# Patient Record
Sex: MALE | Race: WHITE | Employment: OTHER | URBAN - METROPOLITAN AREA
[De-identification: names, ages, dates, MRNs, and addresses within clinical notes are randomized per-mention and may not be internally consistent; named-entity substitution may affect disease eponyms.]

---

## 2021-01-01 ENCOUNTER — APPOINTMENT (OUTPATIENT)
Dept: PHYSICAL THERAPY | Facility: CLINIC | Age: 81
DRG: 470 | End: 2021-01-01
Attending: ORTHOPAEDIC SURGERY
Payer: MEDICARE

## 2021-01-01 ENCOUNTER — MYC MEDICAL ADVICE (OUTPATIENT)
Dept: FAMILY MEDICINE | Facility: CLINIC | Age: 81
End: 2021-01-01

## 2021-01-01 ENCOUNTER — APPOINTMENT (OUTPATIENT)
Dept: INTERPRETER SERVICES | Facility: CLINIC | Age: 81
DRG: 470 | End: 2021-01-01
Attending: ORTHOPAEDIC SURGERY
Payer: MEDICARE

## 2021-01-01 ENCOUNTER — TELEPHONE (OUTPATIENT)
Dept: FAMILY MEDICINE | Facility: CLINIC | Age: 81
End: 2021-01-01

## 2021-01-01 ENCOUNTER — APPOINTMENT (OUTPATIENT)
Dept: CT IMAGING | Facility: CLINIC | Age: 81
DRG: 177 | End: 2021-01-01
Attending: EMERGENCY MEDICINE
Payer: MEDICARE

## 2021-01-01 ENCOUNTER — LAB REQUISITION (OUTPATIENT)
Dept: LAB | Facility: CLINIC | Age: 81
End: 2021-01-01

## 2021-01-01 ENCOUNTER — OFFICE VISIT (OUTPATIENT)
Dept: ORTHOPEDICS | Facility: CLINIC | Age: 81
End: 2021-01-01
Attending: INTERNAL MEDICINE
Payer: MEDICARE

## 2021-01-01 ENCOUNTER — ANESTHESIA EVENT (OUTPATIENT)
Dept: SURGERY | Facility: CLINIC | Age: 81
DRG: 470 | End: 2021-01-01
Payer: MEDICARE

## 2021-01-01 ENCOUNTER — APPOINTMENT (OUTPATIENT)
Dept: PHYSICAL THERAPY | Facility: CLINIC | Age: 81
DRG: 871 | End: 2021-01-01
Attending: STUDENT IN AN ORGANIZED HEALTH CARE EDUCATION/TRAINING PROGRAM
Payer: MEDICARE

## 2021-01-01 ENCOUNTER — APPOINTMENT (OUTPATIENT)
Dept: ULTRASOUND IMAGING | Facility: CLINIC | Age: 81
DRG: 871 | End: 2021-01-01
Attending: STUDENT IN AN ORGANIZED HEALTH CARE EDUCATION/TRAINING PROGRAM
Payer: MEDICARE

## 2021-01-01 ENCOUNTER — OFFICE VISIT (OUTPATIENT)
Dept: RHEUMATOLOGY | Facility: CLINIC | Age: 81
End: 2021-01-01
Payer: MEDICARE

## 2021-01-01 ENCOUNTER — TELEPHONE (OUTPATIENT)
Dept: NEPHROLOGY | Facility: CLINIC | Age: 81
End: 2021-01-01

## 2021-01-01 ENCOUNTER — NURSE TRIAGE (OUTPATIENT)
Dept: FAMILY MEDICINE | Facility: CLINIC | Age: 81
End: 2021-01-01

## 2021-01-01 ENCOUNTER — APPOINTMENT (OUTPATIENT)
Dept: INTERPRETER SERVICES | Facility: CLINIC | Age: 81
End: 2021-01-01
Payer: MEDICARE

## 2021-01-01 ENCOUNTER — HOSPITAL ENCOUNTER (INPATIENT)
Facility: CLINIC | Age: 81
LOS: 9 days | DRG: 177 | End: 2021-12-17
Attending: EMERGENCY MEDICINE | Admitting: INTERNAL MEDICINE
Payer: MEDICARE

## 2021-01-01 ENCOUNTER — DOCUMENTATION ONLY (OUTPATIENT)
Dept: ORTHOPEDICS | Facility: CLINIC | Age: 81
End: 2021-01-01

## 2021-01-01 ENCOUNTER — TELEPHONE (OUTPATIENT)
Dept: ORTHOPEDICS | Facility: CLINIC | Age: 81
End: 2021-01-01

## 2021-01-01 ENCOUNTER — ANESTHESIA EVENT (OUTPATIENT)
Dept: MEDSURG UNIT | Facility: CLINIC | Age: 81
DRG: 177 | End: 2021-01-01
Payer: MEDICARE

## 2021-01-01 ENCOUNTER — OFFICE VISIT (OUTPATIENT)
Dept: FAMILY MEDICINE | Facility: CLINIC | Age: 81
End: 2021-01-01
Payer: MEDICARE

## 2021-01-01 ENCOUNTER — MEDICAL CORRESPONDENCE (OUTPATIENT)
Dept: HEALTH INFORMATION MANAGEMENT | Facility: CLINIC | Age: 81
End: 2021-01-01

## 2021-01-01 ENCOUNTER — ANESTHESIA (OUTPATIENT)
Dept: MEDSURG UNIT | Facility: CLINIC | Age: 81
DRG: 177 | End: 2021-01-01
Payer: MEDICARE

## 2021-01-01 ENCOUNTER — APPOINTMENT (OUTPATIENT)
Dept: OCCUPATIONAL THERAPY | Facility: CLINIC | Age: 81
DRG: 470 | End: 2021-01-01
Attending: PHYSICIAN ASSISTANT
Payer: MEDICARE

## 2021-01-01 ENCOUNTER — OFFICE VISIT (OUTPATIENT)
Dept: ORTHOPEDICS | Facility: CLINIC | Age: 81
End: 2021-01-01
Payer: MEDICARE

## 2021-01-01 ENCOUNTER — ANCILLARY PROCEDURE (OUTPATIENT)
Dept: GENERAL RADIOLOGY | Facility: CLINIC | Age: 81
End: 2021-01-01
Attending: FAMILY MEDICINE
Payer: MEDICARE

## 2021-01-01 ENCOUNTER — APPOINTMENT (OUTPATIENT)
Dept: CT IMAGING | Facility: CLINIC | Age: 81
DRG: 177 | End: 2021-01-01
Attending: INTERNAL MEDICINE
Payer: MEDICARE

## 2021-01-01 ENCOUNTER — APPOINTMENT (OUTPATIENT)
Dept: PHYSICAL THERAPY | Facility: CLINIC | Age: 81
DRG: 871 | End: 2021-01-01
Payer: MEDICARE

## 2021-01-01 ENCOUNTER — ANESTHESIA (OUTPATIENT)
Dept: SURGERY | Facility: CLINIC | Age: 81
DRG: 470 | End: 2021-01-01
Payer: MEDICARE

## 2021-01-01 ENCOUNTER — APPOINTMENT (OUTPATIENT)
Dept: GENERAL RADIOLOGY | Facility: CLINIC | Age: 81
DRG: 871 | End: 2021-01-01
Attending: EMERGENCY MEDICINE
Payer: MEDICARE

## 2021-01-01 ENCOUNTER — HOSPITAL ENCOUNTER (INPATIENT)
Facility: CLINIC | Age: 81
LOS: 3 days | Discharge: HOME | DRG: 470 | End: 2021-11-04
Attending: ORTHOPAEDIC SURGERY | Admitting: ORTHOPAEDIC SURGERY
Payer: MEDICARE

## 2021-01-01 ENCOUNTER — OFFICE VISIT (OUTPATIENT)
Dept: SURGERY | Facility: CLINIC | Age: 81
End: 2021-01-01
Payer: MEDICARE

## 2021-01-01 ENCOUNTER — LAB (OUTPATIENT)
Dept: LAB | Facility: CLINIC | Age: 81
End: 2021-01-01
Payer: MEDICARE

## 2021-01-01 ENCOUNTER — APPOINTMENT (OUTPATIENT)
Dept: GENERAL RADIOLOGY | Facility: CLINIC | Age: 81
DRG: 177 | End: 2021-01-01
Attending: EMERGENCY MEDICINE
Payer: MEDICARE

## 2021-01-01 ENCOUNTER — APPOINTMENT (OUTPATIENT)
Dept: GENERAL RADIOLOGY | Facility: CLINIC | Age: 81
DRG: 177 | End: 2021-01-01
Attending: INTERNAL MEDICINE
Payer: MEDICARE

## 2021-01-01 ENCOUNTER — OFFICE VISIT (OUTPATIENT)
Dept: ENDOCRINOLOGY | Facility: CLINIC | Age: 81
End: 2021-01-01
Attending: INTERNAL MEDICINE
Payer: MEDICARE

## 2021-01-01 ENCOUNTER — PATIENT OUTREACH (OUTPATIENT)
Dept: FAMILY MEDICINE | Facility: CLINIC | Age: 81
End: 2021-01-01
Payer: MEDICARE

## 2021-01-01 ENCOUNTER — TELEPHONE (OUTPATIENT)
Dept: FAMILY MEDICINE | Facility: CLINIC | Age: 81
End: 2021-01-01
Payer: MEDICARE

## 2021-01-01 ENCOUNTER — TELEPHONE (OUTPATIENT)
Dept: GERIATRICS | Facility: CLINIC | Age: 81
End: 2021-01-01
Payer: MEDICARE

## 2021-01-01 ENCOUNTER — ANCILLARY PROCEDURE (OUTPATIENT)
Dept: GENERAL RADIOLOGY | Facility: CLINIC | Age: 81
End: 2021-01-01
Attending: INTERNAL MEDICINE
Payer: MEDICARE

## 2021-01-01 ENCOUNTER — APPOINTMENT (OUTPATIENT)
Dept: GENERAL RADIOLOGY | Facility: CLINIC | Age: 81
DRG: 177 | End: 2021-01-01
Attending: NURSE PRACTITIONER
Payer: MEDICARE

## 2021-01-01 ENCOUNTER — PATIENT OUTREACH (OUTPATIENT)
Dept: CARE COORDINATION | Facility: CLINIC | Age: 81
End: 2021-01-01

## 2021-01-01 ENCOUNTER — APPOINTMENT (OUTPATIENT)
Dept: OCCUPATIONAL THERAPY | Facility: CLINIC | Age: 81
DRG: 871 | End: 2021-01-01
Attending: INTERNAL MEDICINE
Payer: MEDICARE

## 2021-01-01 ENCOUNTER — APPOINTMENT (OUTPATIENT)
Dept: OCCUPATIONAL THERAPY | Facility: CLINIC | Age: 81
DRG: 470 | End: 2021-01-01
Attending: ORTHOPAEDIC SURGERY
Payer: MEDICARE

## 2021-01-01 ENCOUNTER — HOSPITAL ENCOUNTER (INPATIENT)
Facility: CLINIC | Age: 81
LOS: 4 days | Discharge: HOME HEALTH | DRG: 871 | End: 2021-11-11
Attending: EMERGENCY MEDICINE | Admitting: STUDENT IN AN ORGANIZED HEALTH CARE EDUCATION/TRAINING PROGRAM
Payer: MEDICARE

## 2021-01-01 ENCOUNTER — OFFICE VISIT (OUTPATIENT)
Dept: ORTHOPEDICS | Facility: CLINIC | Age: 81
End: 2021-01-01
Attending: FAMILY MEDICINE
Payer: MEDICARE

## 2021-01-01 ENCOUNTER — PRE VISIT (OUTPATIENT)
Dept: SURGERY | Facility: CLINIC | Age: 81
End: 2021-01-01

## 2021-01-01 ENCOUNTER — TRANSITIONAL CARE UNIT VISIT (OUTPATIENT)
Dept: GERIATRICS | Facility: CLINIC | Age: 81
End: 2021-01-01
Payer: MEDICARE

## 2021-01-01 ENCOUNTER — LAB (OUTPATIENT)
Dept: URGENT CARE | Facility: URGENT CARE | Age: 81
End: 2021-01-01
Attending: ORTHOPAEDIC SURGERY
Payer: MEDICARE

## 2021-01-01 ENCOUNTER — OFFICE VISIT (OUTPATIENT)
Dept: RHEUMATOLOGY | Facility: CLINIC | Age: 81
End: 2021-01-01
Attending: INTERNAL MEDICINE
Payer: MEDICARE

## 2021-01-01 ENCOUNTER — TELEPHONE (OUTPATIENT)
Dept: NEPHROLOGY | Facility: CLINIC | Age: 81
End: 2021-01-01
Payer: MEDICARE

## 2021-01-01 ENCOUNTER — VIRTUAL VISIT (OUTPATIENT)
Dept: EDUCATION SERVICES | Facility: CLINIC | Age: 81
End: 2021-01-01
Attending: INTERNAL MEDICINE
Payer: MEDICARE

## 2021-01-01 ENCOUNTER — HOSPITAL ENCOUNTER (OUTPATIENT)
Dept: ULTRASOUND IMAGING | Facility: CLINIC | Age: 81
Discharge: HOME | End: 2021-08-17
Attending: INTERNAL MEDICINE | Admitting: INTERNAL MEDICINE
Payer: MEDICARE

## 2021-01-01 ENCOUNTER — APPOINTMENT (OUTPATIENT)
Dept: LAB | Facility: CLINIC | Age: 81
End: 2021-01-01
Payer: MEDICARE

## 2021-01-01 ENCOUNTER — APPOINTMENT (OUTPATIENT)
Dept: GENERAL RADIOLOGY | Facility: CLINIC | Age: 81
DRG: 470 | End: 2021-01-01
Attending: PHYSICIAN ASSISTANT
Payer: MEDICARE

## 2021-01-01 ENCOUNTER — HEALTH MAINTENANCE LETTER (OUTPATIENT)
Age: 81
End: 2021-01-01

## 2021-01-01 ENCOUNTER — PRE VISIT (OUTPATIENT)
Dept: NEPHROLOGY | Facility: CLINIC | Age: 81
End: 2021-01-01

## 2021-01-01 VITALS
SYSTOLIC BLOOD PRESSURE: 123 MMHG | WEIGHT: 170.5 LBS | BODY MASS INDEX: 25.84 KG/M2 | TEMPERATURE: 97 F | HEIGHT: 68 IN | HEART RATE: 92 BPM | OXYGEN SATURATION: 94 % | DIASTOLIC BLOOD PRESSURE: 59 MMHG

## 2021-01-01 VITALS — HEIGHT: 68 IN | WEIGHT: 160 LBS | BODY MASS INDEX: 24.25 KG/M2

## 2021-01-01 VITALS
HEART RATE: 96 BPM | BODY MASS INDEX: 24.74 KG/M2 | DIASTOLIC BLOOD PRESSURE: 67 MMHG | SYSTOLIC BLOOD PRESSURE: 136 MMHG | OXYGEN SATURATION: 95 % | WEIGHT: 163.2 LBS | HEIGHT: 68 IN

## 2021-01-01 VITALS
HEIGHT: 68 IN | TEMPERATURE: 97.2 F | HEART RATE: 98 BPM | DIASTOLIC BLOOD PRESSURE: 70 MMHG | RESPIRATION RATE: 17 BRPM | OXYGEN SATURATION: 90 % | WEIGHT: 163 LBS | SYSTOLIC BLOOD PRESSURE: 130 MMHG | BODY MASS INDEX: 24.71 KG/M2

## 2021-01-01 VITALS
SYSTOLIC BLOOD PRESSURE: 126 MMHG | HEART RATE: 79 BPM | OXYGEN SATURATION: 95 % | TEMPERATURE: 97.4 F | HEIGHT: 68 IN | RESPIRATION RATE: 16 BRPM | DIASTOLIC BLOOD PRESSURE: 72 MMHG | BODY MASS INDEX: 25.49 KG/M2 | WEIGHT: 168.2 LBS

## 2021-01-01 VITALS
SYSTOLIC BLOOD PRESSURE: 135 MMHG | OXYGEN SATURATION: 94 % | HEIGHT: 68 IN | HEART RATE: 84 BPM | BODY MASS INDEX: 25.46 KG/M2 | WEIGHT: 168 LBS | TEMPERATURE: 99 F | DIASTOLIC BLOOD PRESSURE: 71 MMHG

## 2021-01-01 VITALS
HEART RATE: 123 BPM | OXYGEN SATURATION: 91 % | DIASTOLIC BLOOD PRESSURE: 47 MMHG | RESPIRATION RATE: 28 BRPM | WEIGHT: 128.97 LBS | TEMPERATURE: 98.4 F | BODY MASS INDEX: 17.99 KG/M2 | SYSTOLIC BLOOD PRESSURE: 64 MMHG

## 2021-01-01 VITALS
TEMPERATURE: 97.6 F | DIASTOLIC BLOOD PRESSURE: 75 MMHG | SYSTOLIC BLOOD PRESSURE: 136 MMHG | BODY MASS INDEX: 24.63 KG/M2 | HEART RATE: 89 BPM | WEIGHT: 162 LBS | OXYGEN SATURATION: 93 %

## 2021-01-01 VITALS
BODY MASS INDEX: 24.02 KG/M2 | OXYGEN SATURATION: 94 % | WEIGHT: 175 LBS | DIASTOLIC BLOOD PRESSURE: 76 MMHG | HEART RATE: 80 BPM | TEMPERATURE: 96.6 F | WEIGHT: 158 LBS | SYSTOLIC BLOOD PRESSURE: 133 MMHG | RESPIRATION RATE: 20 BRPM | BODY MASS INDEX: 26.61 KG/M2 | HEART RATE: 86 BPM | TEMPERATURE: 97.6 F | SYSTOLIC BLOOD PRESSURE: 142 MMHG | RESPIRATION RATE: 20 BRPM | OXYGEN SATURATION: 95 % | DIASTOLIC BLOOD PRESSURE: 77 MMHG

## 2021-01-01 VITALS
SYSTOLIC BLOOD PRESSURE: 130 MMHG | TEMPERATURE: 97.9 F | HEART RATE: 89 BPM | RESPIRATION RATE: 20 BRPM | WEIGHT: 163 LBS | OXYGEN SATURATION: 94 % | HEIGHT: 68 IN | BODY MASS INDEX: 24.71 KG/M2 | DIASTOLIC BLOOD PRESSURE: 69 MMHG

## 2021-01-01 VITALS
HEART RATE: 65 BPM | TEMPERATURE: 98.4 F | OXYGEN SATURATION: 98 % | SYSTOLIC BLOOD PRESSURE: 130 MMHG | WEIGHT: 155.87 LBS | BODY MASS INDEX: 21.82 KG/M2 | RESPIRATION RATE: 15 BRPM | DIASTOLIC BLOOD PRESSURE: 59 MMHG | HEIGHT: 71 IN

## 2021-01-01 VITALS
TEMPERATURE: 99.2 F | SYSTOLIC BLOOD PRESSURE: 127 MMHG | HEART RATE: 100 BPM | HEIGHT: 68 IN | RESPIRATION RATE: 16 BRPM | OXYGEN SATURATION: 93 % | BODY MASS INDEX: 24.73 KG/M2 | DIASTOLIC BLOOD PRESSURE: 78 MMHG | WEIGHT: 163.14 LBS

## 2021-01-01 VITALS
HEART RATE: 80 BPM | TEMPERATURE: 97.1 F | OXYGEN SATURATION: 96 % | HEIGHT: 68 IN | BODY MASS INDEX: 25.91 KG/M2 | SYSTOLIC BLOOD PRESSURE: 133 MMHG | WEIGHT: 171 LBS | DIASTOLIC BLOOD PRESSURE: 70 MMHG

## 2021-01-01 VITALS
BODY MASS INDEX: 26.61 KG/M2 | DIASTOLIC BLOOD PRESSURE: 72 MMHG | HEIGHT: 68 IN | HEART RATE: 104 BPM | OXYGEN SATURATION: 95 % | SYSTOLIC BLOOD PRESSURE: 119 MMHG

## 2021-01-01 VITALS — BODY MASS INDEX: 26.55 KG/M2 | SYSTOLIC BLOOD PRESSURE: 100 MMHG | DIASTOLIC BLOOD PRESSURE: 56 MMHG | WEIGHT: 174.6 LBS

## 2021-01-01 DIAGNOSIS — M19.90 INFLAMMATORY ARTHRITIS: ICD-10-CM

## 2021-01-01 DIAGNOSIS — Z11.59 NEED FOR HEPATITIS C SCREENING TEST: ICD-10-CM

## 2021-01-01 DIAGNOSIS — R73.01 ELEVATED FASTING BLOOD SUGAR: ICD-10-CM

## 2021-01-01 DIAGNOSIS — E11.69 TYPE 2 DIABETES MELLITUS WITH OTHER SPECIFIED COMPLICATION, WITHOUT LONG-TERM CURRENT USE OF INSULIN (H): Primary | ICD-10-CM

## 2021-01-01 DIAGNOSIS — R79.89 ELEVATED SERUM CREATININE: ICD-10-CM

## 2021-01-01 DIAGNOSIS — R79.89 ELEVATED SERUM CREATININE: Primary | ICD-10-CM

## 2021-01-01 DIAGNOSIS — M10.9 GOUT, UNSPECIFIED CAUSE, UNSPECIFIED CHRONICITY, UNSPECIFIED SITE: ICD-10-CM

## 2021-01-01 DIAGNOSIS — M25.562 BILATERAL KNEE PAIN: ICD-10-CM

## 2021-01-01 DIAGNOSIS — M05.79 RHEUMATOID ARTHRITIS INVOLVING MULTIPLE SITES WITH POSITIVE RHEUMATOID FACTOR (H): ICD-10-CM

## 2021-01-01 DIAGNOSIS — R73.01 ELEVATED FASTING BLOOD SUGAR: Primary | ICD-10-CM

## 2021-01-01 DIAGNOSIS — Z51.81 ENCOUNTER FOR THERAPEUTIC DRUG MONITORING: ICD-10-CM

## 2021-01-01 DIAGNOSIS — M25.561 BILATERAL KNEE PAIN: ICD-10-CM

## 2021-01-01 DIAGNOSIS — K59.01 SLOW TRANSIT CONSTIPATION: ICD-10-CM

## 2021-01-01 DIAGNOSIS — D72.829 LEUKOCYTOSIS, UNSPECIFIED TYPE: ICD-10-CM

## 2021-01-01 DIAGNOSIS — J18.9 COMMUNITY ACQUIRED PNEUMONIA, UNSPECIFIED LATERALITY: ICD-10-CM

## 2021-01-01 DIAGNOSIS — Z71.89 OTHER SPECIFIED COUNSELING: ICD-10-CM

## 2021-01-01 DIAGNOSIS — R03.0 ELEVATED BP WITHOUT DIAGNOSIS OF HYPERTENSION: ICD-10-CM

## 2021-01-01 DIAGNOSIS — M05.742 RHEUMATOID ARTHRITIS INVOLVING BOTH HANDS WITH POSITIVE RHEUMATOID FACTOR (H): Primary | ICD-10-CM

## 2021-01-01 DIAGNOSIS — M17.12 LOCALIZED OSTEOARTHRITIS OF LEFT KNEE: Primary | ICD-10-CM

## 2021-01-01 DIAGNOSIS — M1A.0690 CHRONIC GOUT OF KNEE, UNSPECIFIED CAUSE, UNSPECIFIED LATERALITY: ICD-10-CM

## 2021-01-01 DIAGNOSIS — M1A.0690 CHRONIC GOUT OF KNEE, UNSPECIFIED CAUSE, UNSPECIFIED LATERALITY: Primary | ICD-10-CM

## 2021-01-01 DIAGNOSIS — G89.29 CHRONIC PAIN OF BOTH KNEES: ICD-10-CM

## 2021-01-01 DIAGNOSIS — M25.561 CHRONIC PAIN OF BOTH KNEES: ICD-10-CM

## 2021-01-01 DIAGNOSIS — M05.742 RHEUMATOID ARTHRITIS INVOLVING BOTH HANDS WITH POSITIVE RHEUMATOID FACTOR (H): ICD-10-CM

## 2021-01-01 DIAGNOSIS — M17.0 BILATERAL PRIMARY OSTEOARTHRITIS OF KNEE: ICD-10-CM

## 2021-01-01 DIAGNOSIS — R53.81 PHYSICAL DECONDITIONING: ICD-10-CM

## 2021-01-01 DIAGNOSIS — Z00.00 ANNUAL PHYSICAL EXAM: Primary | ICD-10-CM

## 2021-01-01 DIAGNOSIS — E55.9 VITAMIN D DEFICIENCY, UNSPECIFIED: ICD-10-CM

## 2021-01-01 DIAGNOSIS — Z11.59 ENCOUNTER FOR SCREENING FOR OTHER VIRAL DISEASES: ICD-10-CM

## 2021-01-01 DIAGNOSIS — M17.10 PRIMARY OSTEOARTHRITIS OF ONE KNEE: ICD-10-CM

## 2021-01-01 DIAGNOSIS — E11.65 UNCONTROLLED TYPE 2 DIABETES MELLITUS WITH HYPERGLYCEMIA (H): Primary | ICD-10-CM

## 2021-01-01 DIAGNOSIS — M05.741 RHEUMATOID ARTHRITIS INVOLVING BOTH HANDS WITH POSITIVE RHEUMATOID FACTOR (H): Primary | ICD-10-CM

## 2021-01-01 DIAGNOSIS — D62 ACUTE BLOOD LOSS ANEMIA: ICD-10-CM

## 2021-01-01 DIAGNOSIS — N18.2 TYPE 2 DIABETES MELLITUS WITH STAGE 2 CHRONIC KIDNEY DISEASE, WITHOUT LONG-TERM CURRENT USE OF INSULIN (H): ICD-10-CM

## 2021-01-01 DIAGNOSIS — Z79.631 LONG TERM METHOTREXATE USER: Primary | ICD-10-CM

## 2021-01-01 DIAGNOSIS — Z23 INFLUENZA VACCINE ADMINISTERED: ICD-10-CM

## 2021-01-01 DIAGNOSIS — M06.9 RHEUMATOID ARTHRITIS, INVOLVING UNSPECIFIED SITE, UNSPECIFIED WHETHER RHEUMATOID FACTOR PRESENT (H): ICD-10-CM

## 2021-01-01 DIAGNOSIS — K59.00 CONSTIPATION, UNSPECIFIED CONSTIPATION TYPE: Primary | ICD-10-CM

## 2021-01-01 DIAGNOSIS — Z01.818 PREOPERATIVE EXAMINATION: Primary | ICD-10-CM

## 2021-01-01 DIAGNOSIS — Z01.818 PREOP EXAMINATION: Primary | ICD-10-CM

## 2021-01-01 DIAGNOSIS — Z00.01 ENCOUNTER FOR GENERAL ADULT MEDICAL EXAMINATION WITH ABNORMAL FINDINGS: ICD-10-CM

## 2021-01-01 DIAGNOSIS — E78.5 HYPERLIPIDEMIA LDL GOAL <130: ICD-10-CM

## 2021-01-01 DIAGNOSIS — R09.02 HYPOXIA: ICD-10-CM

## 2021-01-01 DIAGNOSIS — M10.042 ACUTE IDIOPATHIC GOUT OF LEFT HAND: Primary | ICD-10-CM

## 2021-01-01 DIAGNOSIS — Z96.652 STATUS POST TOTAL LEFT KNEE REPLACEMENT: Primary | ICD-10-CM

## 2021-01-01 DIAGNOSIS — Z79.631 LONG TERM METHOTREXATE USER: ICD-10-CM

## 2021-01-01 DIAGNOSIS — U07.1 INFECTION DUE TO 2019 NOVEL CORONAVIRUS: ICD-10-CM

## 2021-01-01 DIAGNOSIS — Z96.652 HISTORY OF TOTAL KNEE ARTHROPLASTY, LEFT: Primary | ICD-10-CM

## 2021-01-01 DIAGNOSIS — J96.01 ACUTE RESPIRATORY FAILURE WITH HYPOXIA (H): ICD-10-CM

## 2021-01-01 DIAGNOSIS — E11.69 TYPE 2 DIABETES MELLITUS WITH OTHER SPECIFIED COMPLICATION, WITHOUT LONG-TERM CURRENT USE OF INSULIN (H): ICD-10-CM

## 2021-01-01 DIAGNOSIS — M17.12 OSTEOARTHRITIS OF LEFT KNEE, UNSPECIFIED OSTEOARTHRITIS TYPE: ICD-10-CM

## 2021-01-01 DIAGNOSIS — Z01.818 PREOP EXAMINATION: ICD-10-CM

## 2021-01-01 DIAGNOSIS — M19.90 INFLAMMATORY ARTHRITIS: Primary | ICD-10-CM

## 2021-01-01 DIAGNOSIS — E11.65 UNCONTROLLED TYPE 2 DIABETES MELLITUS WITH HYPERGLYCEMIA (H): ICD-10-CM

## 2021-01-01 DIAGNOSIS — M05.741 RHEUMATOID ARTHRITIS INVOLVING BOTH HANDS WITH POSITIVE RHEUMATOID FACTOR (H): ICD-10-CM

## 2021-01-01 DIAGNOSIS — M25.562 CHRONIC PAIN OF BOTH KNEES: ICD-10-CM

## 2021-01-01 DIAGNOSIS — M10.9 ACUTE GOUTY ARTHRITIS: ICD-10-CM

## 2021-01-01 DIAGNOSIS — M17.0 BILATERAL PRIMARY OSTEOARTHRITIS OF KNEE: Primary | ICD-10-CM

## 2021-01-01 DIAGNOSIS — I87.2 CHRONIC VENOUS INSUFFICIENCY: ICD-10-CM

## 2021-01-01 DIAGNOSIS — E78.5 DYSLIPIDEMIA: ICD-10-CM

## 2021-01-01 DIAGNOSIS — E11.22 TYPE 2 DIABETES MELLITUS WITH STAGE 2 CHRONIC KIDNEY DISEASE, WITHOUT LONG-TERM CURRENT USE OF INSULIN (H): ICD-10-CM

## 2021-01-01 LAB
ABO/RH(D): NORMAL
ABO/RH(D): NORMAL
ALBUMIN SERPL-MCNC: 2.3 G/DL (ref 3.4–5)
ALBUMIN SERPL-MCNC: 2.4 G/DL (ref 3.4–5)
ALBUMIN SERPL-MCNC: 2.4 G/DL (ref 3.4–5)
ALBUMIN SERPL-MCNC: 3 G/DL (ref 3.4–5)
ALBUMIN SERPL-MCNC: 3.1 G/DL (ref 3.4–5)
ALBUMIN SERPL-MCNC: 3.2 G/DL (ref 3.4–5)
ALBUMIN SERPL-MCNC: 3.5 G/DL (ref 3.4–5)
ALBUMIN SERPL-MCNC: 3.7 G/DL (ref 3.4–5)
ALBUMIN SERPL-MCNC: 3.8 G/DL (ref 3.4–5)
ALBUMIN SERPL-MCNC: 3.8 G/DL (ref 3.4–5)
ALBUMIN UR-MCNC: 20 MG/DL
ALBUMIN UR-MCNC: NEGATIVE MG/DL
ALBUMIN UR-MCNC: NEGATIVE MG/DL
ALP SERPL-CCNC: 102 U/L (ref 40–150)
ALP SERPL-CCNC: 137 U/L (ref 40–150)
ALP SERPL-CCNC: 51 U/L (ref 40–150)
ALP SERPL-CCNC: 54 U/L (ref 40–150)
ALT SERPL W P-5'-P-CCNC: 15 U/L (ref 0–70)
ALT SERPL W P-5'-P-CCNC: 18 U/L (ref 0–70)
ALT SERPL W P-5'-P-CCNC: 20 U/L (ref 0–70)
ALT SERPL W P-5'-P-CCNC: 22 U/L (ref 0–70)
ALT SERPL W P-5'-P-CCNC: 26 U/L (ref 0–70)
ALT SERPL W P-5'-P-CCNC: 33 U/L (ref 0–70)
ALT SERPL W P-5'-P-CCNC: 38 U/L (ref 0–70)
AMMONIA PLAS-SCNC: <10 UMOL/L (ref 10–50)
AMORPH CRY #/AREA URNS HPF: ABNORMAL /HPF
ANION GAP SERPL CALCULATED.3IONS-SCNC: 3 MMOL/L (ref 3–14)
ANION GAP SERPL CALCULATED.3IONS-SCNC: 3 MMOL/L (ref 3–14)
ANION GAP SERPL CALCULATED.3IONS-SCNC: 5 MMOL/L (ref 3–14)
ANION GAP SERPL CALCULATED.3IONS-SCNC: 6 MMOL/L (ref 3–14)
ANION GAP SERPL CALCULATED.3IONS-SCNC: 7 MMOL/L (ref 3–14)
ANION GAP SERPL CALCULATED.3IONS-SCNC: 8 MMOL/L (ref 3–14)
ANION GAP SERPL CALCULATED.3IONS-SCNC: 9 MMOL/L (ref 3–14)
ANTIBODY SCREEN: NEGATIVE
ANTIBODY SCREEN: NEGATIVE
APPEARANCE UR: CLEAR
AST SERPL W P-5'-P-CCNC: 13 U/L (ref 0–45)
AST SERPL W P-5'-P-CCNC: 15 U/L (ref 0–45)
AST SERPL W P-5'-P-CCNC: 21 U/L (ref 0–45)
AST SERPL W P-5'-P-CCNC: 30 U/L (ref 0–45)
AST SERPL W P-5'-P-CCNC: 36 U/L (ref 0–45)
AST SERPL W P-5'-P-CCNC: 9 U/L (ref 0–45)
AST SERPL W P-5'-P-CCNC: 9 U/L (ref 0–45)
ATRIAL RATE - MUSE: 121 BPM
ATRIAL RATE - MUSE: 90 BPM
BACTERIA BLD CULT: NO GROWTH
BACTERIA BLD CULT: NO GROWTH
BASE EXCESS BLDA CALC-SCNC: 0.6 MMOL/L (ref -9–1.8)
BASE EXCESS BLDA CALC-SCNC: 1.2 MMOL/L (ref -9–1.8)
BASE EXCESS BLDV CALC-SCNC: -0.9 MMOL/L (ref -7.7–1.9)
BASE EXCESS BLDV CALC-SCNC: -5.9 MMOL/L (ref -7.7–1.9)
BASE EXCESS BLDV CALC-SCNC: 4.3 MMOL/L (ref -7.7–1.9)
BASOPHILS # BLD AUTO: 0 10E3/UL (ref 0–0.2)
BASOPHILS # BLD MANUAL: 0 10E3/UL (ref 0–0.2)
BASOPHILS NFR BLD AUTO: 0 %
BASOPHILS NFR BLD MANUAL: 0 %
BILIRUB SERPL-MCNC: 0.3 MG/DL (ref 0.2–1.3)
BILIRUB SERPL-MCNC: 0.4 MG/DL (ref 0.2–1.3)
BILIRUB SERPL-MCNC: 0.5 MG/DL (ref 0.2–1.3)
BILIRUB SERPL-MCNC: 0.8 MG/DL (ref 0.2–1.3)
BILIRUB UR QL STRIP: NEGATIVE
BUN SERPL-MCNC: 14 MG/DL (ref 7–30)
BUN SERPL-MCNC: 17 MG/DL (ref 7–30)
BUN SERPL-MCNC: 18 MG/DL (ref 7–30)
BUN SERPL-MCNC: 18 MG/DL (ref 7–30)
BUN SERPL-MCNC: 19 MG/DL (ref 7–30)
BUN SERPL-MCNC: 19 MG/DL (ref 7–30)
BUN SERPL-MCNC: 24 MG/DL (ref 7–30)
BUN SERPL-MCNC: 31 MG/DL (ref 7–30)
BUN SERPL-MCNC: 32 MG/DL (ref 7–30)
BUN SERPL-MCNC: 33 MG/DL (ref 7–30)
BUN SERPL-MCNC: 35 MG/DL (ref 7–30)
BUN SERPL-MCNC: 36 MG/DL (ref 7–30)
BUN SERPL-MCNC: 37 MG/DL (ref 7–30)
BUN SERPL-MCNC: 38 MG/DL (ref 7–30)
BUN SERPL-MCNC: 40 MG/DL (ref 7–30)
BUN SERPL-MCNC: 40 MG/DL (ref 7–30)
BUN SERPL-MCNC: 41 MG/DL (ref 7–30)
BUN SERPL-MCNC: 42 MG/DL (ref 7–30)
BUN SERPL-MCNC: 42 MG/DL (ref 7–30)
BUN SERPL-MCNC: 51 MG/DL (ref 7–30)
CALCIUM SERPL-MCNC: 10 MG/DL (ref 8.5–10.1)
CALCIUM SERPL-MCNC: 10 MG/DL (ref 8.5–10.1)
CALCIUM SERPL-MCNC: 10.1 MG/DL (ref 8.5–10.1)
CALCIUM SERPL-MCNC: 10.2 MG/DL (ref 8.5–10.1)
CALCIUM SERPL-MCNC: 10.2 MG/DL (ref 8.5–10.1)
CALCIUM SERPL-MCNC: 10.3 MG/DL (ref 8.5–10.1)
CALCIUM SERPL-MCNC: 10.4 MG/DL (ref 8.5–10.1)
CALCIUM SERPL-MCNC: 10.6 MG/DL (ref 8.5–10.1)
CALCIUM SERPL-MCNC: 8.4 MG/DL (ref 8.5–10.1)
CALCIUM SERPL-MCNC: 8.6 MG/DL (ref 8.5–10.1)
CALCIUM SERPL-MCNC: 8.9 MG/DL (ref 8.5–10.1)
CALCIUM SERPL-MCNC: 8.9 MG/DL (ref 8.5–10.1)
CALCIUM SERPL-MCNC: 9.3 MG/DL (ref 8.5–10.1)
CALCIUM SERPL-MCNC: 9.4 MG/DL (ref 8.5–10.1)
CALCIUM SERPL-MCNC: 9.5 MG/DL (ref 8.5–10.1)
CALCIUM SERPL-MCNC: 9.6 MG/DL (ref 8.5–10.1)
CALCIUM SERPL-MCNC: 9.6 MG/DL (ref 8.5–10.1)
CALCIUM SERPL-MCNC: 9.7 MG/DL (ref 8.5–10.1)
CALCIUM SERPL-MCNC: 9.8 MG/DL (ref 8.5–10.1)
CALCIUM SERPL-MCNC: 9.8 MG/DL (ref 8.5–10.1)
CCP AB SER IA-ACNC: 1.3 U/ML
CHLORIDE BLD-SCNC: 100 MMOL/L (ref 94–109)
CHLORIDE BLD-SCNC: 101 MMOL/L (ref 94–109)
CHLORIDE BLD-SCNC: 102 MMOL/L (ref 94–109)
CHLORIDE BLD-SCNC: 102 MMOL/L (ref 94–109)
CHLORIDE BLD-SCNC: 103 MMOL/L (ref 94–109)
CHLORIDE BLD-SCNC: 104 MMOL/L (ref 94–109)
CHLORIDE BLD-SCNC: 105 MMOL/L (ref 94–109)
CHLORIDE BLD-SCNC: 105 MMOL/L (ref 94–109)
CHLORIDE BLD-SCNC: 106 MMOL/L (ref 94–109)
CHLORIDE BLD-SCNC: 107 MMOL/L (ref 94–109)
CHLORIDE BLD-SCNC: 110 MMOL/L (ref 94–109)
CHLORIDE BLD-SCNC: 111 MMOL/L (ref 94–109)
CHLORIDE BLD-SCNC: 112 MMOL/L (ref 94–109)
CHLORIDE BLD-SCNC: 112 MMOL/L (ref 94–109)
CHLORIDE BLD-SCNC: 117 MMOL/L (ref 94–109)
CHLORIDE BLD-SCNC: 119 MMOL/L (ref 94–109)
CHLORIDE BLD-SCNC: 95 MMOL/L (ref 94–109)
CHLORIDE BLD-SCNC: 99 MMOL/L (ref 94–109)
CHOLEST SERPL-MCNC: 172 MG/DL
CK SERPL-CCNC: 106 U/L (ref 30–300)
CK SERPL-CCNC: 17 U/L (ref 30–300)
CK SERPL-CCNC: 37 U/L (ref 30–300)
CO2 SERPL-SCNC: 17 MMOL/L (ref 20–32)
CO2 SERPL-SCNC: 22 MMOL/L (ref 20–32)
CO2 SERPL-SCNC: 23 MMOL/L (ref 20–32)
CO2 SERPL-SCNC: 24 MMOL/L (ref 20–32)
CO2 SERPL-SCNC: 25 MMOL/L (ref 20–32)
CO2 SERPL-SCNC: 26 MMOL/L (ref 20–32)
CO2 SERPL-SCNC: 27 MMOL/L (ref 20–32)
CO2 SERPL-SCNC: 28 MMOL/L (ref 20–32)
CO2 SERPL-SCNC: 30 MMOL/L (ref 20–32)
COLOR UR AUTO: ABNORMAL
COLOR UR AUTO: ABNORMAL
COLOR UR AUTO: YELLOW
CREAT SERPL-MCNC: 0.67 MG/DL (ref 0.66–1.25)
CREAT SERPL-MCNC: 0.72 MG/DL (ref 0.66–1.25)
CREAT SERPL-MCNC: 0.79 MG/DL (ref 0.66–1.25)
CREAT SERPL-MCNC: 0.79 MG/DL (ref 0.66–1.25)
CREAT SERPL-MCNC: 0.81 MG/DL (ref 0.66–1.25)
CREAT SERPL-MCNC: 0.82 MG/DL (ref 0.66–1.25)
CREAT SERPL-MCNC: 0.83 MG/DL (ref 0.66–1.25)
CREAT SERPL-MCNC: 0.84 MG/DL (ref 0.66–1.25)
CREAT SERPL-MCNC: 0.85 MG/DL (ref 0.66–1.25)
CREAT SERPL-MCNC: 0.85 MG/DL (ref 0.66–1.25)
CREAT SERPL-MCNC: 0.87 MG/DL (ref 0.66–1.25)
CREAT SERPL-MCNC: 0.89 MG/DL (ref 0.66–1.25)
CREAT SERPL-MCNC: 0.9 MG/DL (ref 0.66–1.25)
CREAT SERPL-MCNC: 0.9 MG/DL (ref 0.66–1.25)
CREAT SERPL-MCNC: 0.94 MG/DL (ref 0.66–1.25)
CREAT SERPL-MCNC: 0.99 MG/DL (ref 0.66–1.25)
CREAT SERPL-MCNC: 1.03 MG/DL (ref 0.66–1.25)
CREAT SERPL-MCNC: 1.08 MG/DL (ref 0.66–1.25)
CREAT SERPL-MCNC: 1.17 MG/DL (ref 0.66–1.25)
CREAT SERPL-MCNC: 1.17 MG/DL (ref 0.66–1.25)
CREAT SERPL-MCNC: 1.23 MG/DL (ref 0.66–1.25)
CREAT SERPL-MCNC: 1.55 MG/DL (ref 0.66–1.25)
CREAT SERPL-MCNC: 2.29 MG/DL (ref 0.66–1.25)
CREAT UR-MCNC: 127 MG/DL
CREAT UR-MCNC: 44 MG/DL
CRP SERPL-MCNC: 100 MG/L (ref 0–8)
CRP SERPL-MCNC: 101 MG/L (ref 0–8)
CRP SERPL-MCNC: 11 MG/L (ref 0–8)
CRP SERPL-MCNC: 115 MG/L (ref 0–8)
CRP SERPL-MCNC: 150 MG/L (ref 0–8)
CRP SERPL-MCNC: 156 MG/L (ref 0–8)
CRP SERPL-MCNC: 177 MG/L (ref 0–8)
CRP SERPL-MCNC: 218 MG/L (ref 0–8)
CRP SERPL-MCNC: 257 MG/L (ref 0–8)
CRP SERPL-MCNC: 277 MG/L (ref 0–8)
CRP SERPL-MCNC: 344 MG/L (ref 0–8)
CRP SERPL-MCNC: 372 MG/L (ref 0–8)
CRP SERPL-MCNC: 83.7 MG/L (ref 0–8)
D DIMER PPP FEU-MCNC: 0.72 UG/ML FEU (ref 0–0.5)
D DIMER PPP FEU-MCNC: 0.78 UG/ML FEU (ref 0–0.5)
D DIMER PPP FEU-MCNC: 0.91 UG/ML FEU (ref 0–0.5)
D DIMER PPP FEU-MCNC: 1.41 UG/ML FEU (ref 0–0.5)
D DIMER PPP FEU-MCNC: 1.45 UG/ML FEU (ref 0–0.5)
D DIMER PPP FEU-MCNC: 1.76 UG/ML FEU (ref 0–0.5)
DEPRECATED CALCIDIOL+CALCIFEROL SERPL-MC: 39 UG/L (ref 20–75)
DIASTOLIC BLOOD PRESSURE - MUSE: NORMAL MMHG
DIASTOLIC BLOOD PRESSURE - MUSE: NORMAL MMHG
EOSINOPHIL # BLD AUTO: 0 10E3/UL (ref 0–0.7)
EOSINOPHIL # BLD AUTO: 0.1 10E3/UL (ref 0–0.7)
EOSINOPHIL # BLD MANUAL: 0 10E3/UL (ref 0–0.7)
EOSINOPHIL NFR BLD AUTO: 0 %
EOSINOPHIL NFR BLD MANUAL: 0 %
EOSINOPHIL NFR BLD MANUAL: 1 %
ERYTHROCYTE [DISTWIDTH] IN BLOOD BY AUTOMATED COUNT: 13.1 % (ref 10–15)
ERYTHROCYTE [DISTWIDTH] IN BLOOD BY AUTOMATED COUNT: 13.4 % (ref 10–15)
ERYTHROCYTE [DISTWIDTH] IN BLOOD BY AUTOMATED COUNT: 13.5 % (ref 10–15)
ERYTHROCYTE [DISTWIDTH] IN BLOOD BY AUTOMATED COUNT: 13.6 % (ref 10–15)
ERYTHROCYTE [DISTWIDTH] IN BLOOD BY AUTOMATED COUNT: 14 % (ref 10–15)
ERYTHROCYTE [DISTWIDTH] IN BLOOD BY AUTOMATED COUNT: 14.3 % (ref 10–15)
ERYTHROCYTE [DISTWIDTH] IN BLOOD BY AUTOMATED COUNT: 14.3 % (ref 10–15)
ERYTHROCYTE [DISTWIDTH] IN BLOOD BY AUTOMATED COUNT: 14.5 % (ref 10–15)
ERYTHROCYTE [DISTWIDTH] IN BLOOD BY AUTOMATED COUNT: 14.6 % (ref 10–15)
ERYTHROCYTE [DISTWIDTH] IN BLOOD BY AUTOMATED COUNT: 14.8 % (ref 10–15)
ERYTHROCYTE [DISTWIDTH] IN BLOOD BY AUTOMATED COUNT: 14.8 % (ref 10–15)
ERYTHROCYTE [DISTWIDTH] IN BLOOD BY AUTOMATED COUNT: 14.9 % (ref 10–15)
ERYTHROCYTE [DISTWIDTH] IN BLOOD BY AUTOMATED COUNT: 15 % (ref 10–15)
ERYTHROCYTE [SEDIMENTATION RATE] IN BLOOD BY WESTERGREN METHOD: 112 MM/HR (ref 0–20)
ERYTHROCYTE [SEDIMENTATION RATE] IN BLOOD BY WESTERGREN METHOD: 42 MM/HR (ref 0–20)
ERYTHROCYTE [SEDIMENTATION RATE] IN BLOOD BY WESTERGREN METHOD: 73 MM/HR (ref 0–20)
ERYTHROCYTE [SEDIMENTATION RATE] IN BLOOD BY WESTERGREN METHOD: 82 MM/HR (ref 0–20)
ERYTHROCYTE [SEDIMENTATION RATE] IN BLOOD BY WESTERGREN METHOD: 87 MM/HR (ref 0–20)
FASTING STATUS PATIENT QL REPORTED: YES
FERRITIN SERPL-MCNC: 130 NG/ML (ref 26–388)
FERRITIN SERPL-MCNC: 1389 NG/ML (ref 26–388)
FERRITIN SERPL-MCNC: 709 NG/ML (ref 26–388)
FERRITIN SERPL-MCNC: 803 NG/ML (ref 26–388)
FIBRINOGEN PPP-MCNC: 658 MG/DL (ref 170–490)
FIBRINOGEN PPP-MCNC: 752 MG/DL (ref 170–490)
FIBRINOGEN PPP-MCNC: 816 MG/DL (ref 170–490)
FIBRINOGEN PPP-MCNC: 915 MG/DL (ref 170–490)
FLUAV RNA SPEC QL NAA+PROBE: NEGATIVE
FLUBV RNA RESP QL NAA+PROBE: NEGATIVE
GAMMA INTERFERON BACKGROUND BLD IA-ACNC: 0.06 IU/ML
GFR SERPL CREATININE-BSD FRML MDRD: 26 ML/MIN/1.73M2
GFR SERPL CREATININE-BSD FRML MDRD: 42 ML/MIN/1.73M2
GFR SERPL CREATININE-BSD FRML MDRD: 55 ML/MIN/1.73M2
GFR SERPL CREATININE-BSD FRML MDRD: 59 ML/MIN/1.73M2
GFR SERPL CREATININE-BSD FRML MDRD: 59 ML/MIN/1.73M2
GFR SERPL CREATININE-BSD FRML MDRD: 65 ML/MIN/1.73M2
GFR SERPL CREATININE-BSD FRML MDRD: 69 ML/MIN/1.73M2
GFR SERPL CREATININE-BSD FRML MDRD: 72 ML/MIN/1.73M2
GFR SERPL CREATININE-BSD FRML MDRD: 77 ML/MIN/1.73M2
GFR SERPL CREATININE-BSD FRML MDRD: 81 ML/MIN/1.73M2
GFR SERPL CREATININE-BSD FRML MDRD: 81 ML/MIN/1.73M2
GFR SERPL CREATININE-BSD FRML MDRD: 82 ML/MIN/1.73M2
GFR SERPL CREATININE-BSD FRML MDRD: 82 ML/MIN/1.73M2
GFR SERPL CREATININE-BSD FRML MDRD: 83 ML/MIN/1.73M2
GFR SERPL CREATININE-BSD FRML MDRD: 84 ML/MIN/1.73M2
GFR SERPL CREATININE-BSD FRML MDRD: 85 ML/MIN/1.73M2
GFR SERPL CREATININE-BSD FRML MDRD: 89 ML/MIN/1.73M2
GFR SERPL CREATININE-BSD FRML MDRD: >90 ML/MIN/1.73M2
GLUCOSE BLD-MCNC: 110 MG/DL (ref 70–99)
GLUCOSE BLD-MCNC: 120 MG/DL (ref 70–99)
GLUCOSE BLD-MCNC: 131 MG/DL (ref 70–99)
GLUCOSE BLD-MCNC: 137 MG/DL (ref 70–99)
GLUCOSE BLD-MCNC: 146 MG/DL (ref 70–99)
GLUCOSE BLD-MCNC: 152 MG/DL (ref 70–99)
GLUCOSE BLD-MCNC: 162 MG/DL (ref 70–99)
GLUCOSE BLD-MCNC: 164 MG/DL (ref 70–99)
GLUCOSE BLD-MCNC: 165 MG/DL (ref 70–99)
GLUCOSE BLD-MCNC: 167 MG/DL (ref 70–99)
GLUCOSE BLD-MCNC: 184 MG/DL (ref 70–99)
GLUCOSE BLD-MCNC: 190 MG/DL (ref 70–99)
GLUCOSE BLD-MCNC: 194 MG/DL (ref 70–99)
GLUCOSE BLD-MCNC: 215 MG/DL (ref 70–99)
GLUCOSE BLD-MCNC: 224 MG/DL (ref 70–99)
GLUCOSE BLD-MCNC: 224 MG/DL (ref 70–99)
GLUCOSE BLD-MCNC: 226 MG/DL (ref 70–99)
GLUCOSE BLD-MCNC: 281 MG/DL (ref 70–99)
GLUCOSE BLD-MCNC: 57 MG/DL (ref 70–99)
GLUCOSE BLD-MCNC: 86 MG/DL (ref 70–99)
GLUCOSE BLD-MCNC: 92 MG/DL (ref 70–99)
GLUCOSE BLDC GLUCOMTR-MCNC: 113 MG/DL (ref 70–99)
GLUCOSE BLDC GLUCOMTR-MCNC: 122 MG/DL (ref 70–99)
GLUCOSE BLDC GLUCOMTR-MCNC: 126 MG/DL (ref 70–99)
GLUCOSE BLDC GLUCOMTR-MCNC: 135 MG/DL (ref 70–99)
GLUCOSE BLDC GLUCOMTR-MCNC: 137 MG/DL (ref 70–99)
GLUCOSE BLDC GLUCOMTR-MCNC: 140 MG/DL (ref 70–99)
GLUCOSE BLDC GLUCOMTR-MCNC: 142 MG/DL (ref 70–99)
GLUCOSE BLDC GLUCOMTR-MCNC: 146 MG/DL (ref 70–99)
GLUCOSE BLDC GLUCOMTR-MCNC: 148 MG/DL (ref 70–99)
GLUCOSE BLDC GLUCOMTR-MCNC: 148 MG/DL (ref 70–99)
GLUCOSE BLDC GLUCOMTR-MCNC: 153 MG/DL (ref 70–99)
GLUCOSE BLDC GLUCOMTR-MCNC: 154 MG/DL (ref 70–99)
GLUCOSE BLDC GLUCOMTR-MCNC: 155 MG/DL (ref 70–99)
GLUCOSE BLDC GLUCOMTR-MCNC: 156 MG/DL (ref 70–99)
GLUCOSE BLDC GLUCOMTR-MCNC: 159 MG/DL (ref 70–99)
GLUCOSE BLDC GLUCOMTR-MCNC: 161 MG/DL (ref 70–99)
GLUCOSE BLDC GLUCOMTR-MCNC: 162 MG/DL (ref 70–99)
GLUCOSE BLDC GLUCOMTR-MCNC: 163 MG/DL (ref 70–99)
GLUCOSE BLDC GLUCOMTR-MCNC: 165 MG/DL (ref 70–99)
GLUCOSE BLDC GLUCOMTR-MCNC: 165 MG/DL (ref 70–99)
GLUCOSE BLDC GLUCOMTR-MCNC: 168 MG/DL (ref 70–99)
GLUCOSE BLDC GLUCOMTR-MCNC: 170 MG/DL (ref 70–99)
GLUCOSE BLDC GLUCOMTR-MCNC: 171 MG/DL (ref 70–99)
GLUCOSE BLDC GLUCOMTR-MCNC: 173 MG/DL (ref 70–99)
GLUCOSE BLDC GLUCOMTR-MCNC: 174 MG/DL (ref 70–99)
GLUCOSE BLDC GLUCOMTR-MCNC: 179 MG/DL (ref 70–99)
GLUCOSE BLDC GLUCOMTR-MCNC: 179 MG/DL (ref 70–99)
GLUCOSE BLDC GLUCOMTR-MCNC: 181 MG/DL (ref 70–99)
GLUCOSE BLDC GLUCOMTR-MCNC: 182 MG/DL (ref 70–99)
GLUCOSE BLDC GLUCOMTR-MCNC: 187 MG/DL (ref 70–99)
GLUCOSE BLDC GLUCOMTR-MCNC: 188 MG/DL (ref 70–99)
GLUCOSE BLDC GLUCOMTR-MCNC: 189 MG/DL (ref 70–99)
GLUCOSE BLDC GLUCOMTR-MCNC: 190 MG/DL (ref 70–99)
GLUCOSE BLDC GLUCOMTR-MCNC: 190 MG/DL (ref 70–99)
GLUCOSE BLDC GLUCOMTR-MCNC: 193 MG/DL (ref 70–99)
GLUCOSE BLDC GLUCOMTR-MCNC: 195 MG/DL (ref 70–99)
GLUCOSE BLDC GLUCOMTR-MCNC: 197 MG/DL (ref 70–99)
GLUCOSE BLDC GLUCOMTR-MCNC: 198 MG/DL (ref 70–99)
GLUCOSE BLDC GLUCOMTR-MCNC: 202 MG/DL (ref 70–99)
GLUCOSE BLDC GLUCOMTR-MCNC: 202 MG/DL (ref 70–99)
GLUCOSE BLDC GLUCOMTR-MCNC: 208 MG/DL (ref 70–99)
GLUCOSE BLDC GLUCOMTR-MCNC: 212 MG/DL (ref 70–99)
GLUCOSE BLDC GLUCOMTR-MCNC: 212 MG/DL (ref 70–99)
GLUCOSE BLDC GLUCOMTR-MCNC: 213 MG/DL (ref 70–99)
GLUCOSE BLDC GLUCOMTR-MCNC: 213 MG/DL (ref 70–99)
GLUCOSE BLDC GLUCOMTR-MCNC: 214 MG/DL (ref 70–99)
GLUCOSE BLDC GLUCOMTR-MCNC: 219 MG/DL (ref 70–99)
GLUCOSE BLDC GLUCOMTR-MCNC: 223 MG/DL (ref 70–99)
GLUCOSE BLDC GLUCOMTR-MCNC: 228 MG/DL (ref 70–99)
GLUCOSE BLDC GLUCOMTR-MCNC: 228 MG/DL (ref 70–99)
GLUCOSE BLDC GLUCOMTR-MCNC: 230 MG/DL (ref 70–99)
GLUCOSE BLDC GLUCOMTR-MCNC: 231 MG/DL (ref 70–99)
GLUCOSE BLDC GLUCOMTR-MCNC: 232 MG/DL (ref 70–99)
GLUCOSE BLDC GLUCOMTR-MCNC: 239 MG/DL (ref 70–99)
GLUCOSE BLDC GLUCOMTR-MCNC: 240 MG/DL (ref 70–99)
GLUCOSE BLDC GLUCOMTR-MCNC: 241 MG/DL (ref 70–99)
GLUCOSE BLDC GLUCOMTR-MCNC: 244 MG/DL (ref 70–99)
GLUCOSE BLDC GLUCOMTR-MCNC: 245 MG/DL (ref 70–99)
GLUCOSE BLDC GLUCOMTR-MCNC: 249 MG/DL (ref 70–99)
GLUCOSE BLDC GLUCOMTR-MCNC: 252 MG/DL (ref 70–99)
GLUCOSE BLDC GLUCOMTR-MCNC: 270 MG/DL (ref 70–99)
GLUCOSE BLDC GLUCOMTR-MCNC: 271 MG/DL (ref 70–99)
GLUCOSE BLDC GLUCOMTR-MCNC: 80 MG/DL (ref 70–99)
GLUCOSE BLDC GLUCOMTR-MCNC: 96 MG/DL (ref 70–99)
GLUCOSE UR STRIP-MCNC: >=1000 MG/DL
GLUCOSE UR STRIP-MCNC: >=1000 MG/DL
GLUCOSE UR STRIP-MCNC: NEGATIVE MG/DL
HBA1C MFR BLD: 5.5 % (ref 0–5.6)
HBA1C MFR BLD: 6.3 % (ref 0–5.6)
HBA1C MFR BLD: 9.6 % (ref 0–5.6)
HCO3 BLD-SCNC: 25 MMOL/L (ref 21–28)
HCO3 BLD-SCNC: 25 MMOL/L (ref 21–28)
HCO3 BLDV-SCNC: 19 MMOL/L (ref 21–28)
HCO3 BLDV-SCNC: 26 MMOL/L (ref 21–28)
HCO3 BLDV-SCNC: 30 MMOL/L (ref 21–28)
HCT VFR BLD AUTO: 25.1 % (ref 40–53)
HCT VFR BLD AUTO: 25.1 % (ref 40–53)
HCT VFR BLD AUTO: 26.4 % (ref 40–53)
HCT VFR BLD AUTO: 32.1 % (ref 40–53)
HCT VFR BLD AUTO: 32.9 % (ref 40–53)
HCT VFR BLD AUTO: 33.4 % (ref 40–53)
HCT VFR BLD AUTO: 33.6 % (ref 40–53)
HCT VFR BLD AUTO: 34.6 % (ref 40–53)
HCT VFR BLD AUTO: 35 % (ref 40–53)
HCT VFR BLD AUTO: 35.9 % (ref 40–53)
HCT VFR BLD AUTO: 36.7 % (ref 40–53)
HCT VFR BLD AUTO: 36.8 % (ref 40–53)
HCT VFR BLD AUTO: 38.2 % (ref 40–53)
HCT VFR BLD AUTO: 38.3 % (ref 40–53)
HCT VFR BLD AUTO: 39 % (ref 40–53)
HCT VFR BLD AUTO: 40.5 % (ref 40–53)
HCT VFR BLD AUTO: 41.8 % (ref 40–53)
HCT VFR BLD AUTO: 41.8 % (ref 40–53)
HCT VFR BLD AUTO: 42.3 % (ref 40–53)
HCT VFR BLD AUTO: 43.3 % (ref 40–53)
HCT VFR BLD AUTO: 45.4 % (ref 40–53)
HCV AB SERPL QL IA: NONREACTIVE
HDLC SERPL-MCNC: 44 MG/DL
HGB BLD-MCNC: 10 G/DL (ref 13.3–17.7)
HGB BLD-MCNC: 10.5 G/DL (ref 13.3–17.7)
HGB BLD-MCNC: 10.9 G/DL (ref 13.3–17.7)
HGB BLD-MCNC: 11 G/DL (ref 13.3–17.7)
HGB BLD-MCNC: 11 G/DL (ref 13.3–17.7)
HGB BLD-MCNC: 11.5 G/DL (ref 13.3–17.7)
HGB BLD-MCNC: 11.7 G/DL (ref 13.3–17.7)
HGB BLD-MCNC: 12.1 G/DL (ref 13.3–17.7)
HGB BLD-MCNC: 12.2 G/DL (ref 13.3–17.7)
HGB BLD-MCNC: 12.3 G/DL (ref 13.3–17.7)
HGB BLD-MCNC: 12.4 G/DL (ref 13.3–17.7)
HGB BLD-MCNC: 12.4 G/DL (ref 13.3–17.7)
HGB BLD-MCNC: 12.5 G/DL (ref 13.3–17.7)
HGB BLD-MCNC: 12.8 G/DL (ref 13.3–17.7)
HGB BLD-MCNC: 12.9 G/DL (ref 13.3–17.7)
HGB BLD-MCNC: 13.2 G/DL (ref 13.3–17.7)
HGB BLD-MCNC: 13.8 G/DL (ref 13.3–17.7)
HGB BLD-MCNC: 14.2 G/DL (ref 13.3–17.7)
HGB BLD-MCNC: 14.3 G/DL (ref 13.3–17.7)
HGB BLD-MCNC: 7.5 G/DL (ref 13.3–17.7)
HGB BLD-MCNC: 7.6 G/DL (ref 13.3–17.7)
HGB BLD-MCNC: 7.9 G/DL (ref 13.3–17.7)
HGB UR QL STRIP: NEGATIVE
HOLD SPECIMEN HIT: NORMAL
HOLD SPECIMEN: NORMAL
IMM GRANULOCYTES # BLD: 0.1 10E3/UL
IMM GRANULOCYTES # BLD: 0.2 10E3/UL
IMM GRANULOCYTES NFR BLD: 1 %
IMM GRANULOCYTES NFR BLD: 2 %
INTERPRETATION ECG - MUSE: NORMAL
INTERPRETATION ECG - MUSE: NORMAL
IRON SATN MFR SERPL: 30 % (ref 15–46)
IRON SERPL-MCNC: 78 UG/DL (ref 35–180)
KETONES UR STRIP-MCNC: 60 MG/DL
KETONES UR STRIP-MCNC: NEGATIVE MG/DL
KETONES UR STRIP-MCNC: NEGATIVE MG/DL
LACTATE SERPL-SCNC: 0.9 MMOL/L (ref 0.7–2)
LACTATE SERPL-SCNC: 1.3 MMOL/L (ref 0.7–2)
LACTATE SERPL-SCNC: 2.2 MMOL/L (ref 0.7–2)
LDH SERPL L TO P-CCNC: 249 U/L (ref 85–227)
LDH SERPL L TO P-CCNC: 272 U/L (ref 85–227)
LDH SERPL L TO P-CCNC: 279 U/L (ref 85–227)
LDLC SERPL CALC-MCNC: 94 MG/DL
LEUKOCYTE ESTERASE UR QL STRIP: NEGATIVE
LYMPHOCYTES # BLD AUTO: 0.4 10E3/UL (ref 0.8–5.3)
LYMPHOCYTES # BLD AUTO: 0.9 10E3/UL (ref 0.8–5.3)
LYMPHOCYTES # BLD AUTO: 0.9 10E3/UL (ref 0.8–5.3)
LYMPHOCYTES # BLD AUTO: 1.1 10E3/UL (ref 0.8–5.3)
LYMPHOCYTES # BLD MANUAL: 0.1 10E3/UL (ref 0.8–5.3)
LYMPHOCYTES # BLD MANUAL: 0.1 10E3/UL (ref 0.8–5.3)
LYMPHOCYTES # BLD MANUAL: 0.2 10E3/UL (ref 0.8–5.3)
LYMPHOCYTES # BLD MANUAL: 0.3 10E3/UL (ref 0.8–5.3)
LYMPHOCYTES # BLD MANUAL: 0.5 10E3/UL (ref 0.8–5.3)
LYMPHOCYTES NFR BLD AUTO: 5 %
LYMPHOCYTES NFR BLD AUTO: 7 %
LYMPHOCYTES NFR BLD AUTO: 8 %
LYMPHOCYTES NFR BLD AUTO: 9 %
LYMPHOCYTES NFR BLD MANUAL: 15 %
LYMPHOCYTES NFR BLD MANUAL: 19 %
LYMPHOCYTES NFR BLD MANUAL: 3 %
LYMPHOCYTES NFR BLD MANUAL: 6 %
LYMPHOCYTES NFR BLD MANUAL: 9 %
M TB IFN-G BLD-IMP: NEGATIVE
M TB IFN-G CD4+ BCKGRND COR BLD-ACNC: 2.5 IU/ML
MAGNESIUM SERPL-MCNC: 1.6 MG/DL (ref 1.6–2.3)
MAGNESIUM SERPL-MCNC: 1.9 MG/DL (ref 1.6–2.3)
MAGNESIUM SERPL-MCNC: 2.1 MG/DL (ref 1.6–2.3)
MAGNESIUM SERPL-MCNC: 2.2 MG/DL (ref 1.6–2.3)
MAGNESIUM SERPL-MCNC: 2.2 MG/DL (ref 1.6–2.3)
MAGNESIUM SERPL-MCNC: 2.3 MG/DL (ref 1.6–2.3)
MCH RBC QN AUTO: 29.7 PG (ref 26.5–33)
MCH RBC QN AUTO: 29.8 PG (ref 26.5–33)
MCH RBC QN AUTO: 30 PG (ref 26.5–33)
MCH RBC QN AUTO: 30.1 PG (ref 26.5–33)
MCH RBC QN AUTO: 30.3 PG (ref 26.5–33)
MCH RBC QN AUTO: 30.3 PG (ref 26.5–33)
MCH RBC QN AUTO: 30.9 PG (ref 26.5–33)
MCH RBC QN AUTO: 30.9 PG (ref 26.5–33)
MCH RBC QN AUTO: 31.1 PG (ref 26.5–33)
MCH RBC QN AUTO: 31.1 PG (ref 26.5–33)
MCH RBC QN AUTO: 31.2 PG (ref 26.5–33)
MCH RBC QN AUTO: 31.3 PG (ref 26.5–33)
MCH RBC QN AUTO: 31.4 PG (ref 26.5–33)
MCH RBC QN AUTO: 31.9 PG (ref 26.5–33)
MCH RBC QN AUTO: 32.7 PG (ref 26.5–33)
MCH RBC QN AUTO: 32.9 PG (ref 26.5–33)
MCH RBC QN AUTO: 33.5 PG (ref 26.5–33)
MCH RBC QN AUTO: 33.7 PG (ref 26.5–33)
MCH RBC QN AUTO: 33.9 PG (ref 26.5–33)
MCHC RBC AUTO-ENTMCNC: 29.1 G/DL (ref 31.5–36.5)
MCHC RBC AUTO-ENTMCNC: 29.8 G/DL (ref 31.5–36.5)
MCHC RBC AUTO-ENTMCNC: 29.9 G/DL (ref 31.5–36.5)
MCHC RBC AUTO-ENTMCNC: 30.3 G/DL (ref 31.5–36.5)
MCHC RBC AUTO-ENTMCNC: 30.4 G/DL (ref 31.5–36.5)
MCHC RBC AUTO-ENTMCNC: 30.6 G/DL (ref 31.5–36.5)
MCHC RBC AUTO-ENTMCNC: 31.2 G/DL (ref 31.5–36.5)
MCHC RBC AUTO-ENTMCNC: 31.3 G/DL (ref 31.5–36.5)
MCHC RBC AUTO-ENTMCNC: 31.4 G/DL (ref 31.5–36.5)
MCHC RBC AUTO-ENTMCNC: 31.5 G/DL (ref 31.5–36.5)
MCHC RBC AUTO-ENTMCNC: 31.9 G/DL (ref 31.5–36.5)
MCHC RBC AUTO-ENTMCNC: 31.9 G/DL (ref 31.5–36.5)
MCHC RBC AUTO-ENTMCNC: 32 G/DL (ref 31.5–36.5)
MCHC RBC AUTO-ENTMCNC: 32.7 G/DL (ref 31.5–36.5)
MCHC RBC AUTO-ENTMCNC: 33 G/DL (ref 31.5–36.5)
MCHC RBC AUTO-ENTMCNC: 33 G/DL (ref 31.5–36.5)
MCHC RBC AUTO-ENTMCNC: 33.6 G/DL (ref 31.5–36.5)
MCHC RBC AUTO-ENTMCNC: 33.7 G/DL (ref 31.5–36.5)
MCHC RBC AUTO-ENTMCNC: 33.7 G/DL (ref 31.5–36.5)
MCV RBC AUTO: 100 FL (ref 78–100)
MCV RBC AUTO: 100 FL (ref 78–100)
MCV RBC AUTO: 101 FL (ref 78–100)
MCV RBC AUTO: 102 FL (ref 78–100)
MCV RBC AUTO: 103 FL (ref 78–100)
MCV RBC AUTO: 105 FL (ref 78–100)
MCV RBC AUTO: 95 FL (ref 78–100)
MCV RBC AUTO: 97 FL (ref 78–100)
MCV RBC AUTO: 97 FL (ref 78–100)
MCV RBC AUTO: 98 FL (ref 78–100)
MCV RBC AUTO: 99 FL (ref 78–100)
MICROALBUMIN UR-MCNC: 6 MG/L
MICROALBUMIN/CREAT UR: 13.64 MG/G CR (ref 0–17)
MITOGEN IGNF BCKGRD COR BLD-ACNC: -0.03 IU/ML
MITOGEN IGNF BCKGRD COR BLD-ACNC: -0.03 IU/ML
MONOCYTES # BLD AUTO: 0 10E3/UL (ref 0–1.3)
MONOCYTES # BLD AUTO: 0.7 10E3/UL (ref 0–1.3)
MONOCYTES # BLD AUTO: 1.3 10E3/UL (ref 0–1.3)
MONOCYTES # BLD AUTO: 1.4 10E3/UL (ref 0–1.3)
MONOCYTES # BLD MANUAL: 0 10E3/UL (ref 0–1.3)
MONOCYTES NFR BLD AUTO: 0 %
MONOCYTES NFR BLD AUTO: 10 %
MONOCYTES NFR BLD AUTO: 7 %
MONOCYTES NFR BLD AUTO: 9 %
MONOCYTES NFR BLD MANUAL: 0 %
MONOCYTES NFR BLD MANUAL: 0 %
MONOCYTES NFR BLD MANUAL: 1 %
MONOCYTES NFR BLD MANUAL: 3 %
MONOCYTES NFR BLD MANUAL: 3 %
MRSA DNA SPEC QL NAA+PROBE: NEGATIVE
MUCOUS THREADS #/AREA URNS LPF: PRESENT /LPF
NEUTROPHILS # BLD AUTO: 10.9 10E3/UL (ref 1.6–8.3)
NEUTROPHILS # BLD AUTO: 11 10E3/UL (ref 1.6–8.3)
NEUTROPHILS # BLD AUTO: 8.4 10E3/UL (ref 1.6–8.3)
NEUTROPHILS # BLD AUTO: 8.5 10E3/UL (ref 1.6–8.3)
NEUTROPHILS # BLD MANUAL: 0.7 10E3/UL (ref 1.6–8.3)
NEUTROPHILS # BLD MANUAL: 1.4 10E3/UL (ref 1.6–8.3)
NEUTROPHILS # BLD MANUAL: 2.5 10E3/UL (ref 1.6–8.3)
NEUTROPHILS # BLD MANUAL: 2.6 10E3/UL (ref 1.6–8.3)
NEUTROPHILS # BLD MANUAL: 5.3 10E3/UL (ref 1.6–8.3)
NEUTROPHILS NFR BLD AUTO: 82 %
NEUTROPHILS NFR BLD AUTO: 94 %
NEUTROPHILS NFR BLD MANUAL: 77 %
NEUTROPHILS NFR BLD MANUAL: 85 %
NEUTROPHILS NFR BLD MANUAL: 88 %
NEUTROPHILS NFR BLD MANUAL: 94 %
NEUTROPHILS NFR BLD MANUAL: 96 %
NITRATE UR QL: NEGATIVE
NONHDLC SERPL-MCNC: 128 MG/DL
NRBC # BLD AUTO: 0 10E3/UL
NRBC BLD AUTO-RTO: 0 /100
O2/TOTAL GAS SETTING VFR VENT: 100 %
O2/TOTAL GAS SETTING VFR VENT: 100 %
O2/TOTAL GAS SETTING VFR VENT: 5 %
O2/TOTAL GAS SETTING VFR VENT: 75 %
O2/TOTAL GAS SETTING VFR VENT: 95 %
OXYHGB MFR BLDV: 17 % (ref 70–75)
P AXIS - MUSE: 25 DEGREES
P AXIS - MUSE: 48 DEGREES
PCO2 BLD: 35 MM HG (ref 35–45)
PCO2 BLD: 37 MM HG (ref 35–45)
PCO2 BLDV: 33 MM HG (ref 40–50)
PCO2 BLDV: 46 MM HG (ref 40–50)
PCO2 BLDV: 52 MM HG (ref 40–50)
PF4 HEPARIN CMPLX AB SER QL: NEGATIVE
PH BLD: 7.44 [PH] (ref 7.35–7.45)
PH BLD: 7.46 [PH] (ref 7.35–7.45)
PH BLDV: 7.3 [PH] (ref 7.32–7.43)
PH BLDV: 7.36 [PH] (ref 7.32–7.43)
PH BLDV: 7.42 [PH] (ref 7.32–7.43)
PH UR STRIP: 5 [PH] (ref 5–7)
PH UR STRIP: 5 [PH] (ref 5–7)
PH UR STRIP: 5.5 [PH] (ref 5–7)
PHOSPHATE SERPL-MCNC: 1.1 MG/DL (ref 2.5–4.5)
PHOSPHATE SERPL-MCNC: 1.4 MG/DL (ref 2.5–4.5)
PHOSPHATE SERPL-MCNC: 1.4 MG/DL (ref 2.5–4.5)
PHOSPHATE SERPL-MCNC: 2 MG/DL (ref 2.5–4.5)
PHOSPHATE SERPL-MCNC: 2.7 MG/DL (ref 2.5–4.5)
PHOSPHATE SERPL-MCNC: 2.9 MG/DL (ref 2.5–4.5)
PHOSPHATE SERPL-MCNC: 3 MG/DL (ref 2.5–4.5)
PHOSPHATE SERPL-MCNC: 3 MG/DL (ref 2.5–4.5)
PHOSPHATE SERPL-MCNC: 3.3 MG/DL (ref 2.5–4.5)
PHOSPHATE SERPL-MCNC: 3.4 MG/DL (ref 2.5–4.5)
PHOSPHATE SERPL-MCNC: 3.6 MG/DL (ref 2.5–4.5)
PHOSPHATE SERPL-MCNC: 3.7 MG/DL (ref 2.5–4.5)
PHOSPHATE SERPL-MCNC: 3.8 MG/DL (ref 2.5–4.5)
PLAT MORPH BLD: ABNORMAL
PLATELET # BLD AUTO: 107 10E3/UL (ref 150–450)
PLATELET # BLD AUTO: 135 10E3/UL (ref 150–450)
PLATELET # BLD AUTO: 139 10E3/UL (ref 150–450)
PLATELET # BLD AUTO: 201 10E3/UL (ref 150–450)
PLATELET # BLD AUTO: 206 10E3/UL (ref 150–450)
PLATELET # BLD AUTO: 234 10E3/UL (ref 150–450)
PLATELET # BLD AUTO: 282 10E3/UL (ref 150–450)
PLATELET # BLD AUTO: 316 10E3/UL (ref 150–450)
PLATELET # BLD AUTO: 322 10E3/UL (ref 150–450)
PLATELET # BLD AUTO: 332 10E3/UL (ref 150–450)
PLATELET # BLD AUTO: 336 10E3/UL (ref 150–450)
PLATELET # BLD AUTO: 339 10E3/UL (ref 150–450)
PLATELET # BLD AUTO: 349 10E3/UL (ref 150–450)
PLATELET # BLD AUTO: 386 10E3/UL (ref 150–450)
PLATELET # BLD AUTO: 422 10E3/UL (ref 150–450)
PLATELET # BLD AUTO: 437 10E3/UL (ref 150–450)
PLATELET # BLD AUTO: 440 10E3/UL (ref 150–450)
PLATELET # BLD AUTO: 440 10E3/UL (ref 150–450)
PLATELET # BLD AUTO: 491 10E3/UL (ref 150–450)
PLATELET # BLD AUTO: 554 10E3/UL (ref 150–450)
PLATELET # BLD AUTO: 57 10E3/UL (ref 150–450)
PLATELET # BLD AUTO: 79 10E3/UL (ref 150–450)
PO2 BLD: 52 MM HG (ref 80–105)
PO2 BLD: 88 MM HG (ref 80–105)
PO2 BLDV: 16 MM HG (ref 25–47)
PO2 BLDV: 21 MM HG (ref 25–47)
PO2 BLDV: 74 MM HG (ref 25–47)
POTASSIUM BLD-SCNC: 3.4 MMOL/L (ref 3.4–5.3)
POTASSIUM BLD-SCNC: 3.4 MMOL/L (ref 3.4–5.3)
POTASSIUM BLD-SCNC: 3.5 MMOL/L (ref 3.4–5.3)
POTASSIUM BLD-SCNC: 3.5 MMOL/L (ref 3.4–5.3)
POTASSIUM BLD-SCNC: 3.6 MMOL/L (ref 3.4–5.3)
POTASSIUM BLD-SCNC: 3.7 MMOL/L (ref 3.4–5.3)
POTASSIUM BLD-SCNC: 3.8 MMOL/L (ref 3.4–5.3)
POTASSIUM BLD-SCNC: 3.9 MMOL/L (ref 3.4–5.3)
POTASSIUM BLD-SCNC: 4.1 MMOL/L (ref 3.4–5.3)
POTASSIUM BLD-SCNC: 4.2 MMOL/L (ref 3.4–5.3)
POTASSIUM BLD-SCNC: 4.3 MMOL/L (ref 3.4–5.3)
POTASSIUM BLD-SCNC: 4.3 MMOL/L (ref 3.4–5.3)
POTASSIUM BLD-SCNC: 4.4 MMOL/L (ref 3.4–5.3)
POTASSIUM BLD-SCNC: 4.5 MMOL/L (ref 3.4–5.3)
POTASSIUM BLD-SCNC: 4.5 MMOL/L (ref 3.4–5.3)
POTASSIUM BLD-SCNC: 4.6 MMOL/L (ref 3.4–5.3)
POTASSIUM BLD-SCNC: 5.3 MMOL/L (ref 3.4–5.3)
PR INTERVAL - MUSE: 162 MS
PR INTERVAL - MUSE: 184 MS
PROCALCITONIN SERPL-MCNC: 0.11 NG/ML
PROCALCITONIN SERPL-MCNC: 1.6 NG/ML
PROT SERPL-MCNC: 6.4 G/DL (ref 6.8–8.8)
PROT SERPL-MCNC: 7.1 G/DL (ref 6.8–8.8)
PROT SERPL-MCNC: 7.3 G/DL (ref 6.8–8.8)
PROT SERPL-MCNC: 7.4 G/DL (ref 6.8–8.8)
PROT UR-MCNC: 0.26 G/L
PROT/CREAT 24H UR: 0.2 G/G CR (ref 0–0.2)
PTH-INTACT SERPL-MCNC: 21 PG/ML (ref 18–80)
QRS DURATION - MUSE: 70 MS
QRS DURATION - MUSE: 92 MS
QT - MUSE: 298 MS
QT - MUSE: 354 MS
QTC - MUSE: 423 MS
QTC - MUSE: 433 MS
QUANTIFERON MITOGEN: 2.56 IU/ML
QUANTIFERON NIL TUBE: 0.06 IU/ML
QUANTIFERON TB1 TUBE: 0.03 IU/ML
QUANTIFERON TB2 TUBE: 0.03
R AXIS - MUSE: 19 DEGREES
R AXIS - MUSE: 94 DEGREES
RBC # BLD AUTO: 2.4 10E6/UL (ref 4.4–5.9)
RBC # BLD AUTO: 2.44 10E6/UL (ref 4.4–5.9)
RBC # BLD AUTO: 2.56 10E6/UL (ref 4.4–5.9)
RBC # BLD AUTO: 3.18 10E6/UL (ref 4.4–5.9)
RBC # BLD AUTO: 3.33 10E6/UL (ref 4.4–5.9)
RBC # BLD AUTO: 3.36 10E6/UL (ref 4.4–5.9)
RBC # BLD AUTO: 3.38 10E6/UL (ref 4.4–5.9)
RBC # BLD AUTO: 3.52 10E6/UL (ref 4.4–5.9)
RBC # BLD AUTO: 3.53 10E6/UL (ref 4.4–5.9)
RBC # BLD AUTO: 3.53 10E6/UL (ref 4.4–5.9)
RBC # BLD AUTO: 3.56 10E6/UL (ref 4.4–5.9)
RBC # BLD AUTO: 3.68 10E6/UL (ref 4.4–5.9)
RBC # BLD AUTO: 3.8 10E6/UL (ref 4.4–5.9)
RBC # BLD AUTO: 4.03 10E6/UL (ref 4.4–5.9)
RBC # BLD AUTO: 4.04 10E6/UL (ref 4.4–5.9)
RBC # BLD AUTO: 4.09 10E6/UL (ref 4.4–5.9)
RBC # BLD AUTO: 4.12 10E6/UL (ref 4.4–5.9)
RBC # BLD AUTO: 4.19 10E6/UL (ref 4.4–5.9)
RBC # BLD AUTO: 4.23 10E6/UL (ref 4.4–5.9)
RBC # BLD AUTO: 4.35 10E6/UL (ref 4.4–5.9)
RBC # BLD AUTO: 4.45 10E6/UL (ref 4.4–5.9)
RBC MORPH BLD: ABNORMAL
RBC URINE: 0 /HPF
RBC URINE: 0 /HPF
RBC URINE: <1 /HPF
RHEUMATOID FACT SER NEPH-ACNC: 33 IU/ML
SA TARGET DNA: NEGATIVE
SARS-COV-2 RNA RESP QL NAA+PROBE: NEGATIVE
SARS-COV-2 RNA RESP QL NAA+PROBE: NOT DETECTED
SARS-COV-2 RNA RESP QL NAA+PROBE: POSITIVE
SODIUM SERPL-SCNC: 129 MMOL/L (ref 133–144)
SODIUM SERPL-SCNC: 129 MMOL/L (ref 133–144)
SODIUM SERPL-SCNC: 130 MMOL/L (ref 133–144)
SODIUM SERPL-SCNC: 135 MMOL/L (ref 133–144)
SODIUM SERPL-SCNC: 135 MMOL/L (ref 133–144)
SODIUM SERPL-SCNC: 136 MMOL/L (ref 133–144)
SODIUM SERPL-SCNC: 137 MMOL/L (ref 133–144)
SODIUM SERPL-SCNC: 137 MMOL/L (ref 133–144)
SODIUM SERPL-SCNC: 138 MMOL/L (ref 133–144)
SODIUM SERPL-SCNC: 139 MMOL/L (ref 133–144)
SODIUM SERPL-SCNC: 139 MMOL/L (ref 133–144)
SODIUM SERPL-SCNC: 141 MMOL/L (ref 133–144)
SODIUM SERPL-SCNC: 143 MMOL/L (ref 133–144)
SODIUM SERPL-SCNC: 146 MMOL/L (ref 133–144)
SODIUM SERPL-SCNC: 146 MMOL/L (ref 133–144)
SODIUM SERPL-SCNC: 147 MMOL/L (ref 133–144)
SODIUM SERPL-SCNC: 152 MMOL/L (ref 133–144)
SP GR UR STRIP: 1.01 (ref 1–1.03)
SP GR UR STRIP: 1.02 (ref 1–1.03)
SP GR UR STRIP: 1.02 (ref 1–1.03)
SPECIMEN EXPIRATION DATE: NORMAL
SPECIMEN EXPIRATION DATE: NORMAL
SQUAMOUS EPITHELIAL: <1 /HPF
SYSTOLIC BLOOD PRESSURE - MUSE: NORMAL MMHG
SYSTOLIC BLOOD PRESSURE - MUSE: NORMAL MMHG
T AXIS - MUSE: 35 DEGREES
T AXIS - MUSE: 38 DEGREES
T4 FREE SERPL-MCNC: 1.03 NG/DL (ref 0.76–1.46)
TIBC SERPL-MCNC: 258 UG/DL (ref 240–430)
TRIGL SERPL-MCNC: 168 MG/DL
TROPONIN I SERPL HS-MCNC: 6 NG/L
TSH SERPL DL<=0.005 MIU/L-ACNC: 9.42 MU/L (ref 0.4–4)
URATE SERPL-MCNC: 5.7 MG/DL (ref 3.5–7.2)
URATE SERPL-MCNC: 7.2 MG/DL (ref 3.5–7.2)
URATE SERPL-MCNC: 7.2 MG/DL (ref 3.5–7.2)
UROBILINOGEN UR STRIP-MCNC: NORMAL MG/DL
VENTRICULAR RATE- MUSE: 121 BPM
VENTRICULAR RATE- MUSE: 90 BPM
WBC # BLD AUTO: 0.9 10E3/UL (ref 4–11)
WBC # BLD AUTO: 1.6 10E3/UL (ref 4–11)
WBC # BLD AUTO: 10.3 10E3/UL (ref 4–11)
WBC # BLD AUTO: 11 10E3/UL (ref 4–11)
WBC # BLD AUTO: 11 10E3/UL (ref 4–11)
WBC # BLD AUTO: 11.1 10E3/UL (ref 4–11)
WBC # BLD AUTO: 11.2 10E3/UL (ref 4–11)
WBC # BLD AUTO: 12.7 10E3/UL (ref 4–11)
WBC # BLD AUTO: 13.4 10E3/UL (ref 4–11)
WBC # BLD AUTO: 13.5 10E3/UL (ref 4–11)
WBC # BLD AUTO: 15 10E3/UL (ref 4–11)
WBC # BLD AUTO: 15.9 10E3/UL (ref 4–11)
WBC # BLD AUTO: 2.2 10E3/UL (ref 4–11)
WBC # BLD AUTO: 2.6 10E3/UL (ref 4–11)
WBC # BLD AUTO: 3 10E3/UL (ref 4–11)
WBC # BLD AUTO: 5.6 10E3/UL (ref 4–11)
WBC # BLD AUTO: 6.7 10E3/UL (ref 4–11)
WBC # BLD AUTO: 6.9 10E3/UL (ref 4–11)
WBC # BLD AUTO: 8.8 10E3/UL (ref 4–11)
WBC # BLD AUTO: 9 10E3/UL (ref 4–11)
WBC # BLD AUTO: 9.2 10E3/UL (ref 4–11)
WBC URINE: 0 /HPF
WBC URINE: 1 /HPF
WBC URINE: 1 /HPF

## 2021-01-01 PROCEDURE — 99233 SBSQ HOSP IP/OBS HIGH 50: CPT | Performed by: INTERNAL MEDICINE

## 2021-01-01 PROCEDURE — 120N000002 HC R&B MED SURG/OB UMMC

## 2021-01-01 PROCEDURE — 250N000011 HC RX IP 250 OP 636: Performed by: NURSE ANESTHETIST, CERTIFIED REGISTERED

## 2021-01-01 PROCEDURE — 250N000011 HC RX IP 250 OP 636: Performed by: EMERGENCY MEDICINE

## 2021-01-01 PROCEDURE — 97161 PT EVAL LOW COMPLEX 20 MIN: CPT | Mod: GP

## 2021-01-01 PROCEDURE — 85379 FIBRIN DEGRADATION QUANT: CPT | Performed by: INTERNAL MEDICINE

## 2021-01-01 PROCEDURE — 120N000001 HC R&B MED SURG/OB

## 2021-01-01 PROCEDURE — 97110 THERAPEUTIC EXERCISES: CPT | Mod: GP

## 2021-01-01 PROCEDURE — 999N000157 HC STATISTIC RCP TIME EA 10 MIN

## 2021-01-01 PROCEDURE — 71045 X-RAY EXAM CHEST 1 VIEW: CPT

## 2021-01-01 PROCEDURE — 85027 COMPLETE CBC AUTOMATED: CPT | Performed by: PATHOLOGY

## 2021-01-01 PROCEDURE — 36415 COLL VENOUS BLD VENIPUNCTURE: CPT | Performed by: INTERNAL MEDICINE

## 2021-01-01 PROCEDURE — 80053 COMPREHEN METABOLIC PANEL: CPT | Performed by: EMERGENCY MEDICINE

## 2021-01-01 PROCEDURE — 85014 HEMATOCRIT: CPT | Performed by: INTERNAL MEDICINE

## 2021-01-01 PROCEDURE — 99232 SBSQ HOSP IP/OBS MODERATE 35: CPT | Performed by: NURSE PRACTITIONER

## 2021-01-01 PROCEDURE — 83735 ASSAY OF MAGNESIUM: CPT | Performed by: NURSE PRACTITIONER

## 2021-01-01 PROCEDURE — 250N000011 HC RX IP 250 OP 636: Performed by: STUDENT IN AN ORGANIZED HEALTH CARE EDUCATION/TRAINING PROGRAM

## 2021-01-01 PROCEDURE — 87040 BLOOD CULTURE FOR BACTERIA: CPT | Performed by: EMERGENCY MEDICINE

## 2021-01-01 PROCEDURE — 84450 TRANSFERASE (AST) (SGOT): CPT

## 2021-01-01 PROCEDURE — 258N000003 HC RX IP 258 OP 636: Performed by: INTERNAL MEDICINE

## 2021-01-01 PROCEDURE — 80048 BASIC METABOLIC PNL TOTAL CA: CPT | Performed by: INTERNAL MEDICINE

## 2021-01-01 PROCEDURE — 250N000009 HC RX 250: Performed by: INTERNAL MEDICINE

## 2021-01-01 PROCEDURE — 85027 COMPLETE CBC AUTOMATED: CPT | Performed by: INTERNAL MEDICINE

## 2021-01-01 PROCEDURE — 85384 FIBRINOGEN ACTIVITY: CPT | Performed by: INTERNAL MEDICINE

## 2021-01-01 PROCEDURE — 84460 ALANINE AMINO (ALT) (SGPT): CPT | Performed by: PATHOLOGY

## 2021-01-01 PROCEDURE — 96375 TX/PRO/DX INJ NEW DRUG ADDON: CPT

## 2021-01-01 PROCEDURE — 0SRD0J9 REPLACEMENT OF LEFT KNEE JOINT WITH SYNTHETIC SUBSTITUTE, CEMENTED, OPEN APPROACH: ICD-10-PCS | Performed by: ORTHOPAEDIC SURGERY

## 2021-01-01 PROCEDURE — 85027 COMPLETE CBC AUTOMATED: CPT

## 2021-01-01 PROCEDURE — 36415 COLL VENOUS BLD VENIPUNCTURE: CPT | Performed by: ORTHOPAEDIC SURGERY

## 2021-01-01 PROCEDURE — 250N000012 HC RX MED GY IP 250 OP 636 PS 637: Performed by: STUDENT IN AN ORGANIZED HEALTH CARE EDUCATION/TRAINING PROGRAM

## 2021-01-01 PROCEDURE — 258N000003 HC RX IP 258 OP 636

## 2021-01-01 PROCEDURE — 94660 CPAP INITIATION&MGMT: CPT

## 2021-01-01 PROCEDURE — 82728 ASSAY OF FERRITIN: CPT | Performed by: PATHOLOGY

## 2021-01-01 PROCEDURE — 99285 EMERGENCY DEPT VISIT HI MDM: CPT | Mod: 25

## 2021-01-01 PROCEDURE — 80048 BASIC METABOLIC PNL TOTAL CA: CPT | Performed by: STUDENT IN AN ORGANIZED HEALTH CARE EDUCATION/TRAINING PROGRAM

## 2021-01-01 PROCEDURE — 278N000051 HC OR IMPLANT GENERAL: Performed by: ORTHOPAEDIC SURGERY

## 2021-01-01 PROCEDURE — 96365 THER/PROPH/DIAG IV INF INIT: CPT

## 2021-01-01 PROCEDURE — 97530 THERAPEUTIC ACTIVITIES: CPT | Mod: GP | Performed by: PHYSICAL THERAPIST

## 2021-01-01 PROCEDURE — 93005 ELECTROCARDIOGRAM TRACING: CPT

## 2021-01-01 PROCEDURE — 99239 HOSP IP/OBS DSCHRG MGMT >30: CPT | Performed by: NURSE PRACTITIONER

## 2021-01-01 PROCEDURE — 83615 LACTATE (LD) (LDH) ENZYME: CPT | Performed by: INTERNAL MEDICINE

## 2021-01-01 PROCEDURE — 99215 OFFICE O/P EST HI 40 MIN: CPT | Performed by: INTERNAL MEDICINE

## 2021-01-01 PROCEDURE — C9113 INJ PANTOPRAZOLE SODIUM, VIA: HCPCS | Performed by: NURSE PRACTITIONER

## 2021-01-01 PROCEDURE — 84460 ALANINE AMINO (ALT) (SGPT): CPT

## 2021-01-01 PROCEDURE — 250N000012 HC RX MED GY IP 250 OP 636 PS 637: Performed by: NURSE PRACTITIONER

## 2021-01-01 PROCEDURE — 87635 SARS-COV-2 COVID-19 AMP PRB: CPT | Performed by: EMERGENCY MEDICINE

## 2021-01-01 PROCEDURE — 36415 COLL VENOUS BLD VENIPUNCTURE: CPT | Performed by: STUDENT IN AN ORGANIZED HEALTH CARE EDUCATION/TRAINING PROGRAM

## 2021-01-01 PROCEDURE — 84100 ASSAY OF PHOSPHORUS: CPT | Performed by: INTERNAL MEDICINE

## 2021-01-01 PROCEDURE — 93971 EXTREMITY STUDY: CPT | Mod: LT

## 2021-01-01 PROCEDURE — 85027 COMPLETE CBC AUTOMATED: CPT | Performed by: NURSE PRACTITIONER

## 2021-01-01 PROCEDURE — 83735 ASSAY OF MAGNESIUM: CPT | Performed by: INTERNAL MEDICINE

## 2021-01-01 PROCEDURE — 84439 ASSAY OF FREE THYROXINE: CPT | Performed by: INTERNAL MEDICINE

## 2021-01-01 PROCEDURE — 250N000011 HC RX IP 250 OP 636: Performed by: INTERNAL MEDICINE

## 2021-01-01 PROCEDURE — 82040 ASSAY OF SERUM ALBUMIN: CPT | Performed by: INTERNAL MEDICINE

## 2021-01-01 PROCEDURE — 99204 OFFICE O/P NEW MOD 45 MIN: CPT | Performed by: FAMILY MEDICINE

## 2021-01-01 PROCEDURE — 82550 ASSAY OF CK (CPK): CPT | Performed by: INTERNAL MEDICINE

## 2021-01-01 PROCEDURE — 97530 THERAPEUTIC ACTIVITIES: CPT | Mod: GP

## 2021-01-01 PROCEDURE — 250N000013 HC RX MED GY IP 250 OP 250 PS 637: Performed by: PHYSICIAN ASSISTANT

## 2021-01-01 PROCEDURE — 85018 HEMOGLOBIN: CPT | Performed by: PHYSICIAN ASSISTANT

## 2021-01-01 PROCEDURE — 84156 ASSAY OF PROTEIN URINE: CPT | Performed by: PATHOLOGY

## 2021-01-01 PROCEDURE — 96374 THER/PROPH/DIAG INJ IV PUSH: CPT

## 2021-01-01 PROCEDURE — 27447 TOTAL KNEE ARTHROPLASTY: CPT | Mod: LT | Performed by: ORTHOPAEDIC SURGERY

## 2021-01-01 PROCEDURE — 36415 COLL VENOUS BLD VENIPUNCTURE: CPT | Performed by: PATHOLOGY

## 2021-01-01 PROCEDURE — 250N000009 HC RX 250: Performed by: NURSE ANESTHETIST, CERTIFIED REGISTERED

## 2021-01-01 PROCEDURE — 99024 POSTOP FOLLOW-UP VISIT: CPT | Mod: GC | Performed by: ORTHOPAEDIC SURGERY

## 2021-01-01 PROCEDURE — 86850 RBC ANTIBODY SCREEN: CPT | Performed by: NURSE PRACTITIONER

## 2021-01-01 PROCEDURE — 84443 ASSAY THYROID STIM HORMONE: CPT | Performed by: INTERNAL MEDICINE

## 2021-01-01 PROCEDURE — 85018 HEMOGLOBIN: CPT | Performed by: INTERNAL MEDICINE

## 2021-01-01 PROCEDURE — 86140 C-REACTIVE PROTEIN: CPT | Performed by: INTERNAL MEDICINE

## 2021-01-01 PROCEDURE — 250N000013 HC RX MED GY IP 250 OP 250 PS 637: Performed by: INTERNAL MEDICINE

## 2021-01-01 PROCEDURE — 36415 COLL VENOUS BLD VENIPUNCTURE: CPT | Performed by: EMERGENCY MEDICINE

## 2021-01-01 PROCEDURE — 250N000013 HC RX MED GY IP 250 OP 250 PS 637: Performed by: STUDENT IN AN ORGANIZED HEALTH CARE EDUCATION/TRAINING PROGRAM

## 2021-01-01 PROCEDURE — 97110 THERAPEUTIC EXERCISES: CPT | Mod: GP | Performed by: PHYSICAL THERAPIST

## 2021-01-01 PROCEDURE — C9803 HOPD COVID-19 SPEC COLLECT: HCPCS

## 2021-01-01 PROCEDURE — 250N000011 HC RX IP 250 OP 636

## 2021-01-01 PROCEDURE — 82310 ASSAY OF CALCIUM: CPT | Performed by: INTERNAL MEDICINE

## 2021-01-01 PROCEDURE — G0008 ADMIN INFLUENZA VIRUS VAC: HCPCS | Performed by: INTERNAL MEDICINE

## 2021-01-01 PROCEDURE — 250N000011 HC RX IP 250 OP 636: Performed by: NURSE PRACTITIONER

## 2021-01-01 PROCEDURE — 250N000011 HC RX IP 250 OP 636: Performed by: PHYSICIAN ASSISTANT

## 2021-01-01 PROCEDURE — 85652 RBC SED RATE AUTOMATED: CPT | Performed by: INTERNAL MEDICINE

## 2021-01-01 PROCEDURE — 710N000011 HC RECOVERY PHASE 1, LEVEL 3, PER MIN: Performed by: ORTHOPAEDIC SURGERY

## 2021-01-01 PROCEDURE — 120N000013 HC R&B IMCU

## 2021-01-01 PROCEDURE — 83605 ASSAY OF LACTIC ACID: CPT | Performed by: INTERNAL MEDICINE

## 2021-01-01 PROCEDURE — 250N000009 HC RX 250

## 2021-01-01 PROCEDURE — 83605 ASSAY OF LACTIC ACID: CPT | Performed by: EMERGENCY MEDICINE

## 2021-01-01 PROCEDURE — 84100 ASSAY OF PHOSPHORUS: CPT | Performed by: NURSE PRACTITIONER

## 2021-01-01 PROCEDURE — 97535 SELF CARE MNGMENT TRAINING: CPT | Mod: GO | Performed by: OCCUPATIONAL THERAPIST

## 2021-01-01 PROCEDURE — 84145 PROCALCITONIN (PCT): CPT | Performed by: INTERNAL MEDICINE

## 2021-01-01 PROCEDURE — 73562 X-RAY EXAM OF KNEE 3: CPT | Mod: LT | Performed by: INTERNAL MEDICINE

## 2021-01-01 PROCEDURE — 258N000003 HC RX IP 258 OP 636: Performed by: EMERGENCY MEDICINE

## 2021-01-01 PROCEDURE — 36415 COLL VENOUS BLD VENIPUNCTURE: CPT | Performed by: PHYSICIAN ASSISTANT

## 2021-01-01 PROCEDURE — 250N000011 HC RX IP 250 OP 636: Performed by: ANESTHESIOLOGY

## 2021-01-01 PROCEDURE — 99214 OFFICE O/P EST MOD 30 MIN: CPT | Performed by: INTERNAL MEDICINE

## 2021-01-01 PROCEDURE — U0005 INFEC AGEN DETEC AMPLI PROBE: HCPCS | Performed by: CLINICAL NURSE SPECIALIST

## 2021-01-01 PROCEDURE — G0439 PPPS, SUBSEQ VISIT: HCPCS | Performed by: INTERNAL MEDICINE

## 2021-01-01 PROCEDURE — 86140 C-REACTIVE PROTEIN: CPT | Performed by: PHYSICIAN ASSISTANT

## 2021-01-01 PROCEDURE — 84450 TRANSFERASE (AST) (SGOT): CPT | Performed by: PATHOLOGY

## 2021-01-01 PROCEDURE — 85018 HEMOGLOBIN: CPT | Performed by: STUDENT IN AN ORGANIZED HEALTH CARE EDUCATION/TRAINING PROGRAM

## 2021-01-01 PROCEDURE — 71275 CT ANGIOGRAPHY CHEST: CPT | Mod: ME

## 2021-01-01 PROCEDURE — 97116 GAIT TRAINING THERAPY: CPT | Mod: GP

## 2021-01-01 PROCEDURE — 97530 THERAPEUTIC ACTIVITIES: CPT | Mod: GO

## 2021-01-01 PROCEDURE — 86431 RHEUMATOID FACTOR QUANT: CPT | Performed by: INTERNAL MEDICINE

## 2021-01-01 PROCEDURE — 85652 RBC SED RATE AUTOMATED: CPT | Performed by: ORTHOPAEDIC SURGERY

## 2021-01-01 PROCEDURE — 370N000017 HC ANESTHESIA TECHNICAL FEE, PER MIN: Performed by: ORTHOPAEDIC SURGERY

## 2021-01-01 PROCEDURE — 250N000009 HC RX 250: Performed by: STUDENT IN AN ORGANIZED HEALTH CARE EDUCATION/TRAINING PROGRAM

## 2021-01-01 PROCEDURE — 250N000012 HC RX MED GY IP 250 OP 636 PS 637: Performed by: INTERNAL MEDICINE

## 2021-01-01 PROCEDURE — 76770 US EXAM ABDO BACK WALL COMP: CPT

## 2021-01-01 PROCEDURE — 258N000003 HC RX IP 258 OP 636: Performed by: PHYSICIAN ASSISTANT

## 2021-01-01 PROCEDURE — 97166 OT EVAL MOD COMPLEX 45 MIN: CPT | Mod: GO | Performed by: OCCUPATIONAL THERAPIST

## 2021-01-01 PROCEDURE — 99207 PR NO CHARGE LOS: CPT | Performed by: INTERNAL MEDICINE

## 2021-01-01 PROCEDURE — 80069 RENAL FUNCTION PANEL: CPT | Performed by: PATHOLOGY

## 2021-01-01 PROCEDURE — 99233 SBSQ HOSP IP/OBS HIGH 50: CPT | Performed by: STUDENT IN AN ORGANIZED HEALTH CARE EDUCATION/TRAINING PROGRAM

## 2021-01-01 PROCEDURE — 258N000003 HC RX IP 258 OP 636: Performed by: STUDENT IN AN ORGANIZED HEALTH CARE EDUCATION/TRAINING PROGRAM

## 2021-01-01 PROCEDURE — 83036 HEMOGLOBIN GLYCOSYLATED A1C: CPT | Performed by: INTERNAL MEDICINE

## 2021-01-01 PROCEDURE — 82043 UR ALBUMIN QUANTITATIVE: CPT | Performed by: INTERNAL MEDICINE

## 2021-01-01 PROCEDURE — 86803 HEPATITIS C AB TEST: CPT | Performed by: INTERNAL MEDICINE

## 2021-01-01 PROCEDURE — 36415 COLL VENOUS BLD VENIPUNCTURE: CPT

## 2021-01-01 PROCEDURE — 370N000003 HC ANESTHESIA WARD SERVICE: Performed by: NURSE ANESTHETIST, CERTIFIED REGISTERED

## 2021-01-01 PROCEDURE — 999N000054 HC STATISTIC EKG NON-CHARGEABLE

## 2021-01-01 PROCEDURE — 81001 URINALYSIS AUTO W/SCOPE: CPT | Performed by: EMERGENCY MEDICINE

## 2021-01-01 PROCEDURE — 82565 ASSAY OF CREATININE: CPT

## 2021-01-01 PROCEDURE — T1013 SIGN LANG/ORAL INTERPRETER: HCPCS | Mod: GT | Performed by: INTERNAL MEDICINE

## 2021-01-01 PROCEDURE — 82040 ASSAY OF SERUM ALBUMIN: CPT

## 2021-01-01 PROCEDURE — 80053 COMPREHEN METABOLIC PANEL: CPT | Performed by: INTERNAL MEDICINE

## 2021-01-01 PROCEDURE — 85041 AUTOMATED RBC COUNT: CPT | Performed by: STUDENT IN AN ORGANIZED HEALTH CARE EDUCATION/TRAINING PROGRAM

## 2021-01-01 PROCEDURE — 82040 ASSAY OF SERUM ALBUMIN: CPT | Performed by: PATHOLOGY

## 2021-01-01 PROCEDURE — 99204 OFFICE O/P NEW MOD 45 MIN: CPT | Performed by: INTERNAL MEDICINE

## 2021-01-01 PROCEDURE — 999N000185 HC STATISTIC TRANSPORT TIME EA 15 MIN

## 2021-01-01 PROCEDURE — 999N000190 HC STATISTIC VAT ROUNDS

## 2021-01-01 PROCEDURE — 82947 ASSAY GLUCOSE BLOOD QUANT: CPT | Performed by: ORTHOPAEDIC SURGERY

## 2021-01-01 PROCEDURE — 84132 ASSAY OF SERUM POTASSIUM: CPT | Performed by: STUDENT IN AN ORGANIZED HEALTH CARE EDUCATION/TRAINING PROGRAM

## 2021-01-01 PROCEDURE — 84550 ASSAY OF BLOOD/URIC ACID: CPT

## 2021-01-01 PROCEDURE — 82374 ASSAY BLOOD CARBON DIOXIDE: CPT | Performed by: STUDENT IN AN ORGANIZED HEALTH CARE EDUCATION/TRAINING PROGRAM

## 2021-01-01 PROCEDURE — 99232 SBSQ HOSP IP/OBS MODERATE 35: CPT | Performed by: INTERNAL MEDICINE

## 2021-01-01 PROCEDURE — 99291 CRITICAL CARE FIRST HOUR: CPT | Performed by: INTERNAL MEDICINE

## 2021-01-01 PROCEDURE — 86900 BLOOD TYPING SEROLOGIC ABO: CPT | Performed by: PHYSICIAN ASSISTANT

## 2021-01-01 PROCEDURE — 82803 BLOOD GASES ANY COMBINATION: CPT | Performed by: INTERNAL MEDICINE

## 2021-01-01 PROCEDURE — 93010 ELECTROCARDIOGRAM REPORT: CPT | Mod: 59 | Performed by: INTERNAL MEDICINE

## 2021-01-01 PROCEDURE — 82728 ASSAY OF FERRITIN: CPT | Performed by: INTERNAL MEDICINE

## 2021-01-01 PROCEDURE — T1013 SIGN LANG/ORAL INTERPRETER: HCPCS | Mod: U3,TEL

## 2021-01-01 PROCEDURE — 99223 1ST HOSP IP/OBS HIGH 75: CPT | Mod: AI | Performed by: STUDENT IN AN ORGANIZED HEALTH CARE EDUCATION/TRAINING PROGRAM

## 2021-01-01 PROCEDURE — 84550 ASSAY OF BLOOD/URIC ACID: CPT | Performed by: PATHOLOGY

## 2021-01-01 PROCEDURE — 86200 CCP ANTIBODY: CPT | Performed by: INTERNAL MEDICINE

## 2021-01-01 PROCEDURE — 84460 ALANINE AMINO (ALT) (SGPT): CPT | Performed by: INTERNAL MEDICINE

## 2021-01-01 PROCEDURE — 96361 HYDRATE IV INFUSION ADD-ON: CPT

## 2021-01-01 PROCEDURE — 99204 OFFICE O/P NEW MOD 45 MIN: CPT | Performed by: ORTHOPAEDIC SURGERY

## 2021-01-01 PROCEDURE — U0003 INFECTIOUS AGENT DETECTION BY NUCLEIC ACID (DNA OR RNA); SEVERE ACUTE RESPIRATORY SYNDROME CORONAVIRUS 2 (SARS-COV-2) (CORONAVIRUS DISEASE [COVID-19]), AMPLIFIED PROBE TECHNIQUE, MAKING USE OF HIGH THROUGHPUT TECHNOLOGIES AS DESCRIBED BY CMS-2020-01-R: HCPCS

## 2021-01-01 PROCEDURE — 36415 COLL VENOUS BLD VENIPUNCTURE: CPT | Performed by: NURSE PRACTITIONER

## 2021-01-01 PROCEDURE — 250N000009 HC RX 250: Performed by: ORTHOPAEDIC SURGERY

## 2021-01-01 PROCEDURE — U0005 INFEC AGEN DETEC AMPLI PROBE: HCPCS

## 2021-01-01 PROCEDURE — 5A09557 ASSISTANCE WITH RESPIRATORY VENTILATION, GREATER THAN 96 CONSECUTIVE HOURS, CONTINUOUS POSITIVE AIRWAY PRESSURE: ICD-10-PCS | Performed by: INTERNAL MEDICINE

## 2021-01-01 PROCEDURE — 99232 SBSQ HOSP IP/OBS MODERATE 35: CPT | Performed by: STUDENT IN AN ORGANIZED HEALTH CARE EDUCATION/TRAINING PROGRAM

## 2021-01-01 PROCEDURE — 250N000013 HC RX MED GY IP 250 OP 250 PS 637: Performed by: EMERGENCY MEDICINE

## 2021-01-01 PROCEDURE — P9604 ONE-WAY ALLOW PRORATED TRIP: HCPCS | Performed by: NURSE PRACTITIONER

## 2021-01-01 PROCEDURE — 70450 CT HEAD/BRAIN W/O DYE: CPT | Mod: ME

## 2021-01-01 PROCEDURE — 80048 BASIC METABOLIC PNL TOTAL CA: CPT | Performed by: NURSE PRACTITIONER

## 2021-01-01 PROCEDURE — 84550 ASSAY OF BLOOD/URIC ACID: CPT | Performed by: INTERNAL MEDICINE

## 2021-01-01 PROCEDURE — 99207 PR APP CREDIT; MD BILLING SHARED VISIT: CPT | Performed by: NURSE PRACTITIONER

## 2021-01-01 PROCEDURE — 87641 MR-STAPH DNA AMP PROBE: CPT | Performed by: STUDENT IN AN ORGANIZED HEALTH CARE EDUCATION/TRAINING PROGRAM

## 2021-01-01 PROCEDURE — 85049 AUTOMATED PLATELET COUNT: CPT | Performed by: PHYSICIAN ASSISTANT

## 2021-01-01 PROCEDURE — 82565 ASSAY OF CREATININE: CPT | Performed by: INTERNAL MEDICINE

## 2021-01-01 PROCEDURE — 81001 URINALYSIS AUTO W/SCOPE: CPT | Performed by: PATHOLOGY

## 2021-01-01 PROCEDURE — 82805 BLOOD GASES W/O2 SATURATION: CPT | Performed by: INTERNAL MEDICINE

## 2021-01-01 PROCEDURE — 99239 HOSP IP/OBS DSCHRG MGMT >30: CPT | Performed by: INTERNAL MEDICINE

## 2021-01-01 PROCEDURE — 90662 IIV NO PRSV INCREASED AG IM: CPT | Performed by: INTERNAL MEDICINE

## 2021-01-01 PROCEDURE — 82565 ASSAY OF CREATININE: CPT | Performed by: PHYSICIAN ASSISTANT

## 2021-01-01 PROCEDURE — 82565 ASSAY OF CREATININE: CPT | Performed by: STUDENT IN AN ORGANIZED HEALTH CARE EDUCATION/TRAINING PROGRAM

## 2021-01-01 PROCEDURE — 73120 X-RAY EXAM OF HAND: CPT | Mod: LT | Performed by: INTERNAL MEDICINE

## 2021-01-01 PROCEDURE — 82803 BLOOD GASES ANY COMBINATION: CPT | Performed by: EMERGENCY MEDICINE

## 2021-01-01 PROCEDURE — 99207 PR CONSULT E&M CHANGED TO SUBSEQUENT LEVEL: CPT | Performed by: NURSE PRACTITIONER

## 2021-01-01 PROCEDURE — 258N000001 HC RX 258: Performed by: ORTHOPAEDIC SURGERY

## 2021-01-01 PROCEDURE — 85025 COMPLETE CBC W/AUTO DIFF WBC: CPT | Performed by: STUDENT IN AN ORGANIZED HEALTH CARE EDUCATION/TRAINING PROGRAM

## 2021-01-01 PROCEDURE — 86481 TB AG RESPONSE T-CELL SUSP: CPT | Performed by: NURSE PRACTITIONER

## 2021-01-01 PROCEDURE — 82565 ASSAY OF CREATININE: CPT | Performed by: PATHOLOGY

## 2021-01-01 PROCEDURE — 999N000141 HC STATISTIC PRE-PROCEDURE NURSING ASSESSMENT: Performed by: ORTHOPAEDIC SURGERY

## 2021-01-01 PROCEDURE — 97161 PT EVAL LOW COMPLEX 20 MIN: CPT | Mod: GP | Performed by: PHYSICAL THERAPIST

## 2021-01-01 PROCEDURE — 82140 ASSAY OF AMMONIA: CPT | Performed by: INTERNAL MEDICINE

## 2021-01-01 PROCEDURE — 99223 1ST HOSP IP/OBS HIGH 75: CPT | Mod: AI | Performed by: INTERNAL MEDICINE

## 2021-01-01 PROCEDURE — 84484 ASSAY OF TROPONIN QUANT: CPT | Performed by: EMERGENCY MEDICINE

## 2021-01-01 PROCEDURE — 370N000003 HC ANESTHESIA WARD SERVICE

## 2021-01-01 PROCEDURE — 97535 SELF CARE MNGMENT TRAINING: CPT | Mod: GO

## 2021-01-01 PROCEDURE — XW033E5 INTRODUCTION OF REMDESIVIR ANTI-INFECTIVE INTO PERIPHERAL VEIN, PERCUTANEOUS APPROACH, NEW TECHNOLOGY GROUP 5: ICD-10-PCS | Performed by: INTERNAL MEDICINE

## 2021-01-01 PROCEDURE — 99204 OFFICE O/P NEW MOD 45 MIN: CPT | Mod: 25 | Performed by: INTERNAL MEDICINE

## 2021-01-01 PROCEDURE — 97166 OT EVAL MOD COMPLEX 45 MIN: CPT | Mod: GO

## 2021-01-01 PROCEDURE — 360N000077 HC SURGERY LEVEL 4, PER MIN: Performed by: ORTHOPAEDIC SURGERY

## 2021-01-01 PROCEDURE — 97116 GAIT TRAINING THERAPY: CPT | Mod: GP | Performed by: PHYSICAL THERAPIST

## 2021-01-01 PROCEDURE — U0003 INFECTIOUS AGENT DETECTION BY NUCLEIC ACID (DNA OR RNA); SEVERE ACUTE RESPIRATORY SYNDROME CORONAVIRUS 2 (SARS-COV-2) (CORONAVIRUS DISEASE [COVID-19]), AMPLIFIED PROBE TECHNIQUE, MAKING USE OF HIGH THROUGHPUT TECHNOLOGIES AS DESCRIBED BY CMS-2020-01-R: HCPCS | Performed by: NURSE PRACTITIONER

## 2021-01-01 PROCEDURE — 99292 CRITICAL CARE ADDL 30 MIN: CPT | Mod: 24 | Performed by: INTERNAL MEDICINE

## 2021-01-01 PROCEDURE — 84450 TRANSFERASE (AST) (SGOT): CPT | Performed by: INTERNAL MEDICINE

## 2021-01-01 PROCEDURE — 36600 WITHDRAWAL OF ARTERIAL BLOOD: CPT

## 2021-01-01 PROCEDURE — G0108 DIAB MANAGE TRN  PER INDIV: HCPCS | Mod: 95 | Performed by: DIETITIAN, REGISTERED

## 2021-01-01 PROCEDURE — 83550 IRON BINDING TEST: CPT | Performed by: PATHOLOGY

## 2021-01-01 PROCEDURE — 999N000065 XR KNEE PORT LEFT 1/2 VIEWS: Mod: LT

## 2021-01-01 PROCEDURE — 76882 US LMTD JT/FCL EVL NVASC XTR: CPT | Mod: RT

## 2021-01-01 PROCEDURE — 272N000001 HC OR GENERAL SUPPLY STERILE: Performed by: ORTHOPAEDIC SURGERY

## 2021-01-01 PROCEDURE — 87636 SARSCOV2 & INF A&B AMP PRB: CPT | Performed by: EMERGENCY MEDICINE

## 2021-01-01 PROCEDURE — 99204 OFFICE O/P NEW MOD 45 MIN: CPT | Performed by: PHYSICIAN ASSISTANT

## 2021-01-01 PROCEDURE — 99305 1ST NF CARE MODERATE MDM 35: CPT | Performed by: INTERNAL MEDICINE

## 2021-01-01 PROCEDURE — 83036 HEMOGLOBIN GLYCOSYLATED A1C: CPT | Performed by: PATHOLOGY

## 2021-01-01 PROCEDURE — 80061 LIPID PANEL: CPT | Performed by: INTERNAL MEDICINE

## 2021-01-01 PROCEDURE — 80069 RENAL FUNCTION PANEL: CPT | Performed by: INTERNAL MEDICINE

## 2021-01-01 PROCEDURE — C1776 JOINT DEVICE (IMPLANTABLE): HCPCS | Performed by: ORTHOPAEDIC SURGERY

## 2021-01-01 PROCEDURE — 86140 C-REACTIVE PROTEIN: CPT | Performed by: ORTHOPAEDIC SURGERY

## 2021-01-01 PROCEDURE — 99205 OFFICE O/P NEW HI 60 MIN: CPT | Performed by: INTERNAL MEDICINE

## 2021-01-01 PROCEDURE — 83970 ASSAY OF PARATHORMONE: CPT | Performed by: INTERNAL MEDICINE

## 2021-01-01 PROCEDURE — 86022 PLATELET ANTIBODIES: CPT | Performed by: INTERNAL MEDICINE

## 2021-01-01 PROCEDURE — 85025 COMPLETE CBC W/AUTO DIFF WBC: CPT | Performed by: EMERGENCY MEDICINE

## 2021-01-01 PROCEDURE — 99214 OFFICE O/P EST MOD 30 MIN: CPT | Mod: 25 | Performed by: INTERNAL MEDICINE

## 2021-01-01 PROCEDURE — 82306 VITAMIN D 25 HYDROXY: CPT | Performed by: INTERNAL MEDICINE

## 2021-01-01 PROCEDURE — 82803 BLOOD GASES ANY COMBINATION: CPT | Performed by: NURSE PRACTITIONER

## 2021-01-01 DEVICE — PATELLA
Type: IMPLANTABLE DEVICE | Site: KNEE | Status: FUNCTIONAL
Brand: TRIATHLON

## 2021-01-01 DEVICE — POSTERIOR STABILIZED FEMORAL
Type: IMPLANTABLE DEVICE | Site: KNEE | Status: FUNCTIONAL
Brand: TRIATHLON

## 2021-01-01 DEVICE — TIBIAL BEARING INSERT - PS
Type: IMPLANTABLE DEVICE | Site: KNEE | Status: FUNCTIONAL
Brand: TRIATHLON

## 2021-01-01 DEVICE — SIMPLEX® HV IS A FAST-SETTING ACRYLIC RESIN FOR USE IN BONE SURGERY. MIXING THE TWO SEPARATE STERILE COMPONENTS PRODUCES A DUCTILE BONE CEMENT WHICH, AFTER HARDENING, FIXES THE IMPLANT AND TRANSFERS STRESSES PRODUCED DURING MOVEMENT EVENLY TO THE BONE. SIMPLEX® HV CEMENT POWDER ALSO CONTAINS INSOLUBLE ZIRCONIUM DIOXIDE AS AN X-RAY CONTRAST MEDIUM. SIMPLEX® HV DOES NOT EMIT A SIGNAL AND DOES NOT POSE A SAFETY RISK IN A MAGNETIC RESONANCE ENVIRONMENT.
Type: IMPLANTABLE DEVICE | Site: KNEE | Status: FUNCTIONAL
Brand: SIMPLEX HV

## 2021-01-01 DEVICE — PRIMARY TIBIAL BASEPLATE
Type: IMPLANTABLE DEVICE | Site: KNEE | Status: FUNCTIONAL
Brand: TRIATHLON

## 2021-01-01 RX ORDER — NALOXONE HYDROCHLORIDE 0.4 MG/ML
0.2 INJECTION, SOLUTION INTRAMUSCULAR; INTRAVENOUS; SUBCUTANEOUS
Status: DISCONTINUED | OUTPATIENT
Start: 2021-01-01 | End: 2021-01-01 | Stop reason: HOSPADM

## 2021-01-01 RX ORDER — LABETALOL HYDROCHLORIDE 5 MG/ML
10 INJECTION, SOLUTION INTRAVENOUS
Status: DISCONTINUED | OUTPATIENT
Start: 2021-01-01 | End: 2021-01-01 | Stop reason: HOSPADM

## 2021-01-01 RX ORDER — ACETAMINOPHEN 325 MG/1
650 TABLET ORAL EVERY 6 HOURS PRN
Status: DISCONTINUED | OUTPATIENT
Start: 2021-01-01 | End: 2021-01-01 | Stop reason: HOSPADM

## 2021-01-01 RX ORDER — CARBOXYMETHYLCELLULOSE SODIUM 5 MG/ML
1 SOLUTION/ DROPS OPHTHALMIC EVERY 8 HOURS PRN
Status: DISCONTINUED | OUTPATIENT
Start: 2021-01-01 | End: 2021-01-01 | Stop reason: DRUGHIGH

## 2021-01-01 RX ORDER — LORAZEPAM 2 MG/ML
1 INJECTION INTRAMUSCULAR ONCE
Status: COMPLETED | OUTPATIENT
Start: 2021-01-01 | End: 2021-01-01

## 2021-01-01 RX ORDER — PANTOPRAZOLE SODIUM 40 MG/1
40 TABLET, DELAYED RELEASE ORAL
Status: DISCONTINUED | OUTPATIENT
Start: 2021-01-01 | End: 2021-01-01

## 2021-01-01 RX ORDER — AMOXICILLIN 250 MG
2 CAPSULE ORAL 2 TIMES DAILY PRN
Status: DISCONTINUED | OUTPATIENT
Start: 2021-01-01 | End: 2021-01-01 | Stop reason: HOSPADM

## 2021-01-01 RX ORDER — HALOPERIDOL 5 MG/ML
2.5 INJECTION INTRAMUSCULAR EVERY 6 HOURS PRN
Status: COMPLETED | OUTPATIENT
Start: 2021-01-01 | End: 2021-01-01

## 2021-01-01 RX ORDER — OXYCODONE HYDROCHLORIDE 5 MG/1
5 TABLET ORAL EVERY 4 HOURS PRN
Status: ON HOLD | COMMUNITY
End: 2021-01-01

## 2021-01-01 RX ORDER — NICOTINE POLACRILEX 4 MG
15-30 LOZENGE BUCCAL
Status: DISCONTINUED | OUTPATIENT
Start: 2021-01-01 | End: 2021-01-01

## 2021-01-01 RX ORDER — SODIUM CHLORIDE, SODIUM LACTATE, POTASSIUM CHLORIDE, CALCIUM CHLORIDE 600; 310; 30; 20 MG/100ML; MG/100ML; MG/100ML; MG/100ML
INJECTION, SOLUTION INTRAVENOUS CONTINUOUS
Status: DISCONTINUED | OUTPATIENT
Start: 2021-01-01 | End: 2021-01-01 | Stop reason: HOSPADM

## 2021-01-01 RX ORDER — ONDANSETRON 2 MG/ML
INJECTION INTRAMUSCULAR; INTRAVENOUS PRN
Status: DISCONTINUED | OUTPATIENT
Start: 2021-01-01 | End: 2021-01-01

## 2021-01-01 RX ORDER — GLUCOSAMINE HCL/CHONDROITIN SU 500-400 MG
CAPSULE ORAL
Qty: 100 EACH | Refills: 6 | Status: SHIPPED | OUTPATIENT
Start: 2021-01-01

## 2021-01-01 RX ORDER — LIDOCAINE HYDROCHLORIDE 20 MG/ML
INJECTION, SOLUTION INFILTRATION; PERINEURAL PRN
Status: DISCONTINUED | OUTPATIENT
Start: 2021-01-01 | End: 2021-01-01

## 2021-01-01 RX ORDER — IOPAMIDOL 755 MG/ML
64 INJECTION, SOLUTION INTRAVASCULAR ONCE
Status: COMPLETED | OUTPATIENT
Start: 2021-01-01 | End: 2021-01-01

## 2021-01-01 RX ORDER — MINERAL OIL/HYDROPHIL PETROLAT
OINTMENT (GRAM) TOPICAL
Status: DISCONTINUED | OUTPATIENT
Start: 2021-01-01 | End: 2021-01-01 | Stop reason: HOSPADM

## 2021-01-01 RX ORDER — LATANOPROST 50 UG/ML
1 SOLUTION/ DROPS OPHTHALMIC AT BEDTIME
Status: DISCONTINUED | OUTPATIENT
Start: 2021-01-01 | End: 2021-01-01

## 2021-01-01 RX ORDER — CEFUROXIME AXETIL 500 MG/1
500 TABLET ORAL EVERY 12 HOURS
Qty: 10 TABLET | Refills: 0 | Status: SHIPPED | OUTPATIENT
Start: 2021-01-01 | End: 2021-01-01

## 2021-01-01 RX ORDER — DEXAMETHASONE SODIUM PHOSPHATE 10 MG/ML
6 INJECTION, SOLUTION INTRAMUSCULAR; INTRAVENOUS ONCE
Status: COMPLETED | OUTPATIENT
Start: 2021-01-01 | End: 2021-01-01

## 2021-01-01 RX ORDER — ACETAMINOPHEN 325 MG/1
650 TABLET ORAL EVERY 4 HOURS PRN
COMMUNITY
End: 2021-01-01

## 2021-01-01 RX ORDER — NITROGLYCERIN 0.4 MG/1
0.4 TABLET SUBLINGUAL EVERY 5 MIN PRN
Status: DISCONTINUED | OUTPATIENT
Start: 2021-01-01 | End: 2021-01-01

## 2021-01-01 RX ORDER — ACETAMINOPHEN 325 MG/1
650 TABLET ORAL EVERY 4 HOURS PRN
Qty: 60 TABLET | Refills: 0 | Status: SHIPPED | OUTPATIENT
Start: 2021-01-01 | End: 2021-01-01

## 2021-01-01 RX ORDER — MORPHINE SULFATE 2 MG/ML
2 INJECTION, SOLUTION INTRAMUSCULAR; INTRAVENOUS
Status: COMPLETED | OUTPATIENT
Start: 2021-01-01 | End: 2021-01-01

## 2021-01-01 RX ORDER — AMOXICILLIN 250 MG
1 CAPSULE ORAL 2 TIMES DAILY
Status: DISCONTINUED | OUTPATIENT
Start: 2021-01-01 | End: 2021-01-01 | Stop reason: HOSPADM

## 2021-01-01 RX ORDER — FONDAPARINUX SODIUM 7.5 MG/.6ML
7.5 INJECTION SUBCUTANEOUS DAILY
Status: DISCONTINUED | OUTPATIENT
Start: 2021-01-01 | End: 2021-01-01

## 2021-01-01 RX ORDER — FENTANYL CITRATE 50 UG/ML
50 INJECTION, SOLUTION INTRAMUSCULAR; INTRAVENOUS EVERY 10 MIN PRN
Status: DISCONTINUED | OUTPATIENT
Start: 2021-01-01 | End: 2021-01-01

## 2021-01-01 RX ORDER — HALOPERIDOL 5 MG/ML
INJECTION INTRAMUSCULAR
Status: DISCONTINUED
Start: 2021-01-01 | End: 2021-01-01 | Stop reason: HOSPADM

## 2021-01-01 RX ORDER — FENTANYL CITRATE 50 UG/ML
50 INJECTION, SOLUTION INTRAMUSCULAR; INTRAVENOUS EVERY 30 MIN PRN
Status: DISCONTINUED | OUTPATIENT
Start: 2021-01-01 | End: 2021-01-01

## 2021-01-01 RX ORDER — NICOTINE POLACRILEX 4 MG
15-30 LOZENGE BUCCAL
Status: DISCONTINUED | OUTPATIENT
Start: 2021-01-01 | End: 2021-01-01 | Stop reason: HOSPADM

## 2021-01-01 RX ORDER — NALOXONE HYDROCHLORIDE 0.4 MG/ML
0.4 INJECTION, SOLUTION INTRAMUSCULAR; INTRAVENOUS; SUBCUTANEOUS
Status: DISCONTINUED | OUTPATIENT
Start: 2021-01-01 | End: 2021-01-01 | Stop reason: HOSPADM

## 2021-01-01 RX ORDER — GLYCOPYRROLATE 1 MG/1
2 TABLET ORAL EVERY 4 HOURS PRN
Status: DISCONTINUED | OUTPATIENT
Start: 2021-01-01 | End: 2021-01-01 | Stop reason: HOSPADM

## 2021-01-01 RX ORDER — LIDOCAINE HYDROCHLORIDE 20 MG/ML
5 JELLY TOPICAL
Status: DISCONTINUED | OUTPATIENT
Start: 2021-01-01 | End: 2021-01-01 | Stop reason: HOSPADM

## 2021-01-01 RX ORDER — LIDOCAINE 40 MG/G
CREAM TOPICAL
Status: DISCONTINUED | OUTPATIENT
Start: 2021-01-01 | End: 2021-01-01 | Stop reason: HOSPADM

## 2021-01-01 RX ORDER — PREDNISOLONE 5 MG/1
5 TABLET ORAL DAILY
Status: DISCONTINUED | OUTPATIENT
Start: 2021-01-01 | End: 2021-01-01

## 2021-01-01 RX ORDER — CEFTRIAXONE 2 G/1
2 INJECTION, POWDER, FOR SOLUTION INTRAMUSCULAR; INTRAVENOUS EVERY 24 HOURS
Status: DISCONTINUED | OUTPATIENT
Start: 2021-01-01 | End: 2021-01-01

## 2021-01-01 RX ORDER — ONDANSETRON 4 MG/1
4 TABLET, ORALLY DISINTEGRATING ORAL EVERY 6 HOURS PRN
Status: DISCONTINUED | OUTPATIENT
Start: 2021-01-01 | End: 2021-01-01 | Stop reason: HOSPADM

## 2021-01-01 RX ORDER — OXYCODONE HYDROCHLORIDE 5 MG/1
5 TABLET ORAL EVERY 4 HOURS PRN
Status: DISCONTINUED | OUTPATIENT
Start: 2021-01-01 | End: 2021-01-01 | Stop reason: ALTCHOICE

## 2021-01-01 RX ORDER — LANCING DEVICE/LANCETS
KIT MISCELLANEOUS
Qty: 1 EACH | Refills: 0 | Status: SHIPPED | OUTPATIENT
Start: 2021-01-01

## 2021-01-01 RX ORDER — POLYETHYLENE GLYCOL 3350 17 G/17G
17 POWDER, FOR SOLUTION ORAL DAILY
Status: DISCONTINUED | OUTPATIENT
Start: 2021-01-01 | End: 2021-01-01 | Stop reason: HOSPADM

## 2021-01-01 RX ORDER — AMOXICILLIN 250 MG
1 CAPSULE ORAL 2 TIMES DAILY
Qty: 30 TABLET | Refills: 0 | Status: SHIPPED | OUTPATIENT
Start: 2021-01-01 | End: 2021-01-01

## 2021-01-01 RX ORDER — CEFAZOLIN SODIUM 2 G/100ML
2 INJECTION, SOLUTION INTRAVENOUS SEE ADMIN INSTRUCTIONS
Status: DISCONTINUED | OUTPATIENT
Start: 2021-01-01 | End: 2021-01-01 | Stop reason: HOSPADM

## 2021-01-01 RX ORDER — CARBOXYMETHYLCELLULOSE SODIUM 5 MG/ML
1-2 SOLUTION/ DROPS OPHTHALMIC
Status: DISCONTINUED | OUTPATIENT
Start: 2021-01-01 | End: 2021-01-01 | Stop reason: HOSPADM

## 2021-01-01 RX ORDER — PROPOFOL 10 MG/ML
INJECTION, EMULSION INTRAVENOUS CONTINUOUS PRN
Status: DISCONTINUED | OUTPATIENT
Start: 2021-01-01 | End: 2021-01-01

## 2021-01-01 RX ORDER — PIPERACILLIN SODIUM, TAZOBACTAM SODIUM 3; .375 G/15ML; G/15ML
3.38 INJECTION, POWDER, LYOPHILIZED, FOR SOLUTION INTRAVENOUS EVERY 6 HOURS
Status: COMPLETED | OUTPATIENT
Start: 2021-01-01 | End: 2021-01-01

## 2021-01-01 RX ORDER — MORPHINE SULFATE 4 MG/ML
4 INJECTION, SOLUTION INTRAMUSCULAR; INTRAVENOUS ONCE
Status: COMPLETED | OUTPATIENT
Start: 2021-01-01 | End: 2021-01-01

## 2021-01-01 RX ORDER — PROCHLORPERAZINE MALEATE 5 MG
5 TABLET ORAL EVERY 6 HOURS PRN
Status: DISCONTINUED | OUTPATIENT
Start: 2021-01-01 | End: 2021-01-01 | Stop reason: HOSPADM

## 2021-01-01 RX ORDER — DEXAMETHASONE SODIUM PHOSPHATE 4 MG/ML
6 INJECTION, SOLUTION INTRA-ARTICULAR; INTRALESIONAL; INTRAMUSCULAR; INTRAVENOUS; SOFT TISSUE DAILY
Status: DISCONTINUED | OUTPATIENT
Start: 2021-01-01 | End: 2021-01-01

## 2021-01-01 RX ORDER — DEXAMETHASONE SODIUM PHOSPHATE 10 MG/ML
10 INJECTION, SOLUTION INTRAMUSCULAR; INTRAVENOUS EVERY 24 HOURS
Status: DISCONTINUED | OUTPATIENT
Start: 2021-01-01 | End: 2021-01-01

## 2021-01-01 RX ORDER — ACETAMINOPHEN 325 MG/1
975 TABLET ORAL EVERY 8 HOURS
Status: DISCONTINUED | OUTPATIENT
Start: 2021-01-01 | End: 2021-01-01 | Stop reason: HOSPADM

## 2021-01-01 RX ORDER — POLYETHYLENE GLYCOL 3350 17 G/17G
17 POWDER, FOR SOLUTION ORAL DAILY PRN
Status: DISCONTINUED | OUTPATIENT
Start: 2021-01-01 | End: 2021-01-01

## 2021-01-01 RX ORDER — BUPIVACAINE HYDROCHLORIDE 7.5 MG/ML
INJECTION, SOLUTION INTRASPINAL
Status: DISCONTINUED | OUTPATIENT
Start: 2021-01-01 | End: 2021-01-01

## 2021-01-01 RX ORDER — ONDANSETRON 2 MG/ML
4 INJECTION INTRAMUSCULAR; INTRAVENOUS EVERY 6 HOURS PRN
Status: DISCONTINUED | OUTPATIENT
Start: 2021-01-01 | End: 2021-01-01 | Stop reason: HOSPADM

## 2021-01-01 RX ORDER — PREDNISONE 5 MG/1
5 TABLET ORAL 2 TIMES DAILY
COMMUNITY
End: 2021-01-01

## 2021-01-01 RX ORDER — WATER 10 ML/10ML
INJECTION INTRAMUSCULAR; INTRAVENOUS; SUBCUTANEOUS
Status: COMPLETED
Start: 2021-01-01 | End: 2021-01-01

## 2021-01-01 RX ORDER — HALOPERIDOL 5 MG/ML
2.5 INJECTION INTRAMUSCULAR ONCE
Status: DISCONTINUED | OUTPATIENT
Start: 2021-01-01 | End: 2021-01-01 | Stop reason: CLARIF

## 2021-01-01 RX ORDER — FENTANYL CITRATE 50 UG/ML
25-50 INJECTION, SOLUTION INTRAMUSCULAR; INTRAVENOUS
Status: DISCONTINUED | OUTPATIENT
Start: 2021-01-01 | End: 2021-01-01 | Stop reason: HOSPADM

## 2021-01-01 RX ORDER — FLUMAZENIL 0.1 MG/ML
0.2 INJECTION, SOLUTION INTRAVENOUS
Status: DISCONTINUED | OUTPATIENT
Start: 2021-01-01 | End: 2021-01-01 | Stop reason: HOSPADM

## 2021-01-01 RX ORDER — PROCHLORPERAZINE 25 MG
12.5 SUPPOSITORY, RECTAL RECTAL EVERY 12 HOURS PRN
Status: DISCONTINUED | OUTPATIENT
Start: 2021-01-01 | End: 2021-01-01 | Stop reason: HOSPADM

## 2021-01-01 RX ORDER — DEXMEDETOMIDINE HYDROCHLORIDE 4 UG/ML
.1-.7 INJECTION, SOLUTION INTRAVENOUS CONTINUOUS
Status: DISCONTINUED | OUTPATIENT
Start: 2021-01-01 | End: 2021-01-01

## 2021-01-01 RX ORDER — HYDROMORPHONE HCL IN WATER/PF 6 MG/30 ML
0.4 PATIENT CONTROLLED ANALGESIA SYRINGE INTRAVENOUS
Status: DISCONTINUED | OUTPATIENT
Start: 2021-01-01 | End: 2021-01-01 | Stop reason: HOSPADM

## 2021-01-01 RX ORDER — LORAZEPAM 2 MG/ML
INJECTION INTRAMUSCULAR
Status: DISCONTINUED
Start: 2021-01-01 | End: 2021-01-01 | Stop reason: HOSPADM

## 2021-01-01 RX ORDER — MORPHINE SULFATE 2 MG/ML
1-2 INJECTION, SOLUTION INTRAMUSCULAR; INTRAVENOUS
Status: DISCONTINUED | OUTPATIENT
Start: 2021-01-01 | End: 2021-01-01 | Stop reason: HOSPADM

## 2021-01-01 RX ORDER — AMOXICILLIN 250 MG
1 CAPSULE ORAL 2 TIMES DAILY
Qty: 30 TABLET | Refills: 0 | DISCHARGE
Start: 2021-01-01 | End: 2021-01-01

## 2021-01-01 RX ORDER — POLYETHYLENE GLYCOL 3350 17 G/17G
17 POWDER, FOR SOLUTION ORAL DAILY PRN
Status: DISCONTINUED | OUTPATIENT
Start: 2021-01-01 | End: 2021-01-01 | Stop reason: HOSPADM

## 2021-01-01 RX ORDER — DEXAMETHASONE SODIUM PHOSPHATE 4 MG/ML
20 INJECTION, SOLUTION INTRA-ARTICULAR; INTRALESIONAL; INTRAMUSCULAR; INTRAVENOUS; SOFT TISSUE DAILY
Status: DISCONTINUED | OUTPATIENT
Start: 2021-01-01 | End: 2021-01-01

## 2021-01-01 RX ORDER — ONDANSETRON 2 MG/ML
4 INJECTION INTRAMUSCULAR; INTRAVENOUS EVERY 6 HOURS PRN
Status: DISCONTINUED | OUTPATIENT
Start: 2021-01-01 | End: 2021-01-01

## 2021-01-01 RX ORDER — BISACODYL 10 MG
10 SUPPOSITORY, RECTAL RECTAL
Status: DISCONTINUED | OUTPATIENT
Start: 2021-12-20 | End: 2021-01-01 | Stop reason: HOSPADM

## 2021-01-01 RX ORDER — HYDROMORPHONE HCL IN WATER/PF 6 MG/30 ML
0.2 PATIENT CONTROLLED ANALGESIA SYRINGE INTRAVENOUS EVERY 5 MIN PRN
Status: DISCONTINUED | OUTPATIENT
Start: 2021-01-01 | End: 2021-01-01 | Stop reason: HOSPADM

## 2021-01-01 RX ORDER — ONDANSETRON 4 MG/1
4 TABLET, ORALLY DISINTEGRATING ORAL EVERY 6 HOURS PRN
Status: DISCONTINUED | OUTPATIENT
Start: 2021-01-01 | End: 2021-01-01

## 2021-01-01 RX ORDER — NOREPINEPHRINE BITARTRATE 0.02 MG/ML
INJECTION, SOLUTION INTRAVENOUS
Status: COMPLETED
Start: 2021-01-01 | End: 2021-01-01

## 2021-01-01 RX ORDER — HALOPERIDOL 5 MG/ML
2 INJECTION INTRAMUSCULAR EVERY 6 HOURS PRN
Status: DISCONTINUED | OUTPATIENT
Start: 2021-01-01 | End: 2021-01-01 | Stop reason: HOSPADM

## 2021-01-01 RX ORDER — ONDANSETRON 4 MG/1
4 TABLET, ORALLY DISINTEGRATING ORAL EVERY 30 MIN PRN
Status: DISCONTINUED | OUTPATIENT
Start: 2021-01-01 | End: 2021-01-01 | Stop reason: HOSPADM

## 2021-01-01 RX ORDER — AMOXICILLIN 250 MG
1 CAPSULE ORAL 2 TIMES DAILY PRN
Status: DISCONTINUED | OUTPATIENT
Start: 2021-01-01 | End: 2021-01-01 | Stop reason: HOSPADM

## 2021-01-01 RX ORDER — NALOXONE HYDROCHLORIDE 0.4 MG/ML
0.2 INJECTION, SOLUTION INTRAMUSCULAR; INTRAVENOUS; SUBCUTANEOUS
Status: DISCONTINUED | OUTPATIENT
Start: 2021-01-01 | End: 2021-01-01

## 2021-01-01 RX ORDER — DEXTROSE MONOHYDRATE 25 G/50ML
25-50 INJECTION, SOLUTION INTRAVENOUS
Status: DISCONTINUED | OUTPATIENT
Start: 2021-01-01 | End: 2021-01-01 | Stop reason: HOSPADM

## 2021-01-01 RX ORDER — DEXTROSE MONOHYDRATE 100 MG/ML
INJECTION, SOLUTION INTRAVENOUS CONTINUOUS PRN
Status: DISCONTINUED | OUTPATIENT
Start: 2021-01-01 | End: 2021-01-01 | Stop reason: HOSPADM

## 2021-01-01 RX ORDER — LORAZEPAM 1 MG/1
1 TABLET ORAL
Status: DISCONTINUED | OUTPATIENT
Start: 2021-01-01 | End: 2021-01-01 | Stop reason: HOSPADM

## 2021-01-01 RX ORDER — ALLOPURINOL 300 MG/1
300 TABLET ORAL DAILY
Status: DISCONTINUED | OUTPATIENT
Start: 2021-01-01 | End: 2021-01-01 | Stop reason: HOSPADM

## 2021-01-01 RX ORDER — OXYCODONE HYDROCHLORIDE 5 MG/1
5 TABLET ORAL EVERY 4 HOURS PRN
Status: DISCONTINUED | OUTPATIENT
Start: 2021-01-01 | End: 2021-01-01 | Stop reason: HOSPADM

## 2021-01-01 RX ORDER — METHYLPREDNISOLONE 4 MG
TABLET, DOSE PACK ORAL
Qty: 21 TABLET | Refills: 0 | Status: SHIPPED | OUTPATIENT
Start: 2021-01-01 | End: 2021-01-01

## 2021-01-01 RX ORDER — SODIUM CHLORIDE 9 MG/ML
INJECTION, SOLUTION INTRAVENOUS CONTINUOUS
Status: DISCONTINUED | OUTPATIENT
Start: 2021-01-01 | End: 2021-01-01 | Stop reason: HOSPADM

## 2021-01-01 RX ORDER — PIPERACILLIN SODIUM, TAZOBACTAM SODIUM 4; .5 G/20ML; G/20ML
4.5 INJECTION, POWDER, LYOPHILIZED, FOR SOLUTION INTRAVENOUS ONCE
Status: COMPLETED | OUTPATIENT
Start: 2021-01-01 | End: 2021-01-01

## 2021-01-01 RX ORDER — DEXAMETHASONE SODIUM PHOSPHATE 10 MG/ML
INJECTION, SOLUTION INTRAMUSCULAR; INTRAVENOUS PRN
Status: DISCONTINUED | OUTPATIENT
Start: 2021-01-01 | End: 2021-01-01

## 2021-01-01 RX ORDER — OXYCODONE HYDROCHLORIDE 5 MG/1
5 TABLET ORAL EVERY 4 HOURS PRN
Qty: 40 TABLET | Refills: 0 | Status: SHIPPED | OUTPATIENT
Start: 2021-01-01 | End: 2021-01-01

## 2021-01-01 RX ORDER — LATANOPROST 50 UG/ML
1 SOLUTION/ DROPS OPHTHALMIC DAILY
Status: DISCONTINUED | OUTPATIENT
Start: 2021-01-01 | End: 2021-01-01 | Stop reason: HOSPADM

## 2021-01-01 RX ORDER — POLYETHYLENE GLYCOL 3350 17 G/17G
17 POWDER, FOR SOLUTION ORAL DAILY PRN
Qty: 510 G | Refills: 0
Start: 2021-01-01 | End: 2021-01-01

## 2021-01-01 RX ORDER — DEXTROSE MONOHYDRATE 50 MG/ML
INJECTION, SOLUTION INTRAVENOUS CONTINUOUS
Status: ACTIVE | OUTPATIENT
Start: 2021-01-01 | End: 2021-01-01

## 2021-01-01 RX ORDER — PREDNISOLONE 5 MG/1
5 TABLET ORAL 2 TIMES DAILY
Status: ON HOLD | COMMUNITY
End: 2021-01-01

## 2021-01-01 RX ORDER — CEFUROXIME AXETIL 500 MG/1
500 TABLET ORAL EVERY 12 HOURS SCHEDULED
Status: DISCONTINUED | OUTPATIENT
Start: 2021-01-01 | End: 2021-01-01 | Stop reason: HOSPADM

## 2021-01-01 RX ORDER — OXYCODONE HYDROCHLORIDE 10 MG/1
10 TABLET ORAL EVERY 4 HOURS PRN
Status: DISCONTINUED | OUTPATIENT
Start: 2021-01-01 | End: 2021-01-01 | Stop reason: HOSPADM

## 2021-01-01 RX ORDER — ATROPINE SULFATE 10 MG/ML
2 SOLUTION/ DROPS OPHTHALMIC EVERY 4 HOURS PRN
Status: DISCONTINUED | OUTPATIENT
Start: 2021-01-01 | End: 2021-01-01 | Stop reason: HOSPADM

## 2021-01-01 RX ORDER — BISACODYL 10 MG
10 SUPPOSITORY, RECTAL RECTAL DAILY PRN
Status: DISCONTINUED | OUTPATIENT
Start: 2021-01-01 | End: 2021-01-01 | Stop reason: HOSPADM

## 2021-01-01 RX ORDER — FUROSEMIDE 40 MG
40 TABLET ORAL DAILY
COMMUNITY
End: 2021-01-01

## 2021-01-01 RX ORDER — MULTIPLE VITAMINS W/ MINERALS TAB 9MG-400MCG
1 TAB ORAL DAILY
Status: DISCONTINUED | OUTPATIENT
Start: 2021-01-01 | End: 2021-01-01

## 2021-01-01 RX ORDER — FENTANYL CITRATE 50 UG/ML
25 INJECTION, SOLUTION INTRAMUSCULAR; INTRAVENOUS EVERY 5 MIN PRN
Status: DISCONTINUED | OUTPATIENT
Start: 2021-01-01 | End: 2021-01-01 | Stop reason: HOSPADM

## 2021-01-01 RX ORDER — SODIUM CHLORIDE 9 MG/ML
INJECTION, SOLUTION INTRAVENOUS CONTINUOUS
Status: DISCONTINUED | OUTPATIENT
Start: 2021-01-01 | End: 2021-01-01

## 2021-01-01 RX ORDER — PREDNISONE 5 MG/1
5 TABLET ORAL 2 TIMES DAILY
Status: DISCONTINUED | OUTPATIENT
Start: 2021-01-01 | End: 2021-01-01 | Stop reason: HOSPADM

## 2021-01-01 RX ORDER — LATANOPROST 50 UG/ML
1 SOLUTION/ DROPS OPHTHALMIC DAILY
COMMUNITY
End: 2021-01-01

## 2021-01-01 RX ORDER — ACETAMINOPHEN 325 MG/1
650 TABLET ORAL EVERY 4 HOURS PRN
Qty: 60 TABLET | Refills: 0 | DISCHARGE
Start: 2021-01-01

## 2021-01-01 RX ORDER — GLIPIZIDE AND METFORMIN HCL 2.5; 5 MG/1; MG/1
1 TABLET, FILM COATED ORAL
COMMUNITY
End: 2021-01-01 | Stop reason: SINTOL

## 2021-01-01 RX ORDER — CEFAZOLIN SODIUM 2 G/100ML
2 INJECTION, SOLUTION INTRAVENOUS
Status: COMPLETED | OUTPATIENT
Start: 2021-01-01 | End: 2021-01-01

## 2021-01-01 RX ORDER — SODIUM CHLORIDE, SODIUM LACTATE, POTASSIUM CHLORIDE, CALCIUM CHLORIDE 600; 310; 30; 20 MG/100ML; MG/100ML; MG/100ML; MG/100ML
INJECTION, SOLUTION INTRAVENOUS
Status: COMPLETED
Start: 2021-01-01 | End: 2021-01-01

## 2021-01-01 RX ORDER — SALIVA STIMULANT COMB. NO.3
1 SPRAY, NON-AEROSOL (ML) MUCOUS MEMBRANE
Status: DISCONTINUED | OUTPATIENT
Start: 2021-01-01 | End: 2021-01-01 | Stop reason: HOSPADM

## 2021-01-01 RX ORDER — PIPERACILLIN SODIUM, TAZOBACTAM SODIUM 3; .375 G/15ML; G/15ML
3.38 INJECTION, POWDER, LYOPHILIZED, FOR SOLUTION INTRAVENOUS EVERY 6 HOURS
Status: DISCONTINUED | OUTPATIENT
Start: 2021-01-01 | End: 2021-01-01

## 2021-01-01 RX ORDER — ONDANSETRON 2 MG/ML
4 INJECTION INTRAMUSCULAR; INTRAVENOUS EVERY 30 MIN PRN
Status: DISCONTINUED | OUTPATIENT
Start: 2021-01-01 | End: 2021-01-01

## 2021-01-01 RX ORDER — QUETIAPINE FUMARATE 25 MG/1
50 TABLET, FILM COATED ORAL 2 TIMES DAILY
Status: COMPLETED | OUTPATIENT
Start: 2021-01-01 | End: 2021-01-01

## 2021-01-01 RX ORDER — PREDNISONE 5 MG/1
5 TABLET ORAL DAILY
Qty: 50 TABLET | Refills: 1 | Status: SHIPPED | OUTPATIENT
Start: 2021-01-01 | End: 2021-01-01

## 2021-01-01 RX ORDER — PREDNISONE 5 MG/1
5 TABLET ORAL DAILY
Qty: 42 TABLET | Refills: 1 | Status: ON HOLD | OUTPATIENT
Start: 2021-01-01 | End: 2021-01-01

## 2021-01-01 RX ORDER — ACETAMINOPHEN 650 MG/1
650 SUPPOSITORY RECTAL EVERY 6 HOURS PRN
Status: DISCONTINUED | OUTPATIENT
Start: 2021-01-01 | End: 2021-01-01 | Stop reason: HOSPADM

## 2021-01-01 RX ORDER — CARBOXYMETHYLCELLULOSE SODIUM 5 MG/ML
1 SOLUTION/ DROPS OPHTHALMIC
Status: DISCONTINUED | OUTPATIENT
Start: 2021-01-01 | End: 2021-01-01

## 2021-01-01 RX ORDER — GLYCOPYRROLATE 0.2 MG/ML
0.2 INJECTION, SOLUTION INTRAMUSCULAR; INTRAVENOUS ONCE
Status: DISCONTINUED | OUTPATIENT
Start: 2021-01-01 | End: 2021-01-01 | Stop reason: HOSPADM

## 2021-01-01 RX ORDER — ALLOPURINOL 300 MG/1
300 TABLET ORAL EVERY MORNING
Status: DISCONTINUED | OUTPATIENT
Start: 2021-01-01 | End: 2021-01-01

## 2021-01-01 RX ORDER — TRANEXAMIC ACID 650 MG/1
1950 TABLET ORAL ONCE
Status: COMPLETED | OUTPATIENT
Start: 2021-01-01 | End: 2021-01-01

## 2021-01-01 RX ORDER — ALLOPURINOL 100 MG/1
100 TABLET ORAL DAILY
COMMUNITY
End: 2021-01-01 | Stop reason: DRUGHIGH

## 2021-01-01 RX ORDER — POLYETHYLENE GLYCOL 3350 17 G/17G
17 POWDER, FOR SOLUTION ORAL DAILY
Status: DISCONTINUED | OUTPATIENT
Start: 2021-01-01 | End: 2021-01-01

## 2021-01-01 RX ORDER — MORPHINE SULFATE 20 MG/ML
5-10 SOLUTION ORAL
Status: DISCONTINUED | OUTPATIENT
Start: 2021-01-01 | End: 2021-01-01 | Stop reason: HOSPADM

## 2021-01-01 RX ORDER — BUPIVACAINE HYDROCHLORIDE AND EPINEPHRINE 2.5; 5 MG/ML; UG/ML
INJECTION, SOLUTION INFILTRATION; PERINEURAL PRN
Status: DISCONTINUED | OUTPATIENT
Start: 2021-01-01 | End: 2021-01-01

## 2021-01-01 RX ORDER — HALOPERIDOL 5 MG/ML
2 INJECTION INTRAMUSCULAR ONCE
Status: COMPLETED | OUTPATIENT
Start: 2021-01-01 | End: 2021-01-01

## 2021-01-01 RX ORDER — PREDNISONE 20 MG/1
40 TABLET ORAL DAILY
Qty: 10 TABLET | Refills: 0 | Status: SHIPPED | OUTPATIENT
Start: 2021-01-01 | End: 2021-01-01

## 2021-01-01 RX ORDER — COLCHICINE 0.6 MG/1
0.6 TABLET ORAL 2 TIMES DAILY
Qty: 60 TABLET | Refills: 3 | Status: SHIPPED | OUTPATIENT
Start: 2021-01-01 | End: 2021-01-01

## 2021-01-01 RX ORDER — POLYETHYLENE GLYCOL 3350 17 G/17G
1 POWDER, FOR SOLUTION ORAL DAILY
Status: ON HOLD | COMMUNITY
End: 2021-01-01

## 2021-01-01 RX ORDER — ONDANSETRON 2 MG/ML
4 INJECTION INTRAMUSCULAR; INTRAVENOUS EVERY 30 MIN PRN
Status: DISCONTINUED | OUTPATIENT
Start: 2021-01-01 | End: 2021-01-01 | Stop reason: HOSPADM

## 2021-01-01 RX ORDER — NALOXONE HYDROCHLORIDE 0.4 MG/ML
0.4 INJECTION, SOLUTION INTRAMUSCULAR; INTRAVENOUS; SUBCUTANEOUS
Status: DISCONTINUED | OUTPATIENT
Start: 2021-01-01 | End: 2021-01-01

## 2021-01-01 RX ORDER — FAMOTIDINE 10 MG
10 TABLET ORAL DAILY PRN
Status: DISCONTINUED | OUTPATIENT
Start: 2021-01-01 | End: 2021-01-01 | Stop reason: HOSPADM

## 2021-01-01 RX ORDER — ALLOPURINOL 100 MG/1
100 TABLET ORAL DAILY
Qty: 30 TABLET | Refills: 3 | Status: SHIPPED | OUTPATIENT
Start: 2021-01-01 | End: 2021-01-01

## 2021-01-01 RX ORDER — NALOXONE HYDROCHLORIDE 0.4 MG/ML
0.1 INJECTION, SOLUTION INTRAMUSCULAR; INTRAVENOUS; SUBCUTANEOUS
Status: DISCONTINUED | OUTPATIENT
Start: 2021-01-01 | End: 2021-01-01 | Stop reason: HOSPADM

## 2021-01-01 RX ORDER — FENOFIBRATE 160 MG/1
50 TABLET ORAL DAILY
COMMUNITY
End: 2021-01-01

## 2021-01-01 RX ORDER — MORPHINE SULFATE 10 MG/5ML
5-10 SOLUTION ORAL
Status: DISCONTINUED | OUTPATIENT
Start: 2021-01-01 | End: 2021-01-01 | Stop reason: HOSPADM

## 2021-01-01 RX ORDER — ALLOPURINOL 100 MG/1
100 TABLET ORAL DAILY
Qty: 30 TABLET | Refills: 3 | Status: SHIPPED | OUTPATIENT
Start: 2021-01-01 | End: 2021-01-01 | Stop reason: DRUGHIGH

## 2021-01-01 RX ORDER — HYDROMORPHONE HCL IN WATER/PF 6 MG/30 ML
0.2 PATIENT CONTROLLED ANALGESIA SYRINGE INTRAVENOUS
Status: DISCONTINUED | OUTPATIENT
Start: 2021-01-01 | End: 2021-01-01 | Stop reason: HOSPADM

## 2021-01-01 RX ORDER — MORPHINE SULFATE 2 MG/ML
2 INJECTION, SOLUTION INTRAMUSCULAR; INTRAVENOUS ONCE
Status: COMPLETED | OUTPATIENT
Start: 2021-01-01 | End: 2021-01-01

## 2021-01-01 RX ORDER — GLYCOPYRROLATE 0.2 MG/ML
0.2 INJECTION, SOLUTION INTRAMUSCULAR; INTRAVENOUS EVERY 4 HOURS PRN
Status: DISCONTINUED | OUTPATIENT
Start: 2021-01-01 | End: 2021-01-01 | Stop reason: HOSPADM

## 2021-01-01 RX ORDER — CEFAZOLIN SODIUM 1 G/3ML
1 INJECTION, POWDER, FOR SOLUTION INTRAMUSCULAR; INTRAVENOUS EVERY 8 HOURS
Status: COMPLETED | OUTPATIENT
Start: 2021-01-01 | End: 2021-01-01

## 2021-01-01 RX ORDER — POLYETHYLENE GLYCOL 3350 17 G/17G
17 POWDER, FOR SOLUTION ORAL DAILY
Qty: 7 PACKET | Refills: 0 | Status: SHIPPED | OUTPATIENT
Start: 2021-01-01 | End: 2021-01-01

## 2021-01-01 RX ORDER — ACETAMINOPHEN 325 MG/1
650 TABLET ORAL EVERY 4 HOURS PRN
Status: DISCONTINUED | OUTPATIENT
Start: 2021-01-01 | End: 2021-01-01 | Stop reason: HOSPADM

## 2021-01-01 RX ORDER — ALLOPURINOL 100 MG/1
50 TABLET ORAL DAILY
Qty: 30 TABLET | Refills: 3 | Status: SHIPPED | OUTPATIENT
Start: 2021-01-01 | End: 2021-01-01

## 2021-01-01 RX ORDER — ALLOPURINOL 300 MG/1
300 TABLET ORAL DAILY
Qty: 30 TABLET | Refills: 2 | Status: SHIPPED | OUTPATIENT
Start: 2021-01-01

## 2021-01-01 RX ORDER — PREDNISONE 5 MG/1
5 TABLET ORAL 2 TIMES DAILY
COMMUNITY
Start: 2021-01-01

## 2021-01-01 RX ORDER — HALOPERIDOL 5 MG/ML
2 INJECTION INTRAMUSCULAR EVERY 6 HOURS PRN
Status: DISCONTINUED | OUTPATIENT
Start: 2021-01-01 | End: 2021-01-01

## 2021-01-01 RX ORDER — AMOXICILLIN 250 MG
1 CAPSULE ORAL 2 TIMES DAILY
Status: DISCONTINUED | OUTPATIENT
Start: 2021-01-01 | End: 2021-01-01

## 2021-01-01 RX ORDER — ALLOPURINOL 300 MG/1
300 TABLET ORAL DAILY
COMMUNITY
End: 2021-01-01

## 2021-01-01 RX ORDER — ROPIVACAINE IN 0.9% SOD CHL/PF 0.1 %
.03-.125 PLASTIC BAG, INJECTION (ML) EPIDURAL CONTINUOUS
Status: DISCONTINUED | OUTPATIENT
Start: 2021-01-01 | End: 2021-01-01

## 2021-01-01 RX ORDER — LORAZEPAM 2 MG/ML
1 INJECTION INTRAMUSCULAR
Status: DISCONTINUED | OUTPATIENT
Start: 2021-01-01 | End: 2021-01-01 | Stop reason: HOSPADM

## 2021-01-01 RX ORDER — QUETIAPINE FUMARATE 50 MG/1
50 TABLET, FILM COATED ORAL AT BEDTIME
Status: DISCONTINUED | OUTPATIENT
Start: 2021-01-01 | End: 2021-01-01

## 2021-01-01 RX ORDER — POLYETHYLENE GLYCOL 3350 17 G/17G
17 POWDER, FOR SOLUTION ORAL DAILY
Qty: 7 PACKET | Refills: 0 | DISCHARGE
Start: 2021-01-01 | End: 2021-01-01

## 2021-01-01 RX ORDER — ACETAMINOPHEN 650 MG/1
650 SUPPOSITORY RECTAL ONCE
Status: COMPLETED | OUTPATIENT
Start: 2021-01-01 | End: 2021-01-01

## 2021-01-01 RX ORDER — MAGNESIUM HYDROXIDE 1200 MG/15ML
LIQUID ORAL PRN
Status: DISCONTINUED | OUTPATIENT
Start: 2021-01-01 | End: 2021-01-01 | Stop reason: HOSPADM

## 2021-01-01 RX ORDER — AMOXICILLIN 250 MG
1 CAPSULE ORAL 2 TIMES DAILY PRN
COMMUNITY
End: 2021-01-01

## 2021-01-01 RX ORDER — ACETAMINOPHEN 325 MG/1
325-650 TABLET ORAL EVERY 6 HOURS PRN
Status: ON HOLD | COMMUNITY
End: 2021-01-01

## 2021-01-01 RX ORDER — ACETAMINOPHEN 325 MG/1
650 TABLET ORAL EVERY 4 HOURS PRN
Status: DISCONTINUED | OUTPATIENT
Start: 2021-01-01 | End: 2021-01-01

## 2021-01-01 RX ORDER — OLANZAPINE 10 MG/2ML
5 INJECTION, POWDER, FOR SOLUTION INTRAMUSCULAR
Status: COMPLETED | OUTPATIENT
Start: 2021-01-01 | End: 2021-01-01

## 2021-01-01 RX ORDER — DEXTROSE MONOHYDRATE 25 G/50ML
25-50 INJECTION, SOLUTION INTRAVENOUS
Status: DISCONTINUED | OUTPATIENT
Start: 2021-01-01 | End: 2021-01-01

## 2021-01-01 RX ORDER — LIDOCAINE 40 MG/G
CREAM TOPICAL
Status: DISCONTINUED | OUTPATIENT
Start: 2021-01-01 | End: 2021-01-01

## 2021-01-01 RX ADMIN — POTASSIUM PHOSPHATE, MONOBASIC AND POTASSIUM PHOSPHATE, DIBASIC 9 MMOL: 224; 236 INJECTION, SOLUTION, CONCENTRATE INTRAVENOUS at 10:31

## 2021-01-01 RX ADMIN — SODIUM CHLORIDE 1000 ML: 9 INJECTION, SOLUTION INTRAVENOUS at 16:17

## 2021-01-01 RX ADMIN — PREDNISONE 5 MG: 5 TABLET ORAL at 20:55

## 2021-01-01 RX ADMIN — IPRATROPIUM BROMIDE AND ALBUTEROL 2 PUFF: 20; 100 SPRAY, METERED RESPIRATORY (INHALATION) at 21:21

## 2021-01-01 RX ADMIN — ENOXAPARIN SODIUM 40 MG: 40 INJECTION SUBCUTANEOUS at 21:05

## 2021-01-01 RX ADMIN — DEXTROSE AND SODIUM CHLORIDE 1000 ML: 5; 450 INJECTION, SOLUTION INTRAVENOUS at 03:40

## 2021-01-01 RX ADMIN — FAMOTIDINE 20 MG: 10 INJECTION, SOLUTION INTRAVENOUS at 09:13

## 2021-01-01 RX ADMIN — SODIUM CHLORIDE 50 ML: 9 INJECTION, SOLUTION INTRAVENOUS at 22:20

## 2021-01-01 RX ADMIN — ACETAMINOPHEN 975 MG: 325 TABLET, FILM COATED ORAL at 11:38

## 2021-01-01 RX ADMIN — PROPOFOL 50 MCG/KG/MIN: 10 INJECTION, EMULSION INTRAVENOUS at 16:11

## 2021-01-01 RX ADMIN — ALLOPURINOL 300 MG: 300 TABLET ORAL at 09:47

## 2021-01-01 RX ADMIN — PIPERACILLIN AND TAZOBACTAM 3.38 G: 3; .375 INJECTION, POWDER, FOR SOLUTION INTRAVENOUS at 21:22

## 2021-01-01 RX ADMIN — ENOXAPARIN SODIUM 40 MG: 40 INJECTION SUBCUTANEOUS at 21:21

## 2021-01-01 RX ADMIN — LIDOCAINE HYDROCHLORIDE 40 MG: 20 INJECTION, SOLUTION INFILTRATION; PERINEURAL at 16:14

## 2021-01-01 RX ADMIN — FAMOTIDINE 20 MG: 10 INJECTION, SOLUTION INTRAVENOUS at 21:22

## 2021-01-01 RX ADMIN — SODIUM CHLORIDE, POTASSIUM CHLORIDE, SODIUM LACTATE AND CALCIUM CHLORIDE 1000 ML: 600; 310; 30; 20 INJECTION, SOLUTION INTRAVENOUS at 10:13

## 2021-01-01 RX ADMIN — LATANOPROST 1 DROP: 50 SOLUTION/ DROPS OPHTHALMIC at 21:24

## 2021-01-01 RX ADMIN — SENNOSIDES AND DOCUSATE SODIUM 1 TABLET: 50; 8.6 TABLET ORAL at 09:47

## 2021-01-01 RX ADMIN — DEXAMETHASONE SODIUM PHOSPHATE 20 MG: 4 INJECTION, SOLUTION INTRAMUSCULAR; INTRAVENOUS at 11:03

## 2021-01-01 RX ADMIN — IPRATROPIUM BROMIDE AND ALBUTEROL 2 PUFF: 20; 100 SPRAY, METERED RESPIRATORY (INHALATION) at 21:26

## 2021-01-01 RX ADMIN — ACETAMINOPHEN 650 MG: 325 TABLET, FILM COATED ORAL at 21:02

## 2021-01-01 RX ADMIN — LORAZEPAM 1 MG: 2 INJECTION INTRAMUSCULAR; INTRAVENOUS at 22:49

## 2021-01-01 RX ADMIN — PIPERACILLIN AND TAZOBACTAM 3.38 G: 3; .375 INJECTION, POWDER, FOR SOLUTION INTRAVENOUS at 16:41

## 2021-01-01 RX ADMIN — LATANOPROST 1 DROP: 50 SOLUTION/ DROPS OPHTHALMIC at 21:15

## 2021-01-01 RX ADMIN — OXYCODONE HYDROCHLORIDE 5 MG: 5 TABLET ORAL at 11:06

## 2021-01-01 RX ADMIN — Medication 2 G: at 16:09

## 2021-01-01 RX ADMIN — CEFAZOLIN 1 G: 1 INJECTION, POWDER, FOR SOLUTION INTRAMUSCULAR; INTRAVENOUS at 11:08

## 2021-01-01 RX ADMIN — SENNOSIDES AND DOCUSATE SODIUM 1 TABLET: 50; 8.6 TABLET ORAL at 22:28

## 2021-01-01 RX ADMIN — PIPERACILLIN AND TAZOBACTAM 3.38 G: 3; .375 INJECTION, POWDER, FOR SOLUTION INTRAVENOUS at 22:40

## 2021-01-01 RX ADMIN — SENNOSIDES AND DOCUSATE SODIUM 1 TABLET: 50; 8.6 TABLET ORAL at 20:34

## 2021-01-01 RX ADMIN — BUPIVACAINE HYDROCHLORIDE AND EPINEPHRINE BITARTRATE 20 ML: 2.5; .005 INJECTION, SOLUTION INFILTRATION; PERINEURAL at 12:28

## 2021-01-01 RX ADMIN — PREDNISONE 5 MG: 5 TABLET ORAL at 21:18

## 2021-01-01 RX ADMIN — REMDESIVIR 100 MG: 100 INJECTION, POWDER, LYOPHILIZED, FOR SOLUTION INTRAVENOUS at 18:24

## 2021-01-01 RX ADMIN — PIPERACILLIN SODIUM AND TAZOBACTAM SODIUM 3.38 G: 3; .375 INJECTION, POWDER, LYOPHILIZED, FOR SOLUTION INTRAVENOUS at 18:13

## 2021-01-01 RX ADMIN — INSULIN ASPART 1 UNITS: 100 INJECTION, SOLUTION INTRAVENOUS; SUBCUTANEOUS at 17:54

## 2021-01-01 RX ADMIN — REMDESIVIR 100 MG: 100 INJECTION, POWDER, LYOPHILIZED, FOR SOLUTION INTRAVENOUS at 16:30

## 2021-01-01 RX ADMIN — DEXAMETHASONE SODIUM PHOSPHATE 20 MG: 4 INJECTION, SOLUTION INTRAMUSCULAR; INTRAVENOUS at 08:07

## 2021-01-01 RX ADMIN — INSULIN ASPART 2 UNITS: 100 INJECTION, SOLUTION INTRAVENOUS; SUBCUTANEOUS at 16:47

## 2021-01-01 RX ADMIN — INSULIN ASPART 1 UNITS: 100 INJECTION, SOLUTION INTRAVENOUS; SUBCUTANEOUS at 18:13

## 2021-01-01 RX ADMIN — DOCUSATE SODIUM AND SENNOSIDES 1 TABLET: 8.6; 5 TABLET ORAL at 20:55

## 2021-01-01 RX ADMIN — QUETIAPINE FUMARATE 50 MG: 25 TABLET ORAL at 22:50

## 2021-01-01 RX ADMIN — IPRATROPIUM BROMIDE AND ALBUTEROL 2 PUFF: 20; 100 SPRAY, METERED RESPIRATORY (INHALATION) at 17:55

## 2021-01-01 RX ADMIN — ACETAMINOPHEN 975 MG: 325 TABLET, FILM COATED ORAL at 11:06

## 2021-01-01 RX ADMIN — ONDANSETRON 4 MG: 2 INJECTION INTRAMUSCULAR; INTRAVENOUS at 12:25

## 2021-01-01 RX ADMIN — LATANOPROST 1 DROP: 50 SOLUTION/ DROPS OPHTHALMIC at 21:18

## 2021-01-01 RX ADMIN — OXYCODONE HYDROCHLORIDE 5 MG: 5 TABLET ORAL at 04:44

## 2021-01-01 RX ADMIN — INSULIN ASPART 1 UNITS: 100 INJECTION, SOLUTION INTRAVENOUS; SUBCUTANEOUS at 14:24

## 2021-01-01 RX ADMIN — IPRATROPIUM BROMIDE AND ALBUTEROL 2 PUFF: 20; 100 SPRAY, METERED RESPIRATORY (INHALATION) at 13:50

## 2021-01-01 RX ADMIN — PIPERACILLIN AND TAZOBACTAM 3.38 G: 3; .375 INJECTION, POWDER, FOR SOLUTION INTRAVENOUS at 03:35

## 2021-01-01 RX ADMIN — PIPERACILLIN AND TAZOBACTAM 3.38 G: 3; .375 INJECTION, POWDER, FOR SOLUTION INTRAVENOUS at 19:45

## 2021-01-01 RX ADMIN — SODIUM CHLORIDE, POTASSIUM CHLORIDE, SODIUM LACTATE AND CALCIUM CHLORIDE: 600; 310; 30; 20 INJECTION, SOLUTION INTRAVENOUS at 18:03

## 2021-01-01 RX ADMIN — ALLOPURINOL 300 MG: 300 TABLET ORAL at 09:04

## 2021-01-01 RX ADMIN — ENOXAPARIN SODIUM 40 MG: 40 INJECTION SUBCUTANEOUS at 22:30

## 2021-01-01 RX ADMIN — DEXAMETHASONE SODIUM PHOSPHATE 20 MG: 4 INJECTION, SOLUTION INTRAMUSCULAR; INTRAVENOUS at 08:19

## 2021-01-01 RX ADMIN — ACETAMINOPHEN 650 MG: 325 TABLET, FILM COATED ORAL at 21:29

## 2021-01-01 RX ADMIN — PIPERACILLIN AND TAZOBACTAM 3.38 G: 3; .375 INJECTION, POWDER, FOR SOLUTION INTRAVENOUS at 17:00

## 2021-01-01 RX ADMIN — PREDNISONE 5 MG: 5 TABLET ORAL at 08:48

## 2021-01-01 RX ADMIN — DOCUSATE SODIUM AND SENNOSIDES 1 TABLET: 8.6; 5 TABLET ORAL at 08:48

## 2021-01-01 RX ADMIN — FAMOTIDINE 20 MG: 10 INJECTION, SOLUTION INTRAVENOUS at 19:45

## 2021-01-01 RX ADMIN — INSULIN ASPART 1 UNITS: 100 INJECTION, SOLUTION INTRAVENOUS; SUBCUTANEOUS at 11:49

## 2021-01-01 RX ADMIN — ENOXAPARIN SODIUM 40 MG: 40 INJECTION SUBCUTANEOUS at 11:46

## 2021-01-01 RX ADMIN — OXYCODONE HYDROCHLORIDE 5 MG: 5 TABLET ORAL at 06:02

## 2021-01-01 RX ADMIN — INSULIN ASPART 1 UNITS: 100 INJECTION, SOLUTION INTRAVENOUS; SUBCUTANEOUS at 09:23

## 2021-01-01 RX ADMIN — EMPAGLIFLOZIN 10 MG: 10 TABLET, FILM COATED ORAL at 11:51

## 2021-01-01 RX ADMIN — ACETAMINOPHEN 650 MG: 325 TABLET, FILM COATED ORAL at 15:40

## 2021-01-01 RX ADMIN — PIPERACILLIN AND TAZOBACTAM 3.38 G: 3; .375 INJECTION, POWDER, FOR SOLUTION INTRAVENOUS at 03:46

## 2021-01-01 RX ADMIN — PIPERACILLIN AND TAZOBACTAM 3.38 G: 3; .375 INJECTION, POWDER, FOR SOLUTION INTRAVENOUS at 10:22

## 2021-01-01 RX ADMIN — DEXAMETHASONE SODIUM PHOSPHATE 6 MG: 10 INJECTION INTRAMUSCULAR; INTRAVENOUS at 14:50

## 2021-01-01 RX ADMIN — INSULIN ASPART 1 UNITS: 100 INJECTION, SOLUTION INTRAVENOUS; SUBCUTANEOUS at 09:04

## 2021-01-01 RX ADMIN — PIPERACILLIN AND TAZOBACTAM 3.38 G: 3; .375 INJECTION, POWDER, FOR SOLUTION INTRAVENOUS at 22:51

## 2021-01-01 RX ADMIN — SENNOSIDES AND DOCUSATE SODIUM 1 TABLET: 50; 8.6 TABLET ORAL at 09:13

## 2021-01-01 RX ADMIN — ACETAMINOPHEN 650 MG: 325 TABLET, FILM COATED ORAL at 11:01

## 2021-01-01 RX ADMIN — SENNOSIDES AND DOCUSATE SODIUM 1 TABLET: 50; 8.6 TABLET ORAL at 22:51

## 2021-01-01 RX ADMIN — PREDNISONE 5 MG: 5 TABLET ORAL at 08:30

## 2021-01-01 RX ADMIN — VANCOMYCIN HYDROCHLORIDE 1500 MG: 1 INJECTION, POWDER, LYOPHILIZED, FOR SOLUTION INTRAVENOUS at 21:30

## 2021-01-01 RX ADMIN — ONDANSETRON 4 MG: 2 INJECTION INTRAMUSCULAR; INTRAVENOUS at 18:09

## 2021-01-01 RX ADMIN — FAMOTIDINE 20 MG: 10 INJECTION, SOLUTION INTRAVENOUS at 20:34

## 2021-01-01 RX ADMIN — FENTANYL CITRATE 50 MCG: 50 INJECTION, SOLUTION INTRAMUSCULAR; INTRAVENOUS at 11:30

## 2021-01-01 RX ADMIN — IPRATROPIUM BROMIDE AND ALBUTEROL 2 PUFF: 20; 100 SPRAY, METERED RESPIRATORY (INHALATION) at 17:27

## 2021-01-01 RX ADMIN — OXYCODONE HYDROCHLORIDE 5 MG: 5 TABLET ORAL at 13:33

## 2021-01-01 RX ADMIN — ALLOPURINOL 300 MG: 300 TABLET ORAL at 18:11

## 2021-01-01 RX ADMIN — INSULIN ASPART 2 UNITS: 100 INJECTION, SOLUTION INTRAVENOUS; SUBCUTANEOUS at 18:38

## 2021-01-01 RX ADMIN — MORPHINE SULFATE 2 MG: 2 INJECTION, SOLUTION INTRAMUSCULAR; INTRAVENOUS at 01:34

## 2021-01-01 RX ADMIN — INSULIN ASPART 3 UNITS: 100 INJECTION, SOLUTION INTRAVENOUS; SUBCUTANEOUS at 17:51

## 2021-01-01 RX ADMIN — LATANOPROST 1 DROP: 50 SOLUTION/ DROPS OPHTHALMIC at 22:35

## 2021-01-01 RX ADMIN — REMDESIVIR 100 MG: 100 INJECTION, POWDER, LYOPHILIZED, FOR SOLUTION INTRAVENOUS at 17:14

## 2021-01-01 RX ADMIN — PIPERACILLIN SODIUM AND TAZOBACTAM SODIUM 3.38 G: 3; .375 INJECTION, POWDER, LYOPHILIZED, FOR SOLUTION INTRAVENOUS at 23:52

## 2021-01-01 RX ADMIN — OXYCODONE HYDROCHLORIDE 5 MG: 5 TABLET ORAL at 22:32

## 2021-01-01 RX ADMIN — ENOXAPARIN SODIUM 40 MG: 40 INJECTION SUBCUTANEOUS at 19:45

## 2021-01-01 RX ADMIN — IPRATROPIUM BROMIDE AND ALBUTEROL 2 PUFF: 20; 100 SPRAY, METERED RESPIRATORY (INHALATION) at 12:43

## 2021-01-01 RX ADMIN — PIPERACILLIN AND TAZOBACTAM 3.38 G: 3; .375 INJECTION, POWDER, FOR SOLUTION INTRAVENOUS at 21:05

## 2021-01-01 RX ADMIN — PREDNISONE 5 MG: 5 TABLET ORAL at 08:45

## 2021-01-01 RX ADMIN — LATANOPROST 1 DROP: 50 SOLUTION/ DROPS OPHTHALMIC at 22:28

## 2021-01-01 RX ADMIN — DEXAMETHASONE SODIUM PHOSPHATE 20 MG: 4 INJECTION, SOLUTION INTRAMUSCULAR; INTRAVENOUS at 13:48

## 2021-01-01 RX ADMIN — HYDROMORPHONE HYDROCHLORIDE 0.2 MG: 0.2 INJECTION, SOLUTION INTRAMUSCULAR; INTRAVENOUS; SUBCUTANEOUS at 17:03

## 2021-01-01 RX ADMIN — POLYETHYLENE GLYCOL 3350 17 G: 17 POWDER, FOR SOLUTION ORAL at 08:48

## 2021-01-01 RX ADMIN — INSULIN ASPART 1 UNITS: 100 INJECTION, SOLUTION INTRAVENOUS; SUBCUTANEOUS at 08:40

## 2021-01-01 RX ADMIN — PIPERACILLIN SODIUM AND TAZOBACTAM SODIUM 3.38 G: 3; .375 INJECTION, POWDER, LYOPHILIZED, FOR SOLUTION INTRAVENOUS at 05:13

## 2021-01-01 RX ADMIN — DEXAMETHASONE SODIUM PHOSPHATE 2 MG: 10 INJECTION, SOLUTION INTRAMUSCULAR; INTRAVENOUS at 12:28

## 2021-01-01 RX ADMIN — INSULIN ASPART 2 UNITS: 100 INJECTION, SOLUTION INTRAVENOUS; SUBCUTANEOUS at 11:17

## 2021-01-01 RX ADMIN — ALLOPURINOL 300 MG: 300 TABLET ORAL at 08:38

## 2021-01-01 RX ADMIN — PIPERACILLIN AND TAZOBACTAM 3.38 G: 3; .375 INJECTION, POWDER, FOR SOLUTION INTRAVENOUS at 21:49

## 2021-01-01 RX ADMIN — ALLOPURINOL 300 MG: 300 TABLET ORAL at 09:13

## 2021-01-01 RX ADMIN — PIPERACILLIN AND TAZOBACTAM 3.38 G: 3; .375 INJECTION, POWDER, FOR SOLUTION INTRAVENOUS at 03:19

## 2021-01-01 RX ADMIN — SODIUM CHLORIDE, POTASSIUM CHLORIDE, SODIUM LACTATE AND CALCIUM CHLORIDE: 600; 310; 30; 20 INJECTION, SOLUTION INTRAVENOUS at 21:04

## 2021-01-01 RX ADMIN — PREDNISONE 5 MG: 5 TABLET ORAL at 21:00

## 2021-01-01 RX ADMIN — IPRATROPIUM BROMIDE AND ALBUTEROL 2 PUFF: 20; 100 SPRAY, METERED RESPIRATORY (INHALATION) at 14:22

## 2021-01-01 RX ADMIN — Medication 0.03 MCG/KG/MIN: at 07:50

## 2021-01-01 RX ADMIN — INSULIN ASPART 2 UNITS: 100 INJECTION, SOLUTION INTRAVENOUS; SUBCUTANEOUS at 08:29

## 2021-01-01 RX ADMIN — DOCUSATE SODIUM AND SENNOSIDES 1 TABLET: 8.6; 5 TABLET ORAL at 08:31

## 2021-01-01 RX ADMIN — SODIUM CHLORIDE, POTASSIUM CHLORIDE, SODIUM LACTATE AND CALCIUM CHLORIDE: 600; 310; 30; 20 INJECTION, SOLUTION INTRAVENOUS at 17:05

## 2021-01-01 RX ADMIN — PIPERACILLIN AND TAZOBACTAM 3.38 G: 3; .375 INJECTION, POWDER, FOR SOLUTION INTRAVENOUS at 09:30

## 2021-01-01 RX ADMIN — FAMOTIDINE 20 MG: 10 INJECTION, SOLUTION INTRAVENOUS at 08:38

## 2021-01-01 RX ADMIN — DEXAMETHASONE SODIUM PHOSPHATE 20 MG: 4 INJECTION, SOLUTION INTRAMUSCULAR; INTRAVENOUS at 08:31

## 2021-01-01 RX ADMIN — IPRATROPIUM BROMIDE AND ALBUTEROL 2 PUFF: 20; 100 SPRAY, METERED RESPIRATORY (INHALATION) at 21:14

## 2021-01-01 RX ADMIN — PIPERACILLIN SODIUM AND TAZOBACTAM SODIUM 3.38 G: 3; .375 INJECTION, POWDER, LYOPHILIZED, FOR SOLUTION INTRAVENOUS at 05:26

## 2021-01-01 RX ADMIN — CEFUROXIME AXETIL 500 MG: 500 TABLET ORAL at 09:05

## 2021-01-01 RX ADMIN — POLYETHYLENE GLYCOL 3350 17 G: 17 POWDER, FOR SOLUTION ORAL at 09:47

## 2021-01-01 RX ADMIN — ACETAMINOPHEN 650 MG: 325 TABLET, FILM COATED ORAL at 15:12

## 2021-01-01 RX ADMIN — MORPHINE SULFATE 4 MG: 4 INJECTION INTRAVENOUS at 12:47

## 2021-01-01 RX ADMIN — DEXTROSE MONOHYDRATE: 50 INJECTION, SOLUTION INTRAVENOUS at 17:01

## 2021-01-01 RX ADMIN — WATER 10 ML: 1 INJECTION INTRAMUSCULAR; INTRAVENOUS; SUBCUTANEOUS at 19:08

## 2021-01-01 RX ADMIN — FENTANYL CITRATE 50 MCG: 50 INJECTION, SOLUTION INTRAMUSCULAR; INTRAVENOUS at 16:41

## 2021-01-01 RX ADMIN — INSULIN ASPART 1 UNITS: 100 INJECTION, SOLUTION INTRAVENOUS; SUBCUTANEOUS at 08:26

## 2021-01-01 RX ADMIN — PREDNISONE 5 MG: 5 TABLET ORAL at 21:14

## 2021-01-01 RX ADMIN — SODIUM CHLORIDE 1000 ML: 9 INJECTION, SOLUTION INTRAVENOUS at 15:03

## 2021-01-01 RX ADMIN — PIPERACILLIN AND TAZOBACTAM 3.38 G: 3; .375 INJECTION, POWDER, FOR SOLUTION INTRAVENOUS at 20:35

## 2021-01-01 RX ADMIN — PIPERACILLIN AND TAZOBACTAM 3.38 G: 3; .375 INJECTION, POWDER, FOR SOLUTION INTRAVENOUS at 15:23

## 2021-01-01 RX ADMIN — ENOXAPARIN SODIUM 40 MG: 40 INJECTION SUBCUTANEOUS at 21:17

## 2021-01-01 RX ADMIN — SENNOSIDES AND DOCUSATE SODIUM 1 TABLET: 50; 8.6 TABLET ORAL at 08:39

## 2021-01-01 RX ADMIN — IPRATROPIUM BROMIDE AND ALBUTEROL 2 PUFF: 20; 100 SPRAY, METERED RESPIRATORY (INHALATION) at 18:26

## 2021-01-01 RX ADMIN — PIPERACILLIN AND TAZOBACTAM 3.38 G: 3; .375 INJECTION, POWDER, FOR SOLUTION INTRAVENOUS at 14:25

## 2021-01-01 RX ADMIN — ALLOPURINOL 300 MG: 300 TABLET ORAL at 08:27

## 2021-01-01 RX ADMIN — ACETAMINOPHEN 975 MG: 325 TABLET, FILM COATED ORAL at 08:30

## 2021-01-01 RX ADMIN — ACETAMINOPHEN 650 MG: 650 SUPPOSITORY RECTAL at 16:49

## 2021-01-01 RX ADMIN — PIPERACILLIN AND TAZOBACTAM 3.38 G: 3; .375 INJECTION, POWDER, FOR SOLUTION INTRAVENOUS at 09:27

## 2021-01-01 RX ADMIN — PIPERACILLIN AND TAZOBACTAM 3.38 G: 3; .375 INJECTION, POWDER, FOR SOLUTION INTRAVENOUS at 15:07

## 2021-01-01 RX ADMIN — IOPAMIDOL 64 ML: 755 INJECTION, SOLUTION INTRAVENOUS at 17:09

## 2021-01-01 RX ADMIN — INSULIN ASPART 1 UNITS: 100 INJECTION, SOLUTION INTRAVENOUS; SUBCUTANEOUS at 17:27

## 2021-01-01 RX ADMIN — OXYCODONE HYDROCHLORIDE 5 MG: 5 TABLET ORAL at 05:53

## 2021-01-01 RX ADMIN — DEXMEDETOMIDINE HYDROCHLORIDE 0.2 MCG/KG/HR: 400 INJECTION INTRAVENOUS at 05:19

## 2021-01-01 RX ADMIN — PREDNISONE 5 MG: 5 TABLET ORAL at 22:29

## 2021-01-01 RX ADMIN — DOCUSATE SODIUM AND SENNOSIDES 1 TABLET: 8.6; 5 TABLET ORAL at 08:30

## 2021-01-01 RX ADMIN — HALOPERIDOL LACTATE 2 MG: 5 INJECTION, SOLUTION INTRAMUSCULAR at 01:10

## 2021-01-01 RX ADMIN — CEFTRIAXONE SODIUM 2 G: 2 INJECTION, POWDER, FOR SOLUTION INTRAMUSCULAR; INTRAVENOUS at 12:16

## 2021-01-01 RX ADMIN — FONDAPARINUX SODIUM 7.5 MG: 7.5 INJECTION, SOLUTION SUBCUTANEOUS at 21:16

## 2021-01-01 RX ADMIN — ENOXAPARIN SODIUM 40 MG: 40 INJECTION SUBCUTANEOUS at 21:22

## 2021-01-01 RX ADMIN — PIPERACILLIN AND TAZOBACTAM 3.38 G: 3; .375 INJECTION, POWDER, FOR SOLUTION INTRAVENOUS at 09:25

## 2021-01-01 RX ADMIN — INSULIN ASPART 1 UNITS: 100 INJECTION, SOLUTION INTRAVENOUS; SUBCUTANEOUS at 11:06

## 2021-01-01 RX ADMIN — DEXAMETHASONE SODIUM PHOSPHATE 10 MG: 10 INJECTION INTRAMUSCULAR; INTRAVENOUS at 09:24

## 2021-01-01 RX ADMIN — FAMOTIDINE 20 MG: 10 INJECTION, SOLUTION INTRAVENOUS at 08:31

## 2021-01-01 RX ADMIN — PIPERACILLIN SODIUM AND TAZOBACTAM SODIUM 3.38 G: 3; .375 INJECTION, POWDER, LYOPHILIZED, FOR SOLUTION INTRAVENOUS at 23:42

## 2021-01-01 RX ADMIN — PIPERACILLIN SODIUM AND TAZOBACTAM SODIUM 3.38 G: 3; .375 INJECTION, POWDER, LYOPHILIZED, FOR SOLUTION INTRAVENOUS at 12:45

## 2021-01-01 RX ADMIN — TRANEXAMIC ACID 1950 MG: 650 TABLET ORAL at 10:58

## 2021-01-01 RX ADMIN — INSULIN GLARGINE 8 UNITS: 100 INJECTION, SOLUTION SUBCUTANEOUS at 21:37

## 2021-01-01 RX ADMIN — HALOPERIDOL LACTATE 2 MG: 5 INJECTION, SOLUTION INTRAMUSCULAR at 13:40

## 2021-01-01 RX ADMIN — MORPHINE SULFATE 2 MG: 2 INJECTION, SOLUTION INTRAMUSCULAR; INTRAVENOUS at 04:41

## 2021-01-01 RX ADMIN — INSULIN ASPART 1 UNITS: 100 INJECTION, SOLUTION INTRAVENOUS; SUBCUTANEOUS at 20:14

## 2021-01-01 RX ADMIN — MORPHINE SULFATE 2 MG: 2 INJECTION, SOLUTION INTRAMUSCULAR; INTRAVENOUS at 18:30

## 2021-01-01 RX ADMIN — QUETIAPINE FUMARATE 50 MG: 25 TABLET ORAL at 21:49

## 2021-01-01 RX ADMIN — SENNOSIDES AND DOCUSATE SODIUM 1 TABLET: 50; 8.6 TABLET ORAL at 08:38

## 2021-01-01 RX ADMIN — DOCUSATE SODIUM AND SENNOSIDES 1 TABLET: 8.6; 5 TABLET ORAL at 19:57

## 2021-01-01 RX ADMIN — PANTOPRAZOLE SODIUM 40 MG: 40 TABLET, DELAYED RELEASE ORAL at 16:41

## 2021-01-01 RX ADMIN — ACETAMINOPHEN 650 MG: 325 TABLET, FILM COATED ORAL at 08:48

## 2021-01-01 RX ADMIN — ACETAMINOPHEN 975 MG: 325 TABLET, FILM COATED ORAL at 20:55

## 2021-01-01 RX ADMIN — SODIUM CHLORIDE, POTASSIUM CHLORIDE, SODIUM LACTATE AND CALCIUM CHLORIDE: 600; 310; 30; 20 INJECTION, SOLUTION INTRAVENOUS at 05:52

## 2021-01-01 RX ADMIN — LATANOPROST 1 DROP: 50 SOLUTION/ DROPS OPHTHALMIC at 22:54

## 2021-01-01 RX ADMIN — PIPERACILLIN AND TAZOBACTAM 4.5 G: 4; .5 INJECTION, POWDER, FOR SOLUTION INTRAVENOUS at 16:49

## 2021-01-01 RX ADMIN — IPRATROPIUM BROMIDE AND ALBUTEROL 2 PUFF: 20; 100 SPRAY, METERED RESPIRATORY (INHALATION) at 09:14

## 2021-01-01 RX ADMIN — SODIUM CHLORIDE, PRESERVATIVE FREE 50 ML: 5 INJECTION INTRAVENOUS at 16:33

## 2021-01-01 RX ADMIN — ENOXAPARIN SODIUM 40 MG: 40 INJECTION SUBCUTANEOUS at 21:49

## 2021-01-01 RX ADMIN — INSULIN ASPART 1 UNITS: 100 INJECTION, SOLUTION INTRAVENOUS; SUBCUTANEOUS at 22:01

## 2021-01-01 RX ADMIN — INSULIN ASPART 2 UNITS: 100 INJECTION, SOLUTION INTRAVENOUS; SUBCUTANEOUS at 13:29

## 2021-01-01 RX ADMIN — SODIUM CHLORIDE, POTASSIUM CHLORIDE, SODIUM LACTATE AND CALCIUM CHLORIDE: 600; 310; 30; 20 INJECTION, SOLUTION INTRAVENOUS at 15:57

## 2021-01-01 RX ADMIN — PANTOPRAZOLE SODIUM 40 MG: 40 TABLET, DELAYED RELEASE ORAL at 18:18

## 2021-01-01 RX ADMIN — PIPERACILLIN AND TAZOBACTAM 3.38 G: 3; .375 INJECTION, POWDER, FOR SOLUTION INTRAVENOUS at 16:36

## 2021-01-01 RX ADMIN — DOCUSATE SODIUM AND SENNOSIDES 1 TABLET: 8.6; 5 TABLET ORAL at 21:18

## 2021-01-01 RX ADMIN — PIPERACILLIN SODIUM AND TAZOBACTAM SODIUM 3.38 G: 3; .375 INJECTION, POWDER, LYOPHILIZED, FOR SOLUTION INTRAVENOUS at 00:16

## 2021-01-01 RX ADMIN — ACETAMINOPHEN 975 MG: 325 TABLET, FILM COATED ORAL at 19:56

## 2021-01-01 RX ADMIN — PREDNISONE 5 MG: 5 TABLET ORAL at 09:04

## 2021-01-01 RX ADMIN — ENOXAPARIN SODIUM 40 MG: 40 INJECTION SUBCUTANEOUS at 20:34

## 2021-01-01 RX ADMIN — Medication 20 MCG: at 12:28

## 2021-01-01 RX ADMIN — PIPERACILLIN AND TAZOBACTAM 3.38 G: 3; .375 INJECTION, POWDER, FOR SOLUTION INTRAVENOUS at 01:55

## 2021-01-01 RX ADMIN — INSULIN ASPART 2 UNITS: 100 INJECTION, SOLUTION INTRAVENOUS; SUBCUTANEOUS at 12:21

## 2021-01-01 RX ADMIN — INSULIN GLARGINE 8 UNITS: 100 INJECTION, SOLUTION SUBCUTANEOUS at 22:26

## 2021-01-01 RX ADMIN — PIPERACILLIN AND TAZOBACTAM 3.38 G: 3; .375 INJECTION, POWDER, FOR SOLUTION INTRAVENOUS at 10:31

## 2021-01-01 RX ADMIN — ALLOPURINOL 300 MG: 300 TABLET ORAL at 11:52

## 2021-01-01 RX ADMIN — DEXAMETHASONE SODIUM PHOSPHATE 6 MG: 4 INJECTION, SOLUTION INTRAMUSCULAR; INTRAVENOUS at 14:24

## 2021-01-01 RX ADMIN — EMPAGLIFLOZIN 10 MG: 10 TABLET, FILM COATED ORAL at 09:18

## 2021-01-01 RX ADMIN — HALOPERIDOL LACTATE 2 MG: 5 INJECTION, SOLUTION INTRAMUSCULAR at 03:19

## 2021-01-01 RX ADMIN — FENTANYL CITRATE 50 MCG: 50 INJECTION, SOLUTION INTRAMUSCULAR; INTRAVENOUS at 09:58

## 2021-01-01 RX ADMIN — FENTANYL CITRATE 50 MCG: 50 INJECTION INTRAMUSCULAR; INTRAVENOUS at 12:26

## 2021-01-01 RX ADMIN — PIPERACILLIN SODIUM AND TAZOBACTAM SODIUM 3.38 G: 3; .375 INJECTION, POWDER, LYOPHILIZED, FOR SOLUTION INTRAVENOUS at 06:11

## 2021-01-01 RX ADMIN — ACETAMINOPHEN 975 MG: 325 TABLET, FILM COATED ORAL at 02:56

## 2021-01-01 RX ADMIN — HALOPERIDOL LACTATE 2 MG: 5 INJECTION, SOLUTION INTRAMUSCULAR at 01:25

## 2021-01-01 RX ADMIN — SODIUM CHLORIDE, PRESERVATIVE FREE 50 ML: 5 INJECTION INTRAVENOUS at 17:54

## 2021-01-01 RX ADMIN — BUPIVACAINE HYDROCHLORIDE IN DEXTROSE 1.6 ML: 7.5 INJECTION, SOLUTION SUBARACHNOID at 21:05

## 2021-01-01 RX ADMIN — IPRATROPIUM BROMIDE AND ALBUTEROL 2 PUFF: 20; 100 SPRAY, METERED RESPIRATORY (INHALATION) at 08:20

## 2021-01-01 RX ADMIN — IPRATROPIUM BROMIDE AND ALBUTEROL 2 PUFF: 20; 100 SPRAY, METERED RESPIRATORY (INHALATION) at 15:08

## 2021-01-01 RX ADMIN — SODIUM PHOSPHATE, MONOBASIC, MONOHYDRATE 15 MMOL: 276; 142 INJECTION, SOLUTION INTRAVENOUS at 08:54

## 2021-01-01 RX ADMIN — INSULIN ASPART 2 UNITS: 100 INJECTION, SOLUTION INTRAVENOUS; SUBCUTANEOUS at 00:49

## 2021-01-01 RX ADMIN — SODIUM PHOSPHATE, MONOBASIC, MONOHYDRATE 15 MMOL: 276; 142 INJECTION, SOLUTION INTRAVENOUS at 13:57

## 2021-01-01 RX ADMIN — INSULIN GLARGINE 8 UNITS: 100 INJECTION, SOLUTION SUBCUTANEOUS at 22:37

## 2021-01-01 RX ADMIN — LATANOPROST 1 DROP: 50 SOLUTION/ DROPS OPHTHALMIC at 21:53

## 2021-01-01 RX ADMIN — CEFAZOLIN 1 G: 1 INJECTION, POWDER, FOR SOLUTION INTRAMUSCULAR; INTRAVENOUS at 00:44

## 2021-01-01 RX ADMIN — LATANOPROST 1 DROP: 50 SOLUTION/ DROPS OPHTHALMIC at 21:21

## 2021-01-01 RX ADMIN — IPRATROPIUM BROMIDE AND ALBUTEROL 2 PUFF: 20; 100 SPRAY, METERED RESPIRATORY (INHALATION) at 22:31

## 2021-01-01 RX ADMIN — OXYCODONE HYDROCHLORIDE 5 MG: 5 TABLET ORAL at 20:55

## 2021-01-01 RX ADMIN — PREDNISONE 5 MG: 5 TABLET ORAL at 19:57

## 2021-01-01 RX ADMIN — ACETAMINOPHEN 650 MG: 325 TABLET, FILM COATED ORAL at 15:24

## 2021-01-01 RX ADMIN — Medication 1 MG: at 02:18

## 2021-01-01 RX ADMIN — INSULIN ASPART 2 UNITS: 100 INJECTION, SOLUTION INTRAVENOUS; SUBCUTANEOUS at 08:09

## 2021-01-01 RX ADMIN — IPRATROPIUM BROMIDE AND ALBUTEROL 2 PUFF: 20; 100 SPRAY, METERED RESPIRATORY (INHALATION) at 18:16

## 2021-01-01 RX ADMIN — PREDNISONE 5 MG: 5 TABLET ORAL at 08:31

## 2021-01-01 RX ADMIN — POLYETHYLENE GLYCOL 3350 17 G: 17 POWDER, FOR SOLUTION ORAL at 08:30

## 2021-01-01 RX ADMIN — OXYCODONE HYDROCHLORIDE 5 MG: 5 TABLET ORAL at 15:51

## 2021-01-01 RX ADMIN — ENOXAPARIN SODIUM 40 MG: 40 INJECTION SUBCUTANEOUS at 20:12

## 2021-01-01 RX ADMIN — SENNOSIDES AND DOCUSATE SODIUM 1 TABLET: 50; 8.6 TABLET ORAL at 21:49

## 2021-01-01 RX ADMIN — INSULIN ASPART 2 UNITS: 100 INJECTION, SOLUTION INTRAVENOUS; SUBCUTANEOUS at 12:20

## 2021-01-01 RX ADMIN — ACETAMINOPHEN 975 MG: 325 TABLET, FILM COATED ORAL at 21:18

## 2021-01-01 RX ADMIN — SODIUM CHLORIDE 89 ML: 9 INJECTION, SOLUTION INTRAVENOUS at 17:09

## 2021-01-01 RX ADMIN — ENOXAPARIN SODIUM 40 MG: 40 INJECTION SUBCUTANEOUS at 12:20

## 2021-01-01 RX ADMIN — ACETAMINOPHEN 650 MG: 325 TABLET, FILM COATED ORAL at 05:13

## 2021-01-01 RX ADMIN — ENOXAPARIN SODIUM 40 MG: 40 INJECTION SUBCUTANEOUS at 21:29

## 2021-01-01 RX ADMIN — LORAZEPAM 1 MG: 2 INJECTION INTRAMUSCULAR; INTRAVENOUS at 01:20

## 2021-01-01 RX ADMIN — PANTOPRAZOLE SODIUM 80 MG: 40 INJECTION, POWDER, FOR SOLUTION INTRAVENOUS at 04:57

## 2021-01-01 RX ADMIN — FAMOTIDINE 20 MG: 10 INJECTION, SOLUTION INTRAVENOUS at 21:14

## 2021-01-01 RX ADMIN — INSULIN ASPART 1 UNITS: 100 INJECTION, SOLUTION INTRAVENOUS; SUBCUTANEOUS at 11:43

## 2021-01-01 RX ADMIN — ACETAMINOPHEN 975 MG: 325 TABLET, FILM COATED ORAL at 02:53

## 2021-01-01 RX ADMIN — POLYETHYLENE GLYCOL 3350 17 G: 17 POWDER, FOR SOLUTION ORAL at 08:38

## 2021-01-01 RX ADMIN — PIPERACILLIN AND TAZOBACTAM 3.38 G: 3; .375 INJECTION, POWDER, FOR SOLUTION INTRAVENOUS at 15:55

## 2021-01-01 RX ADMIN — OXYCODONE HYDROCHLORIDE 5 MG: 5 TABLET ORAL at 01:42

## 2021-01-01 RX ADMIN — INSULIN ASPART 3 UNITS: 100 INJECTION, SOLUTION INTRAVENOUS; SUBCUTANEOUS at 18:11

## 2021-01-01 RX ADMIN — IPRATROPIUM BROMIDE AND ALBUTEROL 2 PUFF: 20; 100 SPRAY, METERED RESPIRATORY (INHALATION) at 21:54

## 2021-01-01 RX ADMIN — REMDESIVIR 200 MG: 100 INJECTION, POWDER, LYOPHILIZED, FOR SOLUTION INTRAVENOUS at 21:15

## 2021-01-01 RX ADMIN — ACETAMINOPHEN 650 MG: 325 TABLET, FILM COATED ORAL at 16:30

## 2021-01-01 RX ADMIN — INSULIN ASPART 2 UNITS: 100 INJECTION, SOLUTION INTRAVENOUS; SUBCUTANEOUS at 09:07

## 2021-01-01 RX ADMIN — PREDNISONE 5 MG: 5 TABLET ORAL at 12:30

## 2021-01-01 RX ADMIN — HALOPERIDOL LACTATE 2.5 MG: 5 INJECTION, SOLUTION INTRAMUSCULAR at 22:50

## 2021-01-01 RX ADMIN — PIPERACILLIN AND TAZOBACTAM 3.38 G: 3; .375 INJECTION, POWDER, FOR SOLUTION INTRAVENOUS at 09:41

## 2021-01-01 RX ADMIN — LATANOPROST 1 DROP: 50 SOLUTION/ DROPS OPHTHALMIC at 21:07

## 2021-01-01 RX ADMIN — HALOPERIDOL LACTATE 2 MG: 5 INJECTION, SOLUTION INTRAMUSCULAR at 03:40

## 2021-01-01 RX ADMIN — EMPAGLIFLOZIN 10 MG: 10 TABLET, FILM COATED ORAL at 11:07

## 2021-01-01 RX ADMIN — OLANZAPINE 5 MG: 10 INJECTION, POWDER, FOR SOLUTION INTRAMUSCULAR at 19:07

## 2021-01-01 RX ADMIN — PIPERACILLIN AND TAZOBACTAM 3.38 G: 3; .375 INJECTION, POWDER, FOR SOLUTION INTRAVENOUS at 05:19

## 2021-01-01 RX ADMIN — PREDNISONE 5 MG: 5 TABLET ORAL at 08:27

## 2021-01-01 RX ADMIN — INSULIN ASPART 2 UNITS: 100 INJECTION, SOLUTION INTRAVENOUS; SUBCUTANEOUS at 18:14

## 2021-01-01 RX ADMIN — ENOXAPARIN SODIUM 40 MG: 40 INJECTION SUBCUTANEOUS at 21:00

## 2021-01-01 RX ADMIN — HALOPERIDOL LACTATE 2 MG: 5 INJECTION, SOLUTION INTRAMUSCULAR at 16:28

## 2021-01-01 RX ADMIN — PANTOPRAZOLE SODIUM 40 MG: 40 INJECTION, POWDER, FOR SOLUTION INTRAVENOUS at 08:18

## 2021-01-01 RX ADMIN — SODIUM CHLORIDE, PRESERVATIVE FREE 50 ML: 5 INJECTION INTRAVENOUS at 21:25

## 2021-01-01 RX ADMIN — IPRATROPIUM BROMIDE AND ALBUTEROL 2 PUFF: 20; 100 SPRAY, METERED RESPIRATORY (INHALATION) at 09:45

## 2021-01-01 RX ADMIN — PIPERACILLIN AND TAZOBACTAM 3.38 G: 3; .375 INJECTION, POWDER, FOR SOLUTION INTRAVENOUS at 04:16

## 2021-01-01 RX ADMIN — OXYCODONE HYDROCHLORIDE 5 MG: 5 TABLET ORAL at 16:33

## 2021-01-01 RX ADMIN — LATANOPROST 1 DROP: 50 SOLUTION/ DROPS OPHTHALMIC at 22:30

## 2021-01-01 RX ADMIN — SENNOSIDES AND DOCUSATE SODIUM 1 TABLET: 50; 8.6 TABLET ORAL at 21:20

## 2021-01-01 RX ADMIN — PIPERACILLIN SODIUM AND TAZOBACTAM SODIUM 3.38 G: 3; .375 INJECTION, POWDER, LYOPHILIZED, FOR SOLUTION INTRAVENOUS at 10:26

## 2021-01-01 RX ADMIN — INSULIN ASPART 3 UNITS: 100 INJECTION, SOLUTION INTRAVENOUS; SUBCUTANEOUS at 16:49

## 2021-01-01 RX ADMIN — DEXAMETHASONE SODIUM PHOSPHATE 10 MG: 10 INJECTION INTRAMUSCULAR; INTRAVENOUS at 08:55

## 2021-01-01 RX ADMIN — Medication 1 MG: at 23:53

## 2021-01-01 RX ADMIN — INSULIN ASPART 1 UNITS: 100 INJECTION, SOLUTION INTRAVENOUS; SUBCUTANEOUS at 16:38

## 2021-01-01 RX ADMIN — SODIUM CHLORIDE 1000 ML: 9 INJECTION, SOLUTION INTRAVENOUS at 12:24

## 2021-01-01 RX ADMIN — ALLOPURINOL 300 MG: 300 TABLET ORAL at 08:45

## 2021-01-01 RX ADMIN — INSULIN ASPART 1 UNITS: 100 INJECTION, SOLUTION INTRAVENOUS; SUBCUTANEOUS at 08:37

## 2021-01-01 RX ADMIN — PIPERACILLIN SODIUM AND TAZOBACTAM SODIUM 3.38 G: 3; .375 INJECTION, POWDER, LYOPHILIZED, FOR SOLUTION INTRAVENOUS at 18:38

## 2021-01-01 RX ADMIN — INSULIN ASPART 1 UNITS: 100 INJECTION, SOLUTION INTRAVENOUS; SUBCUTANEOUS at 08:48

## 2021-01-01 ASSESSMENT — ACTIVITIES OF DAILY LIVING (ADL)
ADLS_ACUITY_SCORE: 29
ADLS_ACUITY_SCORE: 23
ADLS_ACUITY_SCORE: 29
ADLS_ACUITY_SCORE: 23
ADLS_ACUITY_SCORE: 22
ADLS_ACUITY_SCORE: 23
ADLS_ACUITY_SCORE: 23
ADLS_ACUITY_SCORE: 20
ADLS_ACUITY_SCORE: 23
ADLS_ACUITY_SCORE: 23
ADLS_ACUITY_SCORE: 27
ADLS_ACUITY_SCORE: 18
ADLS_ACUITY_SCORE: 23
ADLS_ACUITY_SCORE: 27
ADLS_ACUITY_SCORE: 16
ADLS_ACUITY_SCORE: 23
ADLS_ACUITY_SCORE: 29
ADLS_ACUITY_SCORE: 16
ADLS_ACUITY_SCORE: 27
ADLS_ACUITY_SCORE: 10
ADLS_ACUITY_SCORE: 11
ADLS_ACUITY_SCORE: 29
ADLS_ACUITY_SCORE: 27
ADLS_ACUITY_SCORE: 23
ADLS_ACUITY_SCORE: 18
ADLS_ACUITY_SCORE: 25
ADLS_ACUITY_SCORE: 11
ADLS_ACUITY_SCORE: 25
ADLS_ACUITY_SCORE: 27
ADLS_ACUITY_SCORE: 16
ADLS_ACUITY_SCORE: 29
ADLS_ACUITY_SCORE: 23
ADLS_ACUITY_SCORE: 14
ADLS_ACUITY_SCORE: 20
ADLS_ACUITY_SCORE: 23
ADLS_ACUITY_SCORE: 14
ADLS_ACUITY_SCORE: 18
ADLS_ACUITY_SCORE: 27
CURRENT_FUNCTION: TRANSPORTATION REQUIRES ASSISTANCE
ADLS_ACUITY_SCORE: 10
ADLS_ACUITY_SCORE: 19
ADLS_ACUITY_SCORE: 27
ADLS_ACUITY_SCORE: 22
ADLS_ACUITY_SCORE: 23
ADLS_ACUITY_SCORE: 14
ADLS_ACUITY_SCORE: 29
ADLS_ACUITY_SCORE: 27
ADLS_ACUITY_SCORE: 19
ADLS_ACUITY_SCORE: 23
ADLS_ACUITY_SCORE: 16
ADLS_ACUITY_SCORE: 23
ADLS_ACUITY_SCORE: 12
CURRENT_FUNCTION: PREPARING MEALS REQUIRES ASSISTANCE
ADLS_ACUITY_SCORE: 29
ADLS_ACUITY_SCORE: 29
ADLS_ACUITY_SCORE: 12
ADLS_ACUITY_SCORE: 29
ADLS_ACUITY_SCORE: 25
ADLS_ACUITY_SCORE: 23
ADLS_ACUITY_SCORE: 24
ADLS_ACUITY_SCORE: 29
ADLS_ACUITY_SCORE: 23
ADLS_ACUITY_SCORE: 29
ADLS_ACUITY_SCORE: 20
ADLS_ACUITY_SCORE: 29
ADLS_ACUITY_SCORE: 25
ADLS_ACUITY_SCORE: 19
ADLS_ACUITY_SCORE: 23
ADLS_ACUITY_SCORE: 29
ADLS_ACUITY_SCORE: 12
ADLS_ACUITY_SCORE: 20
ADLS_ACUITY_SCORE: 23
ADLS_ACUITY_SCORE: 27
ADLS_ACUITY_SCORE: 29
ADLS_ACUITY_SCORE: 23
ADLS_ACUITY_SCORE: 14
ADLS_ACUITY_SCORE: 20
ADLS_ACUITY_SCORE: 29
ADLS_ACUITY_SCORE: 29
ADLS_ACUITY_SCORE: 14
ADLS_ACUITY_SCORE: 12
ADLS_ACUITY_SCORE: 27
ADLS_ACUITY_SCORE: 29
ADLS_ACUITY_SCORE: 27
ADLS_ACUITY_SCORE: 20
ADLS_ACUITY_SCORE: 27
ADLS_ACUITY_SCORE: 14
ADLS_ACUITY_SCORE: 19
ADLS_ACUITY_SCORE: 29
ADLS_ACUITY_SCORE: 23
ADLS_ACUITY_SCORE: 23
ADLS_ACUITY_SCORE: 27
ADLS_ACUITY_SCORE: 23
ADLS_ACUITY_SCORE: 23
ADLS_ACUITY_SCORE: 24
ADLS_ACUITY_SCORE: 10
ADLS_ACUITY_SCORE: 16
ADLS_ACUITY_SCORE: 29
ADLS_ACUITY_SCORE: 23
ADLS_ACUITY_SCORE: 24
ADLS_ACUITY_SCORE: 22
ADLS_ACUITY_SCORE: 22
ADLS_ACUITY_SCORE: 25
ADLS_ACUITY_SCORE: 24
ADLS_ACUITY_SCORE: 29
ADLS_ACUITY_SCORE: 27
ADLS_ACUITY_SCORE: 24
ADLS_ACUITY_SCORE: 29
ADLS_ACUITY_SCORE: 29
ADLS_ACUITY_SCORE: 11
ADLS_ACUITY_SCORE: 20
ADLS_ACUITY_SCORE: 23
ADLS_ACUITY_SCORE: 16
ADLS_ACUITY_SCORE: 16
ADLS_ACUITY_SCORE: 11
ADLS_ACUITY_SCORE: 20
ADLS_ACUITY_SCORE: 16
ADLS_ACUITY_SCORE: 23
ADLS_ACUITY_SCORE: 23
ADLS_ACUITY_SCORE: 27
ADLS_ACUITY_SCORE: 27
ADLS_ACUITY_SCORE: 11
ADLS_ACUITY_SCORE: 27
ADLS_ACUITY_SCORE: 29
ADLS_ACUITY_SCORE: 29
ADLS_ACUITY_SCORE: 23
ADLS_ACUITY_SCORE: 29
ADLS_ACUITY_SCORE: 12
ADLS_ACUITY_SCORE: 29
ADLS_ACUITY_SCORE: 24
ADLS_ACUITY_SCORE: 27
ADLS_ACUITY_SCORE: 20
ADLS_ACUITY_SCORE: 27
ADLS_ACUITY_SCORE: 23
ADLS_ACUITY_SCORE: 16
ADLS_ACUITY_SCORE: 23
ADLS_ACUITY_SCORE: 10
ADLS_ACUITY_SCORE: 16
ADLS_ACUITY_SCORE: 20
ADLS_ACUITY_SCORE: 29
ADLS_ACUITY_SCORE: 26
ADLS_ACUITY_SCORE: 14
ADLS_ACUITY_SCORE: 20
ADLS_ACUITY_SCORE: 16
ADLS_ACUITY_SCORE: 25
ADLS_ACUITY_SCORE: 29
ADLS_ACUITY_SCORE: 12
ADLS_ACUITY_SCORE: 27
ADLS_ACUITY_SCORE: 25
ADLS_ACUITY_SCORE: 29
ADLS_ACUITY_SCORE: 22
ADLS_ACUITY_SCORE: 27
ADLS_ACUITY_SCORE: 23
ADLS_ACUITY_SCORE: 25
ADLS_ACUITY_SCORE: 27
ADLS_ACUITY_SCORE: 27
ADLS_ACUITY_SCORE: 23
ADLS_ACUITY_SCORE: 27
ADLS_ACUITY_SCORE: 20
ADLS_ACUITY_SCORE: 27
ADLS_ACUITY_SCORE: 18
ADLS_ACUITY_SCORE: 16
ADLS_ACUITY_SCORE: 23
ADLS_ACUITY_SCORE: 23
ADLS_ACUITY_SCORE: 25
ADLS_ACUITY_SCORE: 29
ADLS_ACUITY_SCORE: 29
ADLS_ACUITY_SCORE: 16
ADLS_ACUITY_SCORE: 16
CURRENT_FUNCTION: LAUNDRY REQUIRES ASSISTANCE
ADLS_ACUITY_SCORE: 29
ADLS_ACUITY_SCORE: 29
ADLS_ACUITY_SCORE: 27
ADLS_ACUITY_SCORE: 29
ADLS_ACUITY_SCORE: 29
ADLS_ACUITY_SCORE: 10
ADLS_ACUITY_SCORE: 29
ADLS_ACUITY_SCORE: 29
ADLS_ACUITY_SCORE: 27
ADLS_ACUITY_SCORE: 10
CURRENT_FUNCTION: MEDICATION ADMINISTRATION REQUIRES ASSISTANCE
ADLS_ACUITY_SCORE: 23
ADLS_ACUITY_SCORE: 29
ADLS_ACUITY_SCORE: 16
ADLS_ACUITY_SCORE: 20
ADLS_ACUITY_SCORE: 29
ADLS_ACUITY_SCORE: 25
ADLS_ACUITY_SCORE: 23
ADLS_ACUITY_SCORE: 29
ADLS_ACUITY_SCORE: 22
ADLS_ACUITY_SCORE: 27
ADLS_ACUITY_SCORE: 14
ADLS_ACUITY_SCORE: 29
ADLS_ACUITY_SCORE: 29
ADLS_ACUITY_SCORE: 27
ADLS_ACUITY_SCORE: 29
ADLS_ACUITY_SCORE: 29
ADLS_ACUITY_SCORE: 23
ADLS_ACUITY_SCORE: 29
ADLS_ACUITY_SCORE: 18
ADLS_ACUITY_SCORE: 29
ADLS_ACUITY_SCORE: 18
ADLS_ACUITY_SCORE: 16
ADLS_ACUITY_SCORE: 29
ADLS_ACUITY_SCORE: 16
ADLS_ACUITY_SCORE: 11
ADLS_ACUITY_SCORE: 27
ADLS_ACUITY_SCORE: 20
ADLS_ACUITY_SCORE: 20
CURRENT_FUNCTION: TELEPHONE REQUIRES ASSISTANCE
ADLS_ACUITY_SCORE: 23
ADLS_ACUITY_SCORE: 26
ADLS_ACUITY_SCORE: 29
ADLS_ACUITY_SCORE: 16
ADLS_ACUITY_SCORE: 29
ADLS_ACUITY_SCORE: 29
ADLS_ACUITY_SCORE: 25
ADLS_ACUITY_SCORE: 23
ADLS_ACUITY_SCORE: 29
ADLS_ACUITY_SCORE: 27
ADLS_ACUITY_SCORE: 16
ADLS_ACUITY_SCORE: 27
ADLS_ACUITY_SCORE: 25
ADLS_ACUITY_SCORE: 19
ADLS_ACUITY_SCORE: 14
ADLS_ACUITY_SCORE: 26
ADLS_ACUITY_SCORE: 12
ADLS_ACUITY_SCORE: 20
ADLS_ACUITY_SCORE: 23
ADLS_ACUITY_SCORE: 27
ADLS_ACUITY_SCORE: 11
ADLS_ACUITY_SCORE: 18
ADLS_ACUITY_SCORE: 23
ADLS_ACUITY_SCORE: 27
ADLS_ACUITY_SCORE: 29
ADLS_ACUITY_SCORE: 23
ADLS_ACUITY_SCORE: 23
ADLS_ACUITY_SCORE: 27
ADLS_ACUITY_SCORE: 29
ADLS_ACUITY_SCORE: 27
ADLS_ACUITY_SCORE: 29
ADLS_ACUITY_SCORE: 24
ADLS_ACUITY_SCORE: 29
ADLS_ACUITY_SCORE: 16
ADLS_ACUITY_SCORE: 24
ADLS_ACUITY_SCORE: 29
ADLS_ACUITY_SCORE: 14
ADLS_ACUITY_SCORE: 23
ADLS_ACUITY_SCORE: 14
ADLS_ACUITY_SCORE: 29
ADLS_ACUITY_SCORE: 11
ADLS_ACUITY_SCORE: 25
ADLS_ACUITY_SCORE: 27
ADLS_ACUITY_SCORE: 16
ADLS_ACUITY_SCORE: 27
IADL_COMMENTS: FAMILY ASSISTS
ADLS_ACUITY_SCORE: 20
ADLS_ACUITY_SCORE: 16
ADLS_ACUITY_SCORE: 29
ADLS_ACUITY_SCORE: 27
ADLS_ACUITY_SCORE: 23
ADLS_ACUITY_SCORE: 29
ADLS_ACUITY_SCORE: 23
ADLS_ACUITY_SCORE: 18
ADLS_ACUITY_SCORE: 27
ADLS_ACUITY_SCORE: 12
ADLS_ACUITY_SCORE: 18
ADLS_ACUITY_SCORE: 10
ADLS_ACUITY_SCORE: 27
ADLS_ACUITY_SCORE: 16
ADLS_ACUITY_SCORE: 23
ADLS_ACUITY_SCORE: 23
ADLS_ACUITY_SCORE: 16
ADLS_ACUITY_SCORE: 24
ADLS_ACUITY_SCORE: 24
ADLS_ACUITY_SCORE: 20
ADLS_ACUITY_SCORE: 22
ADLS_ACUITY_SCORE: 11
ADLS_ACUITY_SCORE: 27
DEPENDENT_IADLS:: INDEPENDENT
ADLS_ACUITY_SCORE: 29
ADLS_ACUITY_SCORE: 29
ADLS_ACUITY_SCORE: 27
ADLS_ACUITY_SCORE: 19
ADLS_ACUITY_SCORE: 12
ADLS_ACUITY_SCORE: 23
ADLS_ACUITY_SCORE: 27
ADLS_ACUITY_SCORE: 24
ADLS_ACUITY_SCORE: 27
ADLS_ACUITY_SCORE: 29
ADLS_ACUITY_SCORE: 19
ADLS_ACUITY_SCORE: 25
ADLS_ACUITY_SCORE: 23
ADLS_ACUITY_SCORE: 24
ADLS_ACUITY_SCORE: 20
ADLS_ACUITY_SCORE: 29
ADLS_ACUITY_SCORE: 10
ADLS_ACUITY_SCORE: 27
ADLS_ACUITY_SCORE: 29
ADLS_ACUITY_SCORE: 29
ADLS_ACUITY_SCORE: 16
ADLS_ACUITY_SCORE: 29
ADLS_ACUITY_SCORE: 16
ADLS_ACUITY_SCORE: 19
ADLS_ACUITY_SCORE: 16
ADLS_ACUITY_SCORE: 31
ADLS_ACUITY_SCORE: 12
ADLS_ACUITY_SCORE: 16
ADLS_ACUITY_SCORE: 18
ADLS_ACUITY_SCORE: 23
ADLS_ACUITY_SCORE: 29
ADLS_ACUITY_SCORE: 18
ADLS_ACUITY_SCORE: 27
ADLS_ACUITY_SCORE: 20
ADLS_ACUITY_SCORE: 19
ADLS_ACUITY_SCORE: 29
ADLS_ACUITY_SCORE: 29
ADLS_ACUITY_SCORE: 14
ADLS_ACUITY_SCORE: 14
ADLS_ACUITY_SCORE: 27
ADLS_ACUITY_SCORE: 19
ADLS_ACUITY_SCORE: 29
ADLS_ACUITY_SCORE: 14
ADLS_ACUITY_SCORE: 27
ADLS_ACUITY_SCORE: 20
ADLS_ACUITY_SCORE: 27
ADLS_ACUITY_SCORE: 27
ADLS_ACUITY_SCORE: 29
ADLS_ACUITY_SCORE: 16
ADLS_ACUITY_SCORE: 29
ADLS_ACUITY_SCORE: 24
ADLS_ACUITY_SCORE: 23
ADLS_ACUITY_SCORE: 10
CURRENT_FUNCTION: HOUSEWORK REQUIRES ASSISTANCE
ADLS_ACUITY_SCORE: 29
ADLS_ACUITY_SCORE: 12
ADLS_ACUITY_SCORE: 16
ADLS_ACUITY_SCORE: 27
ADLS_ACUITY_SCORE: 27
ADLS_ACUITY_SCORE: 25
ADLS_ACUITY_SCORE: 16
ADLS_ACUITY_SCORE: 16
ADLS_ACUITY_SCORE: 10
ADLS_ACUITY_SCORE: 10
ADLS_ACUITY_SCORE: 27
ADLS_ACUITY_SCORE: 16
ADLS_ACUITY_SCORE: 27
ADLS_ACUITY_SCORE: 27
ADLS_ACUITY_SCORE: 23
ADLS_ACUITY_SCORE: 16
ADLS_ACUITY_SCORE: 19
ADLS_ACUITY_SCORE: 27
ADLS_ACUITY_SCORE: 23
ADLS_ACUITY_SCORE: 22
ADLS_ACUITY_SCORE: 16
ADLS_ACUITY_SCORE: 10
ADLS_ACUITY_SCORE: 14
ADLS_ACUITY_SCORE: 16
CURRENT_FUNCTION: SHOPPING REQUIRES ASSISTANCE
ADLS_ACUITY_SCORE: 12
ADLS_ACUITY_SCORE: 20
ADLS_ACUITY_SCORE: 27
ADLS_ACUITY_SCORE: 16
ADLS_ACUITY_SCORE: 27
ADLS_ACUITY_SCORE: 11
ADLS_ACUITY_SCORE: 29
ADLS_ACUITY_SCORE: 22
ADLS_ACUITY_SCORE: 26
ADLS_ACUITY_SCORE: 29
ADLS_ACUITY_SCORE: 11
ADLS_ACUITY_SCORE: 24
ADLS_ACUITY_SCORE: 29
ADLS_ACUITY_SCORE: 24
ADLS_ACUITY_SCORE: 23
ADLS_ACUITY_SCORE: 29
ADLS_ACUITY_SCORE: 23
ADLS_ACUITY_SCORE: 16
ADLS_ACUITY_SCORE: 23
ADLS_ACUITY_SCORE: 16
ADLS_ACUITY_SCORE: 29
ADLS_ACUITY_SCORE: 29
ADLS_ACUITY_SCORE: 27
ADLS_ACUITY_SCORE: 29
ADLS_ACUITY_SCORE: 29
ADLS_ACUITY_SCORE: 20

## 2021-01-01 ASSESSMENT — ENCOUNTER SYMPTOMS
ACTIVITY CHANGE: 1
JOINT SWELLING: 1
FEVER: 1
ABDOMINAL PAIN: 0
BACK PAIN: 0
FEVER: 0
ARTHRALGIAS: 0
DYSURIA: 0
CHILLS: 1
HEMATOCHEZIA: 0
EYE PAIN: 0
FATIGUE: 0
PALPITATIONS: 0
DIFFICULTY URINATING: 0
DIARRHEA: 0
SHORTNESS OF BREATH: 0
CONFUSION: 1
CONSTIPATION: 0
DYSURIA: 0
SHORTNESS OF BREATH: 1
COUGH: 0
SEIZURES: 0
HEADACHES: 0
PALPITATIONS: 0
HEMATURIA: 0
CHILLS: 0
SORE THROAT: 0
DIARRHEA: 0
NAUSEA: 0
MYALGIAS: 1
ARTHRALGIAS: 1
SLEEP DISTURBANCE: 1
FEVER: 0
RESPIRATORY NEGATIVE: 1
FREQUENCY: 0
LIGHT-HEADEDNESS: 0
HEARTBURN: 0
WEAKNESS: 1
HEADACHES: 0
FATIGUE: 1
JOINT SWELLING: 1
FATIGUE: 1
VOMITING: 0
CARDIOVASCULAR NEGATIVE: 1
JOINT SWELLING: 1
CONSTITUTIONAL NEGATIVE: 1
WEAKNESS: 0
CHILLS: 0
DIZZINESS: 0
CHEST TIGHTNESS: 0
APPETITE CHANGE: 1
COUGH: 1
SHORTNESS OF BREATH: 0
NERVOUS/ANXIOUS: 0
PARESTHESIAS: 0

## 2021-01-01 ASSESSMENT — MIFFLIN-ST. JEOR
SCORE: 1428.86
SCORE: 1456.54
SCORE: 1429.77
SCORE: 1444.13
SCORE: 1473.33
SCORE: 1471.06
SCORE: 1429.37
SCORE: 1428.86
SCORE: 1457.45
SCORE: 1417.01

## 2021-01-01 ASSESSMENT — PAIN SCALES - GENERAL
PAINLEVEL: MILD PAIN (2)
PAINLEVEL: SEVERE PAIN (7)
PAINLEVEL: NO PAIN (0)

## 2021-01-01 ASSESSMENT — LIFESTYLE VARIABLES: TOBACCO_USE: 1

## 2021-08-05 PROBLEM — Z00.00 ANNUAL PHYSICAL EXAM: Status: ACTIVE | Noted: 2021-01-01

## 2021-08-05 PROBLEM — Z11.59 NEED FOR HEPATITIS C SCREENING TEST: Status: ACTIVE | Noted: 2021-01-01

## 2021-08-05 NOTE — PATIENT INSTRUCTIONS
Lab work ordered for annual physical  Lab work ordered for gout, referral to rheumatologist, medications ordered. Take colchicine 0.6 mg twice daily for 3 months or until asked by the physician to stop.

## 2021-08-05 NOTE — PROGRESS NOTES
"SUBJECTIVE:   Stephen Valerio is a 80 year old male who presents for Preventive Visit.    Mr Stephen Valerio is a 80-year-old male who is accompanied by his daughter and grandson for interpretation.  Patient is Thai-speaking.  Patient has history of gout for a long time.  According to chart review, he has seen multiple primary care physicians in the past and was undergoing treatment by rheumatologists and he was on colchicine.  According to the family he was recently in [place] when he had acute gout flare and was prescribed allopurinol, they do not know if patient was on colchicine.  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  His bilateral knees are swollen but minimally tender.  He does not appear to be in gout flare at this point.   Patient used to smoke but quit long time ago, does not take alcohol, he has mild hearing problem where family has to speak louder, he eats by himself uses bathroom by himself, his memory is okay per family however he does forget small details.  Mini cog score is 2 which is low risk of cognitive impairment.  He is up-to-date with vaccination except shingles.  I explained to the family that they can get shingles vaccine from the pharmacy.  He has other medical conditions that need to be addressed, which will be at follow-up in 2 months.  Explained to the family that they need to be regular with follow-ups so that his chronic conditions can be addressed.      Patient has been advised of split billing requirements and indicates understanding: Yes   Are you in the first 12 months of your Medicare coverage?  No     Healthy Habits:     In general, how would you rate your overall health?  Poor    Frequency of exercise:  2-3 days/week    Duration of exercise:  15-30 minutes    Do you usually eat at least 4 servings of fruit and vegetables a day, include whole grains    & fiber and avoid regularly eating high fat or \"junk\" foods?  Yes    Taking medications regularly:  " Yes    Medication side effects:  None    Ability to successfully perform activities of daily living:  Telephone requires assistance, transportation requires assistance, shopping requires assistance, preparing meals requires assistance, housework requires assistance, laundry requires assistance and medication administration requires assistance    Home Safety:  Throw rugs in the hallway and lack of grab bars in the bathroom    Hearing Impairment:  Feel that people are mumbling or not speaking clearly, need to ask people to speak up or repeat themselves, find that men's voices are easier to understand than woman's, difficulty understanding soft or whispered speech and difficulty understanding speech on the telephone    In the past 6 months, have you been bothered by leaking of urine?  No    In general, how would you rate your overall mental or emotional health?  Good      PHQ-2 Total Score: 0    Additional concerns today:  Yes    Do you feel safe in your environment? Yes    Have you ever done Advance Care Planning? (For example, a Health Directive, POLST, or a discussion with a medical provider or your loved ones about your wishes): No, advance care planning information given to patient to review.  Patient declined advance care planning discussion at this time.       Fall risk  Fallen 2 or more times in the past year?: No  Any fall with injury in the past year?: No    Cognitive Screening   1) Repeat 3 items (Leader, Season, Table)    2) Clock draw: NORMAL  3) 3 item recall: Recalls 2 objects   Results: NORMAL clock, 2 items recalled: COGNITIVE IMPAIRMENT LESS LIKELY    Mini-CogTM Copyright CARLENE Aguirre. Licensed by the author for use in [facility]; reprinted with permission (any@.[facility]). All rights reserved.      Do you have sleep apnea, excessive snoring or daytime drowsiness?: no    Reviewed and updated as needed this visit by clinical staff                 Reviewed and updated as needed this visit by  Provider                Social History     Tobacco Use     Smoking status: Former Smoker     Smokeless tobacco: Never Used   Substance Use Topics     Alcohol use: No     If you drink alcohol do you typically have >3 drinks per day or >7 drinks per week? No    No flowsheet data found.            Current providers sharing in care for this patient include:   Patient Care Team:  Fransisco Conte MD as PCP - General (Internal Medicine)  Clinic, [place]    The following health maintenance items are reviewed in Epic and correct as of today:  Health Maintenance Due   Topic Date Due     URINE DRUG SCREEN  Never done     ANNUAL REVIEW OF HM ORDERS  Never done     ADVANCE CARE PLANNING  Never done     COVID-19 Vaccine (1) Never done     HEPATITIS C SCREENING  Never done     ZOSTER IMMUNIZATION (1 of 2) Never done     MEDICARE ANNUAL WELLNESS VISIT  Never done     FALL RISK ASSESSMENT  Never done     LIPID  04/12/2019     PHQ-2  Never done     Lab work is in process      Review of Systems   Constitutional: Positive for fatigue. Negative for chills and fever.   HENT: Negative for congestion, ear pain, hearing loss and sore throat.    Eyes: Negative for pain and visual disturbance.   Respiratory: Negative for cough and shortness of breath.    Cardiovascular: Positive for peripheral edema. Negative for chest pain and palpitations.   Gastrointestinal: Negative for abdominal pain, constipation, diarrhea, heartburn, hematochezia and nausea.   Genitourinary: Negative for dysuria, frequency, genital sores, hematuria and urgency.   Musculoskeletal: Positive for arthralgias, joint swelling and myalgias.   Skin: Negative for rash.   Neurological: Negative for dizziness, weakness, headaches and paresthesias.   Psychiatric/Behavioral: Negative for mood changes. The patient is not nervous/anxious.          OBJECTIVE:   There were no vitals taken for this visit. Estimated body mass index is 24.41 kg/m  as calculated from the following:     "Height as of 3/3/14: 1.732 m (5' 8.2\").    Weight as of 4/9/14: 73.3 kg (161 lb 8 oz).  Physical Exam    ASSESSMENT / PLAN:       ICD-10-CM    1. Annual physical exam  Z00.00 REVIEW OF HEALTH MAINTENANCE PROTOCOL ORDERS     Hepatitis C Screen Reflex to HCV RNA Quant and Genotype     Lipid panel reflex to direct LDL Fasting     CBC with Platelets (Today)     COMPREHENSIVE METABOLIC PANEL     Hemoglobin A1c     TSH with free T4 reflex     Hepatitis C Screen Reflex to HCV RNA Quant and Genotype     Lipid panel reflex to direct LDL Fasting     CBC with Platelets (Today)     COMPREHENSIVE METABOLIC PANEL     Hemoglobin A1c     TSH with free T4 reflex   2. Chronic gout of knee, unspecified cause, unspecified laterality  M1A.0690 Uric acid     Rheumatology Referral     colchicine (COLCYRS) 0.6 MG tablet     Uric acid   3. Need for hepatitis C screening test  Z11.59 Hepatitis C Screen Reflex to HCV RNA Quant and Genotype     Hepatitis C Screen Reflex to HCV RNA Quant and Genotype   4. Hyperlipidemia LDL goal <130  E78.5 Lipid panel reflex to direct LDL Fasting     Lipid panel reflex to direct LDL Fasting     Diagnoses and all orders for this visit:  Annual physical exam  Up-to-date with vaccination except shingles.  Patient will ask pharmacy for shingles vaccine.  Lab work ordered.  Mini cog score is 2 which is low risk for cognitive impairment.  -     REVIEW OF HEALTH MAINTENANCE PROTOCOL ORDERS  -     Hepatitis C Screen Reflex to HCV RNA Quant and Genotype; Future  -     Lipid panel reflex to direct LDL Fasting; Future  -     CBC with Platelets (Today); Future  -     COMPREHENSIVE METABOLIC PANEL; Future  -     Hemoglobin A1c; Future  -     TSH with free T4 reflex; Future      Chronic gout of knee, unspecified cause, unspecified laterality  Chronic gout, noncompliance with medications, unable to follow-up with rheumatologist or previous primary care physicians.  Patient's family do not know if he was on colchicine at some " "point however chart review shows that he was prescribed it.  Currently his right hand is deformed from chronic disease as well as bilateral knees.  He uses a cane to walk.  Discussed with the patient that he needs to be on allopurinol to prevent further attacks and follow-up with a rheumatologist.  I started colchicine 0.6 mg twice daily.  I have discussed the side effects of colchicine with family and asked them to stop if diarrhea occurs.  -     Uric acid; Future  -     Rheumatology Referral; Future  -     colchicine (COLCYRS) 0.6 MG tablet; Take 1 tablet (0.6 mg) by mouth 2 times daily    Need for hepatitis C screening test  -     Hepatitis C Screen Reflex to HCV RNA Quant and Genotype; Future    Hyperlipidemia LDL goal <130  -     Lipid panel reflex to direct LDL Fasting; Future    Patient has been advised of split billing requirements and indicates understanding: Yes  COUNSELING:  Reviewed preventive health counseling, as reflected in patient instructions       Healthy diet/nutrition    Estimated body mass index is 24.41 kg/m  as calculated from the following:    Height as of 3/3/14: 1.732 m (5' 8.2\").    Weight as of 4/9/14: 73.3 kg (161 lb 8 oz).    Weight management plan: Discussed healthy diet and exercise guidelines    He reports that he has quit smoking. He has never used smokeless tobacco.      Appropriate preventive services were discussed with this patient, including applicable screening as appropriate for cardiovascular disease, diabetes, osteopenia/osteoporosis, and glaucoma.  As appropriate for age/gender, discussed screening for colorectal cancer, prostate cancer, breast cancer, and cervical cancer. Checklist reviewing preventive services available has been given to the patient.    Reviewed patients plan of care and provided an AVS. The Basic Care Plan (routine screening as documented in Health Maintenance) for Stephen meets the Care Plan requirement. This Care Plan has been established and reviewed " with the daughter.      ORACIO GRANT MD  [facility]    Identified Health Risks:

## 2021-08-06 NOTE — TELEPHONE ENCOUNTER
PCP family called with patient on the phone     Stating a message was left asking for them to call back     Advised I do not see any notes in chart where we left a message     Did you try calling patient?     Advised will call back if any concerns or we need to reach patient     Rita WOODS RN

## 2021-08-06 NOTE — TELEPHONE ENCOUNTER
Called patient regarding lab results. Him and his daughter speak Scottish. I was able to get hold of his granddaughter who wrote down the instructions about medication. Decreasing the dose of allopurinol to 50 mg (take half the tablet of 100 mg) due to decreased kidney function. I asked them to make an appointment as I want to see him sooner than planned at the visit. TSH is high, Free T4 is normal.

## 2021-08-09 NOTE — TELEPHONE ENCOUNTER
Pt called back via  with his daughter in law on the line with him     Notified of below message from provider     And scheduled future visit     Next 5 appointments (look out 90 days)    Aug 16, 2021  7:00 AM  Office Visit with Juliet Srinivasan MD  [facility] ([facility] ) [address], [address]  [place] [address]  [phone]   Oct 05, 2021  8:00 AM  Office Visit with Juliet Srinivasan MD  [facility] ([facility] ) [address], [address]  [place] [address]  [phone]        Rita WOODS RN

## 2021-08-12 NOTE — PATIENT INSTRUCTIONS
Diagnosis:  1. Inflammatory arthritis, hands/wrists: Examination shows swelling decreased range of motion and tenderness in many knuckles fingers and wrists, suggestive of rheumatoid arthritis.  2. Osteoarthritis, left more so than right knee  3. Possible gout    Plan:    1.  Check hand x-rays, blood work for inflammation and rheumatoid arthritis.  2.  Start course of prednisone.  Take 3 tablets (15 mg) once daily for 10 days, then take 2 tablets) 10 mg) once daily for 10 days.  Report to rheumatology if no improvement in hand and wrist joint pain.  3.  Continue allopurinol 100 mg daily.  Take medicine every day in order to suppress the blood substance that causes gout (uric acid).  4.  Hold colchicine for now.  5.  Consider referral to sports medicine or orthopedics for management of advanced osteoarthritis of the left knee after observing how much pain relief occurs with the prednisone course.  6.  If prednisone is helpful, plan to start medication (methotrexate) to treat rheumatoid arthritis at next visit.

## 2021-08-12 NOTE — PROGRESS NOTES
Rheumatology Clinic Visit 1     Stephen Valerio MRN# [id number]   YOB: 1940 Age: 80 year old     Date of Visit: 08/12/2021  Primary care provider: Juliet Srinivasan          Assessment and Plan:     # inflammatory arthritis: Patient( through  grandson) relates waxing and waning swelling, warmth, tenderness, and decreased range of motion in fingers wrists hands and elbows.  Physical examination shows tenderness to palpation at multiple PIPs; decreased fist formation; no gross synovitis at both wrists, and nontender nodule at the left olecranon process.    Laboratory evaluation on August 5, 2021 showed comprehensive metabolic panel remarkable for creatinine of 1.55, up from 0.9 in 2014.  Transaminases, hemoglobin A1c, and electrolytes were normal.  TSH was elevated at 9.42.  CBC showed hemoglobin 12.4, down from 13.9 in 2014.  Hepatitis C was negative.    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  Longstanding gouty arthritis can cause chronic synovial swelling as well, but the absolute symmetry of chronic waxing and waning inflammatory arthritis in the hands and wrists, as opposed to episodic acute inflammatory arthropathy, argues more for chronic autoimmune arthritis. I recommend evaluating markers of severe disease (rheumatoid factor, cyclic citrullinated peptide) and obtaining hand x-rays to look for characteristic erosive disease.    # L > R knee pain: Patient describes worsening instability symptoms in the left knee.  Exam shows extreme left knee varus, exacerbated with weightbearing.  There is no effusion today.  I agree that most symptoms in the knees are mechanical in nature.  Referral to orthopedics or sports medicine for consideration of local treatments (injection, bracing, surgery) will be in order.  I will make referral after observing effect of systemic corticosteroid on knee symptoms.    #Possible gout: I could not elicit a history  of joint aspiration for crystal diagnosis.  Exam does not show tophi.  Uric acid was 7.2.  Foot x-rays in 2014 showed pes cavus deformities no fracture.     History of intermittent/episodic lower extremity predominant inflammatory arthritis responsive to colchicine is consistent with gout.  Hyperuricemia is not present, but uric acid level is an insensitive measure of active inflammatory arthritis due to gout.  I think that rheumatoid arthritis provides a more compelling unifying diagnosis.  However, if acute inflammatory oligoarthritis recurs, examination of joint fluid with polarizing microscopy would be very helpful in establishing the diagnosis. I recommend continuing empiric allopurinol 100 mg daily for the present.  Allopurinol can be withdrawn if rheumatoid arthritis diagnosis is secured with laboratory and imaging studies.    Orders Placed This Encounter   Procedures     Comprehensive metabolic panel     Standing Status:   Future     Standing Expiration Date:   2/11/2022     Rheumatoid factor     Standing Status:   Future     Standing Expiration Date:   2/11/2022     Cyclic Citrullinated Peptide Antibody IgG     Standing Status:   Future     Standing Expiration Date:   2/11/2022     CBC with platelets     Standing Status:   Future     Standing Expiration Date:   2/11/2022     CRP inflammation     Standing Status:   Future     Standing Expiration Date:   2/11/2022     Erythrocyte sedimentation rate auto     Standing Status:   Future     Standing Expiration Date:   2/11/2022   We discussed the following:    Plan:    1.  Check hand x-rays, blood work for inflammation and rheumatoid arthritis.  2.  Start course of prednisone.  Take 3 tablets (15 mg) once daily for 10 days, then take 2 tablets) 10 mg) once daily for 10 days.  Report to rheumatology if no improvement in hand and wrist joint pain.  3.  Continue allopurinol 100 mg daily.  Take medicine every day in order to suppress the blood substance that causes  gout (uric acid).  4.  Hold colchicine for now.  5.  Consider referral to sports medicine or orthopedics for management of advanced osteoarthritis of the left knee after observing how much pain relief occurs with the prednisone course.  6.  If prednisone is helpful, plan to start medication (methotrexate) to treat rheumatoid arthritis at next visit.    RTC 1 mo  Shayne Joe MD  Staff Rheumatologist, [facility]              Active Problem List:     Patient Active Problem List    Diagnosis Date Noted     Annual physical exam 08/05/2021     Priority: Medium     Need for hepatitis C screening test 08/05/2021     Priority: Medium     Hyperlipidemia LDL goal <130 04/08/2014     Priority: Medium     CARDIOVASCULAR SCREENING; LDL GOAL LESS THAN 130 03/26/2014     Priority: Medium     Esophageal reflux 02/20/2014     Priority: Medium     Acute gouty arthritis 02/19/2014     Priority: Medium     Physical deconditioning 02/18/2014     Priority: Medium     Anemia 02/18/2014     Priority: Medium     Hyponatremia 02/18/2014     Priority: Medium     Gout      Priority: Medium     Chronic back pain      Priority: Medium            History of Present Illness:   Stephen Valerio presents for evaluation of gout. Referred by Dr. Srinivasan.  Visit is conducted in the presence of the patient's daughter-in-law, and of his grandson, who provides Vietnamese interpretation    Patient was seen by Dr. Srinivasan on August 5, 2021 for annual physical exam.  Impression was of chronic lower extremity predominant joint pain with inflammatory features consistent with gout and chronic noncompliance with medications.  Recommendation was to restart uric acid lowering medication and to follow-up with rheumatology.    Today he reports increasing pain in both knees and both wrists for many months. His daughter in law and grandson report that patient is afflicted by hand, wrist and finger and pain that occurs every day, is asst'd with swelling, and  "makes it hard to use cane, cartry objects.    In addition, he has been affected by \"gout attacks\" intermittently, last 3 months ago. L > R knee, both wrists, R > L ankle. Episodes last several days, and have occurred about 10 times in the past year. Has taken zyloprim and colchicine in the past. Recently, he has been taking allopurinol daily since a severe attack 3 months ago in [place]. He reports taking allopurinol 100 mg every day. He started colchicine one week ago on advice of Dr. Srinivasan.    He has long standing instability in the L knee with \"shifts\" in the joint as he walks. Recently has fallen several times, despite walking with a cane at all times.  His family members have noticed marked change in the shape of his knee with a \"bowlegged\" appearance.  He has had cortisone injections in both knees, but none for at least several years.         Review of Systems:       Constitutional: negative  Skin: negative  Eyes: negative  Ears/Nose/Throat: negative  Respiratory: No shortness of breath, dyspnea on exertion, cough, or hemoptysis  Cardiovascular: negative  Gastrointestinal: negative  Genitourinary: negative  Musculoskeletal: HPI  Neurologic: negative  Psychiatric: negative  Hematologic/Lymphatic/Immunologic: negative  Endocrine: negative          Past Medical History:     Past Medical History:   Diagnosis Date     Arthritis      Chronic back pain      Gout      Hyperlipidemia LDL goal <130 4/8/2014     Past Surgical History:   Procedure Laterality Date     IRRIGATION AND DEBRIDEMENT HAND, COMBINED  2/11/2014    Procedure: COMBINED IRRIGATION AND DEBRIDEMENT HAND;  I&D Left Wrist;  Surgeon: Randy Perrin MD;  Location:  OR     #Degenerative arthritis of the knees, diagnosed before 2015  # Gout: Status post left wrist irrigation and debridement in 2014 for acute arthritis; culture negative, presumed gout.       Social History:     Social History     Occupational History     Not on file   Tobacco Use     " "Smoking status: Former Smoker     Smokeless tobacco: Never Used   Substance and Sexual Activity     Alcohol use: No     Drug use: No     Sexual activity: Never     Patient used to smoke but quit long time ago, does not take alcohol, he has mild hearing problem where family has to speak louder, he eats by himself uses bathroom by himself, his memory is okay per family however he does forget small details.        Family History:     Family History   Problem Relation Age of Onset     Diabetes Brother             Allergies:   No Known Allergies         Medications:     Current Outpatient Medications   Medication Sig Dispense Refill     allopurinol (ZYLOPRIM) 100 MG tablet Take 1 tablet (100 mg) by mouth daily 30 tablet 3     allopurinol (ZYLOPRIM) 100 MG tablet Take 100 mg by mouth daily       colchicine (COLCYRS) 0.6 MG tablet Take 1 tablet (0.6 mg) by mouth 2 times daily 60 tablet 3     fenofibrate (TRIGLIDE/LOFIBRA) 160 MG tablet Take 50 mg by mouth daily       furosemide (LASIX) 40 MG tablet Take 40 mg by mouth daily       glipiZIDE-metFORMIN (METAGLIP) 2.5-500 MG tablet Take 1 tablet by mouth 2 times daily (before meals)       predniSONE (DELTASONE) 5 MG tablet Take 1 tablet (5 mg) by mouth daily Take 3 tabs daily for 10 days, then 2 tabs daily for 10 days. 50 tablet 1            Physical Exam:   Blood pressure 123/59, pulse 92, temperature 97  F (36.1  C), temperature source Oral, height 1.732 m (5' 8.2\"), weight 77.3 kg (170 lb 8 oz), SpO2 94 %.  Wt Readings from Last 6 Encounters:   08/12/21 77.3 kg (170 lb 8 oz)   08/05/21 77.6 kg (171 lb)   04/09/14 73.3 kg (161 lb 8 oz)   03/26/14 75.3 kg (166 lb)   03/18/14 73.7 kg (162 lb 8 oz)   03/03/14 72.6 kg (160 lb)       Constitutional: well-developed, appearing stated age; cooperative  Eyes: nl EOM, PERRLA, vision, conjunctiva, sclera  ENT: nl external ears, nose, hearing, lips, teeth, gums, throat  No mucous membrane lesions, normal saliva pool  Neck: no mass or " thyroid enlargement  Resp: breathing unlabored  Lymph: no cervical, supraclavicular, inguinal or epitrochlear nodes  MS: Gross, warm synovitis present over both wrists; tenderness to palpation at multiple PIPs in both hands; reduced fist formation and  strength; left knee with severe knee varus; no effusion.  Nontender mobile mass present over left olecranon process.  No visible tophi at bunion bursae, prepatellar bursa, or in the pinnae.  Skin: no nail pitting, alopecia, rash, nodules or lesions  Neuro: nl cranial nerves, strength, sensation, DTRs.   Psych: nl judgement, orientation, memory, affect.         Data:     @  RHEUM RESULTS Latest Ref Rng & Units 2/11/2014 2/12/2014 2/13/2014   ALBUMIN 3.4 - 5.0 g/dL 3.3 - -   ALT 0 - 70 U/L 53 - -   AST 0 - 45 U/L 31 - -   CREATININE 0.66 - 1.25 mg/dL 0.58(L) 0.55(L) 0.55(L)   GFR ESTIMATE, IF BLACK >60 mL/min/1.7m2 >90 >90 >90   GFR ESTIMATE >60 mL/min/1.73m2 >90 >90 >90   HEMATOCRIT 40.0 - 53.0 % 33.1(L) 29.9(L) 29.2(L)   HEMOGLOBIN 13.3 - 17.7 g/dL 11.3(L) 10.0(L) 9.9(L)   HCVAB Nonreactive - - -   WBC 4.0 - 11.0 10e3/uL 7.9 9.1 9.1   RBC 4.40 - 5.90 10e6/uL 3.37(L) 3.05(L) 2.99(L)   RDW 10.0 - 15.0 % 13.2 13.3 13.0   MCHC 31.5 - 36.5 g/dL 34.1 33.4 33.9   MCV 78 - 100 fL 98 98 98   PLATELET COUNT 150 - 450 10e3/uL 259 238 276   ESR 0 - 20 mm/h 94(H) - -        ,  ,  ,  ,  ,  ,  ,  ,  ,  ,  ,  ,  ,  ,  ,  ,  ,  ,  ,  ,  ,  ,  ,  ,  ,  ,  ,  ,  ,  ,  ,  ,  ,  ,  ,   Albumin Fraction   Date Value Ref Range Status   03/18/2014 3.6 (L) 3.7 - 5.1 g/dL Final     Alpha 2 Fraction   Date Value Ref Range Status   03/18/2014 0.7 0.5 - 0.9 g/dL Final     Beta Fraction   Date Value Ref Range Status   03/18/2014 0.8 0.6 - 1.0 g/dL Final     Gamma Fraction   Date Value Ref Range Status   03/18/2014 1.3 0.7 - 1.6 g/dL Final     Monoclonal Peak   Date Value Ref Range Status   03/18/2014 0.0 0.0 g/dL Final     ELP Interpretation:   Date Value Ref Range Status   03/18/2014   Final     Essentially normal electrophoretic pattern.  No monoclonal protein seen.   Pathologic significance requires clinical correlation.  Fozia Torres M.D., Ph.D.     ,  ,  ,  ,  ,  ,  ,  ,  ,       Reviewed Rheumatology lab flowsheet

## 2021-08-12 NOTE — NURSING NOTE
"Chief Complaint   Patient presents with     Arthritis     both wrists and LT knee     /59   Pulse 92   Temp 97  F (36.1  C) (Oral)   Ht 1.732 m (5' 8.2\")   Wt 77.3 kg (170 lb 8 oz)   SpO2 94%   BMI 25.77 kg/m   Estimated body mass index is 25.77 kg/m  as calculated from the following:    Height as of this encounter: 1.732 m (5' 8.2\").    Weight as of this encounter: 77.3 kg (170 lb 8 oz).        CRISTIN Gaytan  "

## 2021-08-16 PROBLEM — R79.89 ELEVATED SERUM CREATININE: Status: ACTIVE | Noted: 2021-01-01

## 2021-08-16 PROBLEM — M06.9 RHEUMATOID ARTHRITIS (H): Status: ACTIVE | Noted: 2021-01-01

## 2021-08-16 NOTE — TELEPHONE ENCOUNTER
[facility] Call Center    Phone Message    May a detailed message be left on voicemail: yes     Reason for Call: URGENT appointment needed per referring provider for Acute Kidney Injury/Decreased eGFR. Sending TE message for clinic review and follow-up with patient.     Action Taken: Message routed to:  [facility] ([facility] Nephrology    Travel Screening: Not Applicable

## 2021-08-16 NOTE — PROGRESS NOTES
Assessment & Plan     Rheumatoid arthritis involving both hands with positive rheumatoid factor (H)  Bilateral hand deformity, recently seen by rheumatologist, lab work show elevated CRP, ESR and elevated RF.  Patient will be following up with rheumatologist in a month. I asked to call them and make an earlier appointment.   - Orthopedic  Referral; Future    Chronic gout of knee, unspecified cause, unspecified laterality  Patient has gout in bilateral knees and is on allopurinol 100 mg daily.  His kidney function is worsening and I have will decrease allopurinol to 50 mg daily.  Also giving referral for orthopedics for deformity and bilateral knees and hands  - Orthopedic  Referral; Future    Elevated serum creatinine  No history of hypertension or diabetes.  Patient is on Metformin (this was started in [place]) while his A1c done last month is 5.5.  Asked patient to discontinue metformin. No urinary/obstructive symptoms, no symptoms or history of kidney stones.  Referral to nephrology given.  We will repeat creatinine in 1 week and ordered ultrasound kidney ureter bladder to look for any anatomical causes of CHRISTOPHER.  - Adult Nephrology Referral; Future  - Creatinine; Future  - US Renal Complete; Future    Inflammatory arthritis  - allopurinol (ZYLOPRIM) 100 MG tablet; Take 0.5 tablets (50 mg) by mouth daily       See Patient Instructions    Return in about 4 weeks (around 9/13/2021) for in person, with me.    ORACIO GRANT MD  [facility] Mitchell is a 80 year old who presents for the following health issues  accompanied by his daughter-in-law:    HPI   I used  services at [phone] and asked for [place]i interpretor. I was helped by a  (ID [id number]) and  Aydee at [facility].   Mr Valerio is a 80 year old gentleman with past medical history of gout who presented to the clinic for a follow up.  Patient was seen by rheumatologist  recently and they had high suspicion of rheumatoid arthritis, lab work shows elevated CRP,  positive RF and hand x-ray showing erosions and degenerative changes.   Patient reports that prednisone has helped with bilateral hand pain.  His creatinine is elevated at 2.29 and GFR is 26.  No history of hypertension.  Patient denies urinary symptoms of obstruction.  No history of kidney stones.  Patient takes Metformin which was prescribed to him in [place] for diabetes however recent HbA1c done on 8/5/2021 is 5.5.       Review of Systems   Constitutional: Negative.    Respiratory: Negative.    Cardiovascular: Negative.    Genitourinary: Negative for difficulty urinating, dysuria, enuresis and urgency.   Musculoskeletal: Positive for gait problem and joint swelling. Negative for arthralgias and back pain.            Objective    There were no vitals taken for this visit.  There is no height or weight on file to calculate BMI.  Physical Exam  Musculoskeletal:      Comments: Bilateral hand deformity, no tenderness on palpation.  Bilateral knee swelling, no tenderness on palpation.        > 60 min spent on the date of this encounter doing chart review, documentation, history/exam, and further activities as noted above

## 2021-08-16 NOTE — PATIENT INSTRUCTIONS
Imaging ordered: ultrasound kidney ureter and bladder  Stop taking Metformin-glipizide  Decrease the dose of Allopurinol to 50 mg   Patient needs to see rheumatologist as soon as possible, giving referral for nephrology for worsening kidney function and orthopedics.   Repeating creatinine blood test in 1 week.

## 2021-08-18 NOTE — PROGRESS NOTES
Rheumatology Clinic Visit     Stephen Valerio MRN# [id number]   YOB: 1940 Age: 80 year old     Date of Visit: 08/21/2021  Primary care provider: Juliet Srinivasan          Assessment and Plan:     # Rheumatoid arthritis (upper extremity polyarthritis, RF+, CCP-): Patient (through  grandson) relates significant improvement in swelling, warmth, tenderness in fingers wrists hands and elbows over the past 2 weeks.  Physical examination shows reduced tenderness to palpation at multiple PIPs; improved fist formation; reduction in synovial swelling at both wrists.    Laboratory evaluation since last visit on August 12, 2021 revealed creatinine of 2.29, up from 1.55 on August 5; calcium was elevated at 10.3.  Transaminases and other indices of liver function were normal.  Cyclic citrullinated peptide antibodies were negative; CRP was elevated at 11.  Rheumatoid factor was low positive at 33 international units.  CBC showed hemoglobin 11.7, down from 12.41-week earlier.  Sed rate was elevated at 42.     Hand x-rays showed joint space loss in multiple MCP joints.  Degenerative changes noted in multiple DIPs.  Advanced radiocarpal joint degenerative changes in both wrists.  Subtle erosive changes at multiple PIPs.    Inflammatory polyarthritis is significantly improved, in association with starting prednisone in August 2021.  Rheumatoid factor is positive, and radiographs are consistent with longstanding, destructive inflammatory arthritis.  There may be contributions from both autoimmune arthritis and gouty arthropathy.  I recommend starting disease modifying antirheumatic therapy with methotrexate, and completing a 3-week course of corticosteroids.  We discussed the risks and benefits of low-dose methotrexate.  I expect maximum benefit from any given dose of methotrexate to occur within 1 month.  Dosage of methotrexate may be escalated monthly to a maximum of 25 mg as needed.  If inflammatory  symptoms are not sufficiently suppressed with methotrexate monotherapy, I will recommend addition of tumor necrosis factor inhibitor therapy or consider monotherapy with tofacitinib or abatacept.    # L > R knee pain: Patient describes worsening instability symptoms in the left knee.  Exam shows extreme left knee varus.  Picture is consistent with advanced medial compartment cartilage loss.  I do not expect improvement with medication.  I agree with primary care referral to orthopedics.    # Rising creatinine:  Renal ultrasound on August 17 showed normal kidneys.  Etiology is not clear. I agree with plans for renal consultation.  Avoid nonsteroidals, and use agents with potential nephrotoxicity with great caution.    #Possible gout: Present inflammatory arthritis with autoimmune features clouds interpretation of more distant history of episodic lower extremity predominant inflammatory arthritis.  Hand x-rays with periarticular erosions with well-corticated margins are consistent with gouty arthropathy, however.  I recommend continuing low-dose (50 mg) allopurinol daily at present.  I recommend that gout be maintained as an additional diagnostic construct.  If oligo articular abrupt onset inflammatory arthritis recurs, joint aspiration for crystal examination will help clarify contribution of gouty arthropathy to current picture.    Plan:    1.  Continue treatment for arthritis and gout  2.  Finish course of prednisone as recommended at last visit.  3.  Continue allopurinol 50 mg daily.  Take medicine every day in order to suppress the blood substance that causes gout (uric acid).  4.  Kidney doctor consultation  5. Orthopedics consultation  6. Start methotrexate 15 mg (6 tabs) once weekly. While on methotrexate:   -- Check labs every 3 months (AST/ALT, Albumin, CBC with platelets)   -- Limit alcohol intake to 2 drinks weekly; use folate 1 mg daily.  --Tylenol 500-1000 mg can be used as needed up to three times daily  for nausea/headache associated with methotrexate    RTC 6 weeks    Shyane Joe MD  Staff Rheumatologist, [facility]              Active Problem List:     Patient Active Problem List    Diagnosis Date Noted     Rheumatoid arthritis (H) 08/16/2021     Priority: Medium     Elevated serum creatinine 08/16/2021     Priority: Medium     Annual physical exam 08/05/2021     Priority: Medium     Need for hepatitis C screening test 08/05/2021     Priority: Medium     Hyperlipidemia LDL goal <130 04/08/2014     Priority: Medium     CARDIOVASCULAR SCREENING; LDL GOAL LESS THAN 130 03/26/2014     Priority: Medium     Esophageal reflux 02/20/2014     Priority: Medium     Acute gouty arthritis 02/19/2014     Priority: Medium     Physical deconditioning 02/18/2014     Priority: Medium     Anemia 02/18/2014     Priority: Medium     Hyponatremia 02/18/2014     Priority: Medium     Gout      Priority: Medium     Chronic back pain      Priority: Medium            History of Present Illness:   Stephen Valerio presents for follow up of arthritis.  He was last seen 5 days ago, when an impression of inflammatory arthritis of the hands and wrists was performed, crystal arthropathy versus rheumatoid arthritis.  Longstanding left greater than right knee pain was also noted with high suspicion for end-stage osteoarthritis.  Imaging and laboratory evaluation was ordered.    Interval history 08-:  Visit is conducted in the presence of the patient's daughter-in-law, and of his grandson, who provides [place] interpretation.     Patient reports that after starting prednisone several weeks ago, he had marked improvement in swelling, stiffness, and pain in his wrists and hands.  His left knee is still unstable and gives pain with walking on even surfaces.  He tolerated prednisone well.  He has had no fevers chills sweats skin rash cough shortness of breath chest pain.      Hx 8-12-21:  Visit is conducted in the presence of the  "patient's daughter-in-law, and of his grandson, who provides [place] interpretation    Patient was seen by Dr. Srinivasan on August 5, 2021 for annual physical exam.  Impression was of chronic lower extremity predominant joint pain with inflammatory features consistent with gout and chronic noncompliance with medications.  Recommendation was to restart uric acid lowering medication and to follow-up with rheumatology.    Today he reports increasing pain in both knees and both wrists for many months. His daughter in law and grandson report that patient is afflicted by hand, wrist and finger and pain that occurs every day, is asst'd with swelling, and makes it hard to use cane, cartry objects.    In addition, he has been affected by \"gout attacks\" intermittently, last 3 months ago. L > R knee, both wrists, R > L ankle. Episodes last several days, and have occurred about 10 times in the past year. Has taken zyloprim and colchicine in the past. Recently, he has been taking allopurinol daily since a severe attack 3 months ago in [place]. He reports taking allopurinol 100 mg every day. He started colchicine one week ago on advice of Dr. Srinivasan.    He has long standing instability in the L knee with \"shifts\" in the joint as he walks. Recently has fallen several times, despite walking with a cane at all times.  His family members have noticed marked change in the shape of his knee with a \"bowlegged\" appearance.  He has had cortisone injections in both knees, but none for at least several years.         Review of Systems:       Constitutional: negative  Skin: negative  Eyes: negative  Ears/Nose/Throat: negative  Respiratory: No shortness of breath, dyspnea on exertion, cough, or hemoptysis  Cardiovascular: negative  Gastrointestinal: negative  Genitourinary: negative  Musculoskeletal: HPI  Neurologic: negative  Psychiatric: negative  Hematologic/Lymphatic/Immunologic: negative  Endocrine: negative          Past Medical History: "     Past Medical History:   Diagnosis Date     Arthritis      Chronic back pain      Gout      Hyperlipidemia LDL goal <130 4/8/2014     Past Surgical History:   Procedure Laterality Date     IRRIGATION AND DEBRIDEMENT HAND, COMBINED  2/11/2014    Procedure: COMBINED IRRIGATION AND DEBRIDEMENT HAND;  I&D Left Wrist;  Surgeon: Randy Perrin MD;  Location: [facility] OR     #Degenerative arthritis of the knees, diagnosed before 2015  # Gout: Status post left wrist irrigation and debridement in 2014 for acute arthritis; culture negative, presumed gout.  #Rheumatoid arthritis: Chronic, symmetric, upper extremity predominant, prednisone-sensitive polyarthritis of wrists and hands, first recognized in July 2021. RF+, ACPA-negative.  Radiographs showed PIP erosions and joint space narrowing at MCPs.  Patient started on methotrexate August 2021.         Social History:     Social History     Occupational History     Not on file   Tobacco Use     Smoking status: Former Smoker     Smokeless tobacco: Never Used   Substance and Sexual Activity     Alcohol use: No     Drug use: No     Sexual activity: Never     Patient used to smoke but quit long time ago, does not take alcohol, he has mild hearing problem where family has to speak louder, he eats by himself uses bathroom by himself, his memory is okay per family however he does forget small details.        Family History:     Family History   Problem Relation Age of Onset     Diabetes Brother             Allergies:   No Known Allergies         Medications:     Current Outpatient Medications   Medication Sig Dispense Refill     allopurinol (ZYLOPRIM) 100 MG tablet Take 0.5 tablets (50 mg) by mouth daily 30 tablet 3     fenofibrate (TRIGLIDE/LOFIBRA) 160 MG tablet Take 50 mg by mouth daily       furosemide (LASIX) 40 MG tablet Take 40 mg by mouth daily       methotrexate 2.5 MG tablet Take 6 tablets (15 mg) by mouth every 7 days 24 tablet 2     predniSONE (DELTASONE) 5 MG tablet  "Take 1 tablet (5 mg) by mouth daily Take 3 tabs daily for 10 days, then 2 tabs daily for 10 days. 50 tablet 1     colchicine (COLCYRS) 0.6 MG tablet Take 1 tablet (0.6 mg) by mouth 2 times daily (Patient not taking: Reported on 8/19/2021) 60 tablet 3            Physical Exam:   Blood pressure 135/71, pulse 84, temperature 99  F (37.2  C), temperature source Oral, height 1.727 m (5' 8\"), weight 76.2 kg (168 lb), SpO2 94 %.  Wt Readings from Last 6 Encounters:   08/19/21 76.2 kg (168 lb)   08/16/21 76.3 kg (168 lb 3.2 oz)   08/12/21 77.3 kg (170 lb 8 oz)   08/05/21 77.6 kg (171 lb)   04/09/14 73.3 kg (161 lb 8 oz)   03/26/14 75.3 kg (166 lb)       Constitutional: well-developed, appearing stated age; cooperative  Eyes: nl EOM, PERRLA, vision, conjunctiva, sclera  ENT: nl external ears, nose, hearing, lips, teeth, gums, throat  No mucous membrane lesions, normal saliva pool  Neck: no mass or thyroid enlargement  Resp: breathing unlabored  Lymph: no cervical, supraclavicular, inguinal or epitrochlear nodes  MS: Synovial thickening present over both wrists with reduced flexion and extension left greater than right; reduced synovial thickening and tenderness at multiple MCPs and PIPs.  Fist formation improved.  Nontender mobile mass present over left olecranon process.  Severe left knee varus; no effusion or joint line tenderness.  Skin: no nail pitting, alopecia, rash, nodules or lesions;  visible masses at bunion bursae, prepatellar bursa, or in the pinnae.  Neuro: nl cranial nerves, strength, sensation, DTRs.   Psych: nl judgement, orientation, memory, affect.         Data:     @  RHEUM RESULTS Latest Ref Rng & Units 2/11/2014 2/12/2014 2/13/2014   ALBUMIN 3.4 - 5.0 g/dL 3.3 - -   ALT 0 - 70 U/L 53 - -   AST 0 - 45 U/L 31 - -   CREATININE 0.66 - 1.25 mg/dL 0.58(L) 0.55(L) 0.55(L)   CRP 0.0 - 8.0 mg/L - - -   GFR ESTIMATE, IF BLACK >60 mL/min/1.7m2 >90 >90 >90   GFR ESTIMATE >60 mL/min/1.73m2 >90 >90 >90   HEMATOCRIT " 40.0 - 53.0 % 33.1(L) 29.9(L) 29.2(L)   HEMOGLOBIN 13.3 - 17.7 g/dL 11.3(L) 10.0(L) 9.9(L)   HCVAB Nonreactive - - -   WBC 4.0 - 11.0 10e3/uL 7.9 9.1 9.1   RBC 4.40 - 5.90 10e6/uL 3.37(L) 3.05(L) 2.99(L)   RDW 10.0 - 15.0 % 13.2 13.3 13.0   MCHC 31.5 - 36.5 g/dL 34.1 33.4 33.9   MCV 78 - 100 fL 98 98 98   PLATELET COUNT 150 - 450 10e3/uL 259 238 276   RHEUMATOID FACTOR <20 IU/mL - - -   ESR 0 - 20 mm/hr 94(H) - -        ,  ,  ,  ,  ,  ,  ,  ,  ,  ,  ,  ,  ,  ,  ,  ,  ,  ,  ,  ,  ,  ,  ,  ,  ,  ,  ,  ,  ,  ,  ,  ,  ,  ,  ,   Albumin Fraction   Date Value Ref Range Status   03/18/2014 3.6 (L) 3.7 - 5.1 g/dL Final     Alpha 2 Fraction   Date Value Ref Range Status   03/18/2014 0.7 0.5 - 0.9 g/dL Final     Beta Fraction   Date Value Ref Range Status   03/18/2014 0.8 0.6 - 1.0 g/dL Final     Gamma Fraction   Date Value Ref Range Status   03/18/2014 1.3 0.7 - 1.6 g/dL Final     Monoclonal Peak   Date Value Ref Range Status   03/18/2014 0.0 0.0 g/dL Final     ELP Interpretation:   Date Value Ref Range Status   03/18/2014   Final    Essentially normal electrophoretic pattern.  No monoclonal protein seen.   Pathologic significance requires clinical correlation.  Fozia Torres M.D., Ph.D.     ,  ,  ,  ,  ,  ,  ,  ,  ,       Reviewed Rheumatology lab flowsheet

## 2021-08-19 NOTE — PATIENT INSTRUCTIONS
Diagnosis:  1. Rheumatoid arthritis: improved with prednisone.  2. Osteoarthritis, left more so than right knee  3. Possible gout    Plan:    1.  Continue treatment for arthritis and gout  2.  Finish course of prednisone as recommended last visit.  3.  Continue allopurinol 50 mg daily.  Take medicine every day in order to suppress the blood substance that causes gout (uric acid).  4.  Kidney doctor consultation  5. Orthopedics consultation  6. Start methotrexate 15 mg (6 tabs) once weekly. While on methotrexate:   -- Check labs every 3 months (AST/ALT, Albumin, CBC with platelets)   -- Limit alcohol intake to 2 drinks weekly; use folate 1 mg daily.  --Tylenol 500-1000 mg can be used as needed up to three times daily for nausea/headache associated with methotrexate

## 2021-08-19 NOTE — TELEPHONE ENCOUNTER
[facility] Call Center    Phone Message    May a detailed message be left on voicemail: yes     Reason for Call: Patient would like to schedule an appointment.  Per referral, this is urgent.  Please reach out to Antonieta.    Action Taken: Message routed to:  [facility] ([facility] Nephrology    Travel Screening: Not Applicable

## 2021-08-19 NOTE — NURSING NOTE
"Chief Complaint   Patient presents with     Arthritis       Vitals:    08/19/21 1338   BP: 135/71   Pulse: 84   Temp: 99  F (37.2  C)   TempSrc: Oral   SpO2: 94%   Weight: 76.2 kg (168 lb)   Height: 1.727 m (5' 8\")       Body mass index is 25.54 kg/m .     Maryann DAVID  "

## 2021-09-07 NOTE — PROGRESS NOTES
CHIEF COMPLAINT:  Pain of the Left Knee and Pain of the Right Knee     HISTORY OF PRESENT ILLNESS  Mr. Galindo Valerio is a pleasant 80 year old year old male who presents to clinic today with bilateral knee pain, right worse than left.  Stephen explains that the rheumatologist told the patient that he has rheumatoid arthritis. They are concerned about the deformity in his knees. He is here with his grandson, who is interpreting for him today.    Onset: gradual, worsening  Location: bilateral knee  Quality:  aching and dull  Duration: 5-7 years   Severity: 8/10 at worst  Timing:constant, worse with walking   Modifying factors:  resting and non-use makes it better, movement and use makes it worse  Associated signs & symptoms: swelling, cramping around knees   Previous similar pain: No  Treatments to date: previous cortisone injections (last injection in [place] ~5 year ago) which provided a good amount of relief, pain medication (name unknown).  Lasted 1 month only.  Uses Cane    On methotrexate for RA.  Seeing Dr. Joe and methotrexate started for this.  Continued pain of hands, stiffness and increased left hand swelling.    Additional history: as documented    Review of Systems:    Have you recently had a a fever, chills, weight loss? No    Do you have any vision problems? Yes, corrective lenses    Do you have any chest pain or edema? No    Do you have any shortness of breath or wheezing?  No    Do you have stomach problems? No    Do you have any numbness or focal weakness? No    Do you have diabetes? Was told he was diabetic in [place], but recently told he was not    Do you have problems with bleeding or clotting? No    Do you have an rashes or other skin lesions? No    MEDICAL HISTORY  Patient Active Problem List   Diagnosis     Physical deconditioning     Anemia     Hyponatremia     Gout     Chronic back pain     Acute gouty arthritis     Esophageal reflux     CARDIOVASCULAR SCREENING; LDL GOAL LESS THAN 130      Hyperlipidemia LDL goal <130     Annual physical exam     Need for hepatitis C screening test     Rheumatoid arthritis (H)     Elevated serum creatinine       Current Outpatient Medications   Medication Sig Dispense Refill     allopurinol (ZYLOPRIM) 100 MG tablet Take 0.5 tablets (50 mg) by mouth daily 30 tablet 3     fenofibrate (TRIGLIDE/LOFIBRA) 160 MG tablet Take 50 mg by mouth daily       furosemide (LASIX) 40 MG tablet Take 40 mg by mouth daily       methotrexate 2.5 MG tablet Take 6 tablets (15 mg) by mouth every 7 days 24 tablet 2     colchicine (COLCYRS) 0.6 MG tablet Take 1 tablet (0.6 mg) by mouth 2 times daily (Patient not taking: Reported on 8/19/2021) 60 tablet 3     predniSONE (DELTASONE) 5 MG tablet Take 1 tablet (5 mg) by mouth daily Take 3 tabs daily for 10 days, then 2 tabs daily for 10 days. (Patient not taking: Reported on 9/8/2021) 50 tablet 1       No Known Allergies    Family History   Problem Relation Age of Onset     Diabetes Brother        Additional medical/Social/Surgical histories reviewed in [facility] and updated as appropriate.       PHYSICAL EXAM  /56   Wt 79.2 kg (174 lb 9.6 oz)   BMI 26.55 kg/m      General  - normal appearance, in no obvious distress  Musculoskeletal - knees  - stance: antalgic gait, genu varum bilaterally, on left significantly exaggerated. Using cane.  - inspection: generalized swelling, mild effusion.  - palpation: medial joint line tenderness bilaterally, patella and patellar tendon non-tender, normal popliteal pulse  - ROM: 100 degrees flexion, 0 degrees extension, painful active ROM  - strength: 5/5 in flexion, 5/5 in extension  - neuro: no sensory or motor deficit  - special tests:  (-) Lachman  (-) anterior drawer  (-) posterior drawer  (-) Niru  (-) varus at 0 and 30 degrees flexion  (+) valgus at 0 and 30 degrees flexion on left  Neuro  - no sensory or motor deficit, grossly normal coordination, normal muscle tone  Skin  - no ecchymosis, erythema,  warmth, or induration, no obvious rash  Psych  - interactive, appropriate, normal mood and affect    IMAGING : XR KNEE BILAT 3V. Final results and radiologist's interpretation, available in the [facility] health record. Images were reviewed with the patient/family members in the office today. My personal interpretation of the performed imaging is severe medial      ASSESSMENT & PLAN  Mr. Galindo Valerio is a 80 year old year old male who presents to clinic today with bilateral knee pain and concern for varus deformity.      Diagnosis:  1. Primary osteoarthritis of bilateral knees.  2. Rheumatoid arthritis RF+ CCP-  3. Gouty arthritis    Given severity of his medial compartment osteoarthritis and lack of efficacy with previous corticosteroid injection, they would like to proceed with TKA consultation.  I am in agreement that the severity of his OA,, varus laxity and pain L>R would be most effectively managed with joint replacement.    Braces provided in the interim. Consider corticosteroid injection if surgery date >3 months. May return for this.    Continued swelling of hands Left>right, now on methotrexate last three weeks. Encouraged follow up Dr. Joe at next scheduled visit.    Current knee pain not consistent with known gouty arthritis.  Continue allopurinol for gout. .     Patient with daughter and grandson today. Interpretor used for visit.    It was a pleasure seeing Stephen today.    Francisco Jesus DO, CAM  Primary Care Sports Medicine

## 2021-09-08 NOTE — PATIENT INSTRUCTIONS
Thank you for choosing [facility]    DR FUENTES[facility]S CLINIC LOCATIONS  [place] [address] [address] [facility], [address]th [place]   [place], [place], [address] [place] [address]   [phone]       APPOINTMENTS: [phone]    CARE QUESTIONS: [phone], #3    BILLING QUESTIONS: [phone]    FAX NUMBER: [phone]        Follow up: as needed for a cortisone injection, you will be contacted about a consult with a surgeon.      1. Bilateral primary osteoarthritis of knee    2. Rheumatoid arthritis involving both hands with positive rheumatoid factor (H)    3. Chronic gout of knee, unspecified cause, unspecified laterality    4. Bilateral knee pain

## 2021-09-08 NOTE — LETTER
9/8/2021         RE: Stephen Valerio  [address] [facility] [address]        Dear Colleague,    Thank you for referring your patient, Stephen Valerio, to the [facility] [place]. Please see a copy of my visit note below.    CHIEF COMPLAINT:  Pain of the Left Knee and Pain of the Right Knee     HISTORY OF PRESENT ILLNESS  Mr. Galindo Valerio is a pleasant 80 year old year old male who presents to clinic today with bilateral knee pain, right worse than left.  Stephen explains that the rheumatologist told the patient that he has rheumatoid arthritis. They are concerned about the deformity in his knees. He is here with his grandson, who is interpreting for him today.    Onset: gradual, worsening  Location: bilateral knee  Quality:  aching and dull  Duration: 5-7 years   Severity: 8/10 at worst  Timing:constant, worse with walking   Modifying factors:  resting and non-use makes it better, movement and use makes it worse  Associated signs & symptoms: swelling, cramping around knees   Previous similar pain: No  Treatments to date: previous cortisone injections (last injection in [place] ~5 year ago) which provided a good amount of relief, pain medication (name unknown).  Lasted 1 month only.  Uses Cane    On methotrexate for RA.  Seeing Dr. Joe and methotrexate started for this.  Continued pain of hands, stiffness and increased left hand swelling.    Additional history: as documented    Review of Systems:    Have you recently had a a fever, chills, weight loss? No    Do you have any vision problems? Yes, corrective lenses    Do you have any chest pain or edema? No    Do you have any shortness of breath or wheezing?  No    Do you have stomach problems? No    Do you have any numbness or focal weakness? No    Do you have diabetes? Was told he was diabetic in [place], but recently told he was not    Do you have problems with bleeding or clotting? No    Do you have an  rashes or other skin lesions? No    MEDICAL HISTORY  Patient Active Problem List   Diagnosis     Physical deconditioning     Anemia     Hyponatremia     Gout     Chronic back pain     Acute gouty arthritis     Esophageal reflux     CARDIOVASCULAR SCREENING; LDL GOAL LESS THAN 130     Hyperlipidemia LDL goal <130     Annual physical exam     Need for hepatitis C screening test     Rheumatoid arthritis (H)     Elevated serum creatinine       Current Outpatient Medications   Medication Sig Dispense Refill     allopurinol (ZYLOPRIM) 100 MG tablet Take 0.5 tablets (50 mg) by mouth daily 30 tablet 3     fenofibrate (TRIGLIDE/LOFIBRA) 160 MG tablet Take 50 mg by mouth daily       furosemide (LASIX) 40 MG tablet Take 40 mg by mouth daily       methotrexate 2.5 MG tablet Take 6 tablets (15 mg) by mouth every 7 days 24 tablet 2     colchicine (COLCYRS) 0.6 MG tablet Take 1 tablet (0.6 mg) by mouth 2 times daily (Patient not taking: Reported on 8/19/2021) 60 tablet 3     predniSONE (DELTASONE) 5 MG tablet Take 1 tablet (5 mg) by mouth daily Take 3 tabs daily for 10 days, then 2 tabs daily for 10 days. (Patient not taking: Reported on 9/8/2021) 50 tablet 1       No Known Allergies    Family History   Problem Relation Age of Onset     Diabetes Brother        Additional medical/Social/Surgical histories reviewed in [facility] and updated as appropriate.       PHYSICAL EXAM  /56   Wt 79.2 kg (174 lb 9.6 oz)   BMI 26.55 kg/m      General  - normal appearance, in no obvious distress  Musculoskeletal - knees  - stance: antalgic gait, genu varum bilaterally, on left significantly exaggerated. Using cane.  - inspection: generalized swelling, mild effusion.  - palpation: medial joint line tenderness bilaterally, patella and patellar tendon non-tender, normal popliteal pulse  - ROM: 100 degrees flexion, 0 degrees extension, painful active ROM  - strength: 5/5 in flexion, 5/5 in extension  - neuro: no sensory or motor deficit  - special  tests:  (-) Lachman  (-) anterior drawer  (-) posterior drawer  (-) Niru  (-) varus at 0 and 30 degrees flexion  (+) valgus at 0 and 30 degrees flexion on left  Neuro  - no sensory or motor deficit, grossly normal coordination, normal muscle tone  Skin  - no ecchymosis, erythema, warmth, or induration, no obvious rash  Psych  - interactive, appropriate, normal mood and affect    IMAGING : XR KNEE BILAT 3V. Final results and radiologist's interpretation, available in the [facility] health record. Images were reviewed with the patient/family members in the office today. My personal interpretation of the performed imaging is severe medial      ASSESSMENT & PLAN  Mr. Galindo Valerio is a 80 year old year old male who presents to clinic today with bilateral knee pain and concern for varus deformity.      Diagnosis:  1. Primary osteoarthritis of bilateral knees.  2. Rheumatoid arthritis RF+ CCP-  3. Gouty arthritis    Given severity of his medial compartment osteoarthritis and lack of efficacy with previous corticosteroid injection, they would like to proceed with TKA consultation.  I am in agreement that the severity of his OA,, varus laxity and pain L>R would be most effectively managed with joint replacement.    Braces provided in the interim. Consider corticosteroid injection if surgery date >3 months. May return for this.    Continued swelling of hands Left>right, now on methotrexate last three weeks. Encouraged follow up Dr. Jeo at next scheduled visit.    Current knee pain not consistent with known gouty arthritis.  Continue allopurinol for gout. .     Patient with daughter and grandson today. Interpretor used for visit.    It was a pleasure seeing Stephen today.    Francisco Jesus DO, [facility]  Primary Care Sports Medicine        Again, thank you for allowing me to participate in the care of your patient.        Sincerely,        Francisco Jesus DO

## 2021-09-08 NOTE — TELEPHONE ENCOUNTER
Patient was just seen in clinic with Dr Jesus.  Writer notifed by TC who witnessed fall.  Per family, Patient walking down the [place] and tripped over pant leg.  Patient was getting up with assistance from family when writer approached scene. Questions translated by patients family.  Patient denies hitting head.  Patient denies being on blood thinners.   Patient reports that they hit their right elbow and right knee. Patient experiencing discomfort in right arm/shoulder & knee.   Patient able to move right arm/leg  Patient denies dizziness.  Patient okay to walk but family asked if they could use a wheelchair to bring patient to car.  Writer notified Dr. Jesus.   Writer advised patient/family to call clinic if any concerns.    Erwin Milton RN  [facility]      Reason for Disposition    Minor injury or bruising from direct blow    Additional Information    Negative: Major bleeding (actively dripping or spurting) that can't be stopped    Negative: Serious injury with multiple fractures (broken bones)    Negative: Sounds like a life-threatening emergency to the triager    Negative: Wound looks infected    Negative: Arm pain from overuse (e.g., sports, lifting, physical work)    Negative: Arm pain not from an injury    Negative: Shoulder injury is main concern    Negative: Elbow injury is main concern    Negative: Hand or wrist injury is main concern    Negative: Bullet wound, stabbed by knife, or other serious penetrating wound    Negative: Looks like a broken bone or dislocated joint (crooked or deformed)    Negative: Can't move injured arm at all    Negative: Bleeding won't stop after 10 minutes of direct pressure (using correct technique)    Negative: Skin is split open or gaping (or length > 1/2 inch or 12 mm)    Negative: Dirt in the wound and not removed after 15 minutes of scrubbing    Negative: Sounds like a serious injury to the triager    Negative: SEVERE pain    Negative: Can't move  injured arm normally (bend or straighten completely)    Negative: Large swelling or bruise and size > palm of person's hand    Negative: No prior tetanus shots (or is not fully vaccinated) and any wound (e.g., cut or scrape)    Negative: HIV positive or severe immunodeficiency (severely weak immune system) and DIRTY cut or scrape    Negative: Injury interferes with work or school    Negative: High-risk adult (e.g., age > 60, osteoporosis, chronic steroid use)    Negative: Suspicious history for the injury    Negative: Last tetanus shot > 5 years ago and DIRTY cut or scrape    Negative: Last tetanus shot > 10 years ago and CLEAN cut or scrape    Negative: Patient wants to be seen    Negative: Biceps muscle tear suspected (new bulge in upper arm area of biceps muscle, felt a pop)    Negative: Injury and pain has not improved after 3 days    Negative: Injury is still painful or swollen after 2 weeks    Protocols used: ARM INJURY-A-OH

## 2021-09-14 PROBLEM — R73.01 ELEVATED FASTING BLOOD SUGAR: Status: ACTIVE | Noted: 2021-01-01

## 2021-09-14 NOTE — PATIENT INSTRUCTIONS
You were seen in clinic today for left hand swelling and pain.   Take prednisone 40 mg daily for 5 days then take medrol dosepak according to the instructions.     Stop taking furosemide and fenofibrate.     Normal blood sugar values:  Before meals: 60 to 99  2 hours after meal: less than 140  A1c: less than 5.7%     Lab work: glucose and BMP

## 2021-09-14 NOTE — PROGRESS NOTES
Assessment & Plan     Acute idiopathic gout of left hand  Acute gout flare of left hand.  Discussed with patient to continue allopurinol 50 mg daily.  Take prednisone 40 mg daily until symptoms resolve.  Take Medrol Dosepak after symptoms have resolved.  - predniSONE (DELTASONE) 20 MG tablet; Take 2 tablets (40 mg) by mouth daily for 5 days  - methylPREDNISolone (MEDROL DOSEPAK) 4 MG tablet therapy pack; Follow Package Directions    Elevated fasting blood sugar  Will check glucose as family reports his blood glucose is higher when checked with glucose meter at home.  Last A1c is 5.5%.  Discussed with him normal values of blood glucose and fasting and after meals.      Elevated serum creatinine  Elevated creatinine and worsening GFR.  He has an appointment with nephrology next week.  Stop fenofibrate and furosemide.  Allopurinol decreased to 50 mg daily.  Ultrasound kidney ureter bladder has been normal.  - Basic metabolic panel  (Ca, Cl, CO2, Creat, Gluc, K, Na, BUN); Future  - Basic metabolic panel  (Ca, Cl, CO2, Creat, Gluc, K, Na, BUN)       See Patient Instructions    Return in about 4 weeks (around 10/12/2021) for Follow up, with me.    ORACIO GRANT MD  [facility] PAULA Mitchell is a 80 year old who presents for the following health issues  accompanied by his daughter-in-law and grandson:    HPI   Mr Valerio is a 80-year-old gentleman who presented to the clinic for follow-up.  He is accompanied by his daughter-in-law and grandson.   services were used.  He was recently seen by rheumatology and was started on methotrexate for RA.  Allopurinol was decreased to 50 mg daily because of his worsening kidney function.  He presents today with left hand swelling, redness and pain.  He states that pain started 2 weeks ago and it has not improved.  4 weeks ago he stopped taking prednisone.  He denies any fever or chills.  His daughter-in-law states that his blood sugars at home  have been very high.  Fasting and 200s and postprandial in 400s.  Last A1c was 5.5%.  He is not taking any diabetic medications right now.  He was diagnosed with diabetes in [place], per daughter in law.      Review of Systems   Constitutional: Negative for fever.            Objective    /76 (BP Location: Left arm, Cuff Size: Adult Regular)   Pulse 86   Temp 97.6  F (36.4  C) (Tympanic)   Resp 20   Wt 79.4 kg (175 lb)   SpO2 94%   BMI 26.61 kg/m    Body mass index is 26.61 kg/m .  Physical Exam  Vitals reviewed.   Musculoskeletal:      Comments: Bilateral hand deformity from chronic gout and RA.  Left hand swelling redness and tenderness on palpation

## 2021-09-15 NOTE — TELEPHONE ENCOUNTER
Pt was called with providers message below. Pt gave verbal approval for triage to speak with daughter in law Antonieta. She was given referral information. Both pt and Antonieta verbalized agreement with plan.

## 2021-09-15 NOTE — TELEPHONE ENCOUNTER
Please call Mr Valerio (interpretor needed) and let him know that his lab results show elevated blood sugars. I placed a referral for diabetic education specifically for continuous glucose monitoring.     If they don't get a call from diabetes educator in 2 days, they should call [phone] for [facility] or [phone] for [facility].

## 2021-09-16 NOTE — TELEPHONE ENCOUNTER
Diabetes Education Scheduling Outreach #1:    Call to patient to schedule. Left message with phone number to call to schedule.    Plan for 2nd outreach attempt within 1 week.    Rita Corona  [facility]  Diabetes and Nutrition Scheduling

## 2021-09-23 NOTE — TELEPHONE ENCOUNTER
RECORDS RECEIVED FROM: Internal   DATE RECEIVED: 09.23.2021   NOTES STATUS DETAILS   OFFICE NOTE from referring provider Internal 08.16.2021 Juliet Srinivasan MD   OFFICE NOTE from other specialist  N/A    *Only VASCULITIS or LUPUS gather office notes for the following N/A    *PULMONARY   N/A    *ENT N/A    *DERMATOLOGY N/A    *RHEUMATOLOGY N/A    DISCHARGE SUMMARY from hospital N/A    DISCHARGE REPORT from the ER N/A    MEDICATION LIST Internal    IMAGING  (NEED IMAGES AND REPORTS)     KIDNEY CT SCAN N/A    KIDNEY ULTRASOUND Internal 08.17.2021 US RENAL COMPLETE    MR ABDOMEN N/A    NUCLEAR MEDICINE RENAL N/A    LABS     CBC Internal 08.12.2021   CMP Internal 08.12.2021   BMP Internal 09.14.2021   UA N/A    URINE PROTEIN N/A    RENAL PANEL N/A    BIOPSY     KIDNEY BIOPSY  N/A

## 2021-09-27 NOTE — TELEPHONE ENCOUNTER
Received a message from patient (patient's grandson) about elevated fasting blood sugars. He had an A1c of 5.5% and Glucose levels of .   He was initially on metformin but I discontinued it due to worsening kidney function.  I will be starting him on Jardiance 10 mg daily and checking BMP at 2 weeks. Also referring to Endocrine for labile blood sugar levels.     Please call the patient and let them know about new medication, lab work in 2 weeks and referral.  With the new medication they should keep themselves hydrated.

## 2021-09-27 NOTE — TELEPHONE ENCOUNTER
Called patient via  - left a message on BOTH home and mobile numbers     Also sent a [employer] message to patient     Rita WOODS RN

## 2021-09-28 NOTE — TELEPHONE ENCOUNTER
Patient ready mychart on 9/27/2021 at 332 pm.  Signing encounter.     Erwin Milton RN  [facility]

## 2021-09-29 NOTE — PROGRESS NOTES
Rheumatology Clinic Visit     Stephen Valerio MRN# [id number]   YOB: 1940 Age: 80 year old     Date of Visit: 09/30/2021  Primary care provider: Juliet Srinivasan          Assessment and Plan:     # Rheumatoid arthritis (upper extremity polyarthritis, RF+, CCP-): Patient (through ) relates worsening pain and swelling in hands, wrists, and knees in the past week.  Escalating symptoms correlate with discontinuation of a second course of prednisone 10 days ago.  Physical examination shows tenderness, swelling, and synovial thickening at multiple MCPs in both wrists.    Laboratory evaluation on September 28, 2021 showed normal basic metabolic panel with creatinine 0.89, significantly improved from August.  Albumin was 3.1, down from 3.8; iron saturation index was 30.  Urine protein was 0.26.  CBC showed hemoglobin 12.9, improved from August 2021.  Urinalysis was clear.  Repeat uric acid is 5.7.    Hand x-rays showed joint space loss in multiple MCP joints.  Degenerative changes noted in multiple DIPs.  Advanced radiocarpal joint degenerative changes in both wrists.  Subtle erosive changes at multiple PIPs.    Inflammatory polyarthritis is flaring. Rheumatoid factor is positive, and radiographs are consistent with longstanding, destructive inflammatory arthritis.   Methotrexate has been used for 3 weeks at 15 mg weekly.  I do not think the medication has had sufficient time to exert maximal positive effect on seropositive inflammatory arthritis.  The drug has been well-tolerated.  I recommend increasing the dose to 20 mg weekly.  A second potential contributor to episodic flaring inflammatory arthritis is gouty arthropathy.  Uric acid is improved, but I recommend a serum uric acid target of less than 5 mg/dL..  I recommend increasing allopurinol dose, noting that renal function has continued to improve.  Finally I recommend restarting prednisone temporarily to cover patient through the  early period of allopurinol dose adjustment and methotrexate escalation.  If inflammatory symptoms are not sufficiently suppressed with methotrexate monotherapy, I will recommend addition of tumor necrosis factor inhibitor therapy or consider monotherapy with tofacitinib or abatacept.    # L > R knee pain: I appreciate Dr. Jesus's evaluation in sports medicine in September 2021.  I agree with the impression of severe medial compartment left greater than right knee arthritis.  Patient has been referred to operative orthopedics.    # Rising creatinine:  Renal ultrasound on August 17 showed normal kidneys.  Creatinine had fallen to within the normal range as of September 28.  Plan to avoid nonsteroidals, and use agents with potential nephrotoxicity with great caution.    #Gout: Urate lowering therapy associated flares may be contributing to current picture.  I recommend continuing allopurinol, but at higher dose to achieve serum uric acid titer target of 5 mg/dL.  If oligo articular abrupt onset inflammatory arthritis recurs, joint aspiration for crystal examination will help clarify contribution of gouty arthropathy to current picture.    Plan:  1.  Continue treatment for arthritis and gout  2.  Restart prednisone 5 mg twice daily for 3 weeks.  3.  Continue allopurinol , at100 mg daily.  Take medicine every day in order to suppress the blood substance that causes gout (uric acid).  4.  Increase methotrexate to 8 tablets (20 mg) taken altogether once a week. While on methotrexate:   -- Check labs every 3 months (AST/ALT, Albumin, CBC with platelets)   -- Limit alcohol intake to 2 drinks weekly; use folate 1 mg daily.  --Tylenol 500-1000 mg can be used as needed up to three times daily for nausea/headache associated with methotrexate  5.  Kidney doctor referral can be safely deferred until January 2022  6.  Follow-up with orthopedics for left knee treatment.    RTC 6 weeks    Shayne Joe MD  Staff Rheumatologist, [facility]              Active Problem List:     Patient Active Problem List    Diagnosis Date Noted     Elevated fasting blood sugar 09/14/2021     Priority: Medium     Rheumatoid arthritis (H) 08/16/2021     Priority: Medium     Elevated serum creatinine 08/16/2021     Priority: Medium     Annual physical exam 08/05/2021     Priority: Medium     Need for hepatitis C screening test 08/05/2021     Priority: Medium     Hyperlipidemia LDL goal <130 04/08/2014     Priority: Medium     CARDIOVASCULAR SCREENING; LDL GOAL LESS THAN 130 03/26/2014     Priority: Medium     Esophageal reflux 02/20/2014     Priority: Medium     Acute gouty arthritis 02/19/2014     Priority: Medium     Physical deconditioning 02/18/2014     Priority: Medium     Anemia 02/18/2014     Priority: Medium     Hyponatremia 02/18/2014     Priority: Medium     Gout      Priority: Medium     Chronic back pain      Priority: Medium            History of Present Illness:   Stephen Valerio presents for follow up of arthritis.  He was last seen 8-, when inflammatory arthritis was significantly improved after course of prednisone.  Recommendation was to complete a several week course of prednisone, and to start methotrexate for a long term management of seropositive rheumatoid arthritis.  Treatment for gout was continued in tandem.    Interval history 09-:    He was seen by Dr. Srinivasan 1 week ago for recurrent right hand swelling.  Diagnosis was of acute arthritis, likely gouty.  A Medrol Dosepak was recommended.    He was seen by Dr. Jesus in sports medicine in early September.  Impression was of severe left osteoarthritis of the knee with medial compartment narrowing.  Referral to surgical orthopedics was made.  Through .  Today, hand pain is reduced, but there is still swelling in fingers.   He had 2 episodes of gout flare in both ankles and both wrists in past 3 weeks. Pain lasted 3-4 days with each episodes.    He was dx'd  with diabetes but has not started medication.  He is concerned that prednisone was associated with improved joitn pain, but other medications have not suppressed pain. He stopped prednisone after taking it for about 1 month, then had more pain after the medrol dose pack.    Interval history 08-:  Visit is conducted in the presence of the patient's daughter-in-law, and of his grandson, who provides Mongolian interpretation.     Patient reports that after starting prednisone several weeks ago, he had marked improvement in swelling, stiffness, and pain in his wrists and hands.  His left knee is still unstable and gives pain with walking on even surfaces.  He tolerated prednisone well.  He has had no fevers chills sweats skin rash cough shortness of breath chest pain.           Review of Systems:       Constitutional: negative  Skin: negative  Eyes: negative  Ears/Nose/Throat: negative  Respiratory: No shortness of breath, dyspnea on exertion, cough, or hemoptysis  Cardiovascular: negative  Gastrointestinal: negative  Genitourinary: negative  Musculoskeletal: HPI  Neurologic: negative  Psychiatric: negative  Hematologic/Lymphatic/Immunologic: negative  Endocrine: negative          Past Medical History:     Past Medical History:   Diagnosis Date     Arthritis      Chronic back pain      Gout      Hyperlipidemia LDL goal <130 4/8/2014     Past Surgical History:   Procedure Laterality Date     IRRIGATION AND DEBRIDEMENT HAND, COMBINED  2/11/2014    Procedure: COMBINED IRRIGATION AND DEBRIDEMENT HAND;  I&D Left Wrist;  Surgeon: Randy Perrin MD;  Location: [facility] OR     #Degenerative arthritis of the knees, diagnosed before 2015  # Gout: Status post left wrist irrigation and debridement in 2014 for acute arthritis; culture negative, presumed gout. Allopurinol started 8-2021  #Rheumatoid arthritis: Chronic, symmetric, upper extremity predominant, prednisone-sensitive polyarthritis of wrists and hands, first recognized  "in July 2021. RF+, ACPA-negative.  Radiographs showed PIP erosions and joint space narrowing at MCPs.  Patient started on methotrexate August 2021.         Social History:     Social History     Occupational History     Not on file   Tobacco Use     Smoking status: Former Smoker     Smokeless tobacco: Never Used   Substance and Sexual Activity     Alcohol use: No     Drug use: No     Sexual activity: Never     Patient used to smoke but quit long time ago, does not take alcohol, he has mild hearing problem where family has to speak louder, he eats by himself uses bathroom by himself, his memory is okay per family however he does forget small details.        Family History:     Family History   Problem Relation Age of Onset     Diabetes Brother             Allergies:   No Known Allergies         Medications:     Current Outpatient Medications   Medication Sig Dispense Refill     allopurinol (ZYLOPRIM) 100 MG tablet Take 1 tablet (100 mg) by mouth daily 30 tablet 3     empagliflozin (JARDIANCE) 10 MG TABS tablet Take 1 tablet (10 mg) by mouth daily 30 tablet 0     methotrexate 2.5 MG tablet Take 8 tablets (20 mg) by mouth every 7 days 32 tablet 2     methylPREDNISolone (MEDROL DOSEPAK) 4 MG tablet therapy pack Follow Package Directions 21 tablet 0     predniSONE (DELTASONE) 5 MG tablet Take 1 tablet (5 mg) by mouth daily Take 1 tablet twice a day for 3 weeks 42 tablet 1            Physical Exam:   Blood pressure 119/72, pulse 104, height 1.727 m (5' 8\"), SpO2 95 %.  Wt Readings from Last 6 Encounters:   09/14/21 79.4 kg (175 lb)   09/08/21 79.2 kg (174 lb 9.6 oz)   08/19/21 76.2 kg (168 lb)   08/16/21 76.3 kg (168 lb 3.2 oz)   08/12/21 77.3 kg (170 lb 8 oz)   08/05/21 77.6 kg (171 lb)       Constitutional: well-developed, appearing stated age; cooperative  Eyes: nl EOM, PERRLA, vision, conjunctiva, sclera  ENT: nl external ears, nose, hearing, lips, teeth, gums, throat  No mucous membrane lesions, normal saliva " pool  Neck: no mass or thyroid enlargement  Resp: breathing unlabored  Lymph: no cervical, supraclavicular, inguinal or epitrochlear nodes  MS: Synovial thickening present over both wrists with reduced flexion and extension left greater than right;+synovial thickening and tenderness at multiple MCPs and PIPs.  Fist formation improved.  Nontender mobile mass present over left olecranon process.  Severe left knee varus; no effusion or joint line tenderness.  Skin: no nail pitting, alopecia, rash, nodules or lesions;  visible masses at bunion bursae, prepatellar bursa, or in the pinnae.  Neuro: nl cranial nerves, strength, sensation, DTRs.   Psych: nl judgement, orientation, memory, affect.         Data:     @  RHEUM RESULTS Latest Ref Rng & Units 2/11/2014 2/12/2014 2/13/2014   ALBUMIN 3.4 - 5.0 g/dL 3.3 - -   ALT 0 - 70 U/L 53 - -   AST 0 - 45 U/L 31 - -   CREATININE 0.66 - 1.25 mg/dL 0.58(L) 0.55(L) 0.55(L)   CRP 0.0 - 8.0 mg/L - - -   GFR ESTIMATE, IF BLACK >60 mL/min/1.7m2 >90 >90 >90   GFR ESTIMATE >60 mL/min/1.73m2 >90 >90 >90   HEMATOCRIT 40.0 - 53.0 % 33.1(L) 29.9(L) 29.2(L)   HEMOGLOBIN 13.3 - 17.7 g/dL 11.3(L) 10.0(L) 9.9(L)   HCVAB Nonreactive - - -   WBC 4.0 - 11.0 10e3/uL 7.9 9.1 9.1   RBC 4.40 - 5.90 10e6/uL 3.37(L) 3.05(L) 2.99(L)   RDW 10.0 - 15.0 % 13.2 13.3 13.0   MCHC 31.5 - 36.5 g/dL 34.1 33.4 33.9   MCV 78 - 100 fL 98 98 98   PLATELET COUNT 150 - 450 10e3/uL 259 238 276   RHEUMATOID FACTOR <20 IU/mL - - -   ESR 0 - 20 mm/hr 94(H) - -        ,  ,  ,  ,  ,  ,  ,  ,  ,  ,  ,  ,  ,  ,  ,  ,  ,  ,  ,  ,  ,  ,  ,  ,  ,  ,  ,  ,  ,  ,  ,  ,  ,  ,  ,   Albumin Fraction   Date Value Ref Range Status   03/18/2014 3.6 (L) 3.7 - 5.1 g/dL Final     Alpha 2 Fraction   Date Value Ref Range Status   03/18/2014 0.7 0.5 - 0.9 g/dL Final     Beta Fraction   Date Value Ref Range Status   03/18/2014 0.8 0.6 - 1.0 g/dL Final     Gamma Fraction   Date Value Ref Range Status   03/18/2014 1.3 0.7 - 1.6 g/dL Final      Monoclonal Peak   Date Value Ref Range Status   03/18/2014 0.0 0.0 g/dL Final     ELP Interpretation:   Date Value Ref Range Status   03/18/2014   Final    Essentially normal electrophoretic pattern.  No monoclonal protein seen.   Pathologic significance requires clinical correlation.  Fozia Torres M.D., Ph.D.     ,  ,  ,  ,  ,  ,  ,  ,  ,       Reviewed Rheumatology lab flowsheet

## 2021-09-29 NOTE — TELEPHONE ENCOUNTER
[facility]    Phone Message    May a detailed message be left on voicemail: yes     Reason for Call: Order(s): Other:   Reason for requested: Lab Orders  Date needed: Patient is scheduled on 1/13//22  Provider name: Dr. Velasquez    Patient had labs completed on 9/28/21 but canceled appointment with Dr. Velasquez for 9/23/21, please call patient daughter in law Antonieta at [phone] to advise if more labs are needed.     Action Taken: Message routed to:  [facility] ([facility] FELIX Hurt    Travel Screening: Not Applicable

## 2021-09-30 NOTE — NURSING NOTE
"Chief Complaint   Patient presents with     RECHECK       Vitals:    09/30/21 1223   BP: 119/72   BP Location: Left arm   Patient Position: Sitting   Cuff Size: Adult Regular   Pulse: 104   SpO2: 95%   Height: 1.727 m (5' 8\")       Body mass index is 26.61 kg/m .     Stephany Frederick MA  "

## 2021-09-30 NOTE — PATIENT INSTRUCTIONS
Diagnosis:  1. Rheumatoid arthritis: flaring arthritis now. Plan increase methotrexate and restart prednisone.  2. Osteoarthritis, left more so than right knee  3. Probable gout: allopurinol will help reduce frequency of gout attacks over time (months)    Plan:    1.  Continue treatment for arthritis and gout  2.  Restart prednisone 5 mg twice daily for 3 weeks.  3.  Continue allopurinol ,100 mg daily.  Take medicine every day in order to suppress the blood substance that causes gout (uric acid).  4.  Increase methotrexate to 8 tablets (20 mg) taken altogether once a week. While on methotrexate:   -- Check labs every 3 months (AST/ALT, Albumin, CBC with platelets)   -- Limit alcohol intake to 2 drinks weekly; use folate 1 mg daily.  --Tylenol 500-1000 mg can be used as needed up to three times daily for nausea/headache associated with methotrexate  5.  Kidney doctor referral can be safely deferred until January 2022  6.  Follow-up with orthopedics for left knee treatment.

## 2021-10-05 NOTE — PROGRESS NOTES
{PROVIDER CHARTING PREFERENCE:223132}    Jamil Mitchell is a 81 year old who presents for the following health issues {ACCOMPANIED BY STATEMENT (Optional):738414}    HPI     2 month f/u     {additonal problems for provider to add (Optional):231258}    Review of Systems   {ROS COMP (Optional):921831}      Objective    There were no vitals taken for this visit.  There is no height or weight on file to calculate BMI.  Physical Exam   {Exam List (Optional):751870}    {Diagnostic Test Results (Optional):941677}    {AMBULATORY ATTESTATION (Optional):263476}

## 2021-10-05 NOTE — PATIENT INSTRUCTIONS
Avoid aspirin 7 days before the surgery. Avoid nonsteroidal anti-inflammatory pain medication like ibuprofen, Motrin, or Aleve 7 days before the surgery.  Tylenol can be used for pain.  Avoid any over the counter multivitamins or herbal supplement 7 days before surgery   You can resume these medications after surgery    HOLD Jardiance 3 days before the surgery and resume after  Continue to take methotrexate, prednisone and allopurinol.

## 2021-10-05 NOTE — PROGRESS NOTES
[address], [address]  [facility] [address]  Phone: [phone]  Primary Provider: Juliet Srinivasan  Pre-op Performing Provider:    JULIET SRINIVASAN         PREOPERATIVE EVALUATION:  Today's date: 10/5/2021    Stephen was seen today for recheck and pre-op exam.    Diagnoses and all orders for this visit:    Preoperative examination  Approval given for a low risk procedure.  Patient has low risk of cardiac events.  Medication whole times: Hold Jardiance 3 days before the surgery and continue after the procedure.  Continue to take methotrexate, allopurinol and prednisone as prescribed.  Although CBC was checked last week, white blood cell count was mildly elevated.  Will check electrolytes kidney function glucose and CBC.  Discussed with the patient to check blood sugar levels at home while Jardiance is on hold and if blood sugar is high consistently then patient's daughter-in-law can send me a message.  -     Basic metabolic panel  (Ca, Cl, CO2, Creat, Gluc, K, Na, BUN); Future  -     CBC with platelets; Future  -     Basic metabolic panel  (Ca, Cl, CO2, Creat, Gluc, K, Na, BUN)    Diabetes  Rheumatoid arthritis  Gout   CKD-3    Stephen Valerio is a 81 year old male who presents for a preoperative evaluation.    Surgical Information:  Surgery/Procedure: Cataract removal  Surgery Location: [facility]  Surgeon: Dr. Clark  Surgery Date: 10/26/2021  Time of Surgery: TBD  Where patient plans to recover: At home with family  Fax number for surgical facility: [phone]    Type of Anesthesia Anticipated: General    1. No - Have you ever had a heart attack or stroke?  2. No - Have you ever had surgery on your heart or blood vessels, such as a stent, coronary (heart) bypass, or surgery on an artery in the head, neck, heart, or legs?  3. No - Do you have chest pain when you are physically active?  4. No - Do you have a history of heart failure?  5. No - Do you currently  have a cold, bronchitis, or symptoms of other respiratory (head and chest) infections?  6. No - Do you have a cough, shortness of breath, or wheezing?  7. No - Do you or anyone in your family have a history of blood clots?  8. No - Do you or anyone in your family have a serious bleeding problem, such as long-lasting bleeding after surgeries or cuts?  9. No - Have you ever had anemia or been told to take iron pills?  10. No - Have you had any abnormal blood loss such as black, tarry or bloody stools, or abnormal vaginal bleeding?  11. No - Have you ever had a blood transfusion?  12. Yes - Are you willing to have a blood transfusion if it is medically needed before, during, or after your surgery?  13. No - Have you or anyone in your family ever had problems with anesthesia (sedation for surgery)?  14. No - Do you have sleep apnea, excessive snoring, or daytime drowsiness?   15. No - Do you have any artifical heart valves or other implanted medical devices, such as a pacemaker, defibrillator, or continuous glucose monitor?  16. No - Do you have any artifical joints?  17. No - Are you allergic to latex?  18. No - Is there any chance that you may be pregnant?    Subjective     HPI related to upcoming procedure:   Mr Stephen Solorio is a 81-year-old male who presented to the clinic for preop exam.  He has a past medical history of diabetes, A1c has been normal, random blood sugar levels are either too low or too high and he has been on Jardiance 10 mg daily.  He also has history of severe gout and rheumatoid arthritis.  He takes methotrexate 20 mg weekly and allopurinol 100 mg daily along with prednisone 5 mg twice daily which she will continue for 3 weeks per his rheumatologist.  He will be seen endocrinologist for his diabetes.  He is getting cataract surgery on 10/26.  He denies chest pain, palpitations, shortness of breath, headache, syncope.  He does not have a history of stroke or TIA.  He does not have a history of  heart failure.  He is able to climb 1 flight of stairs without feeling short of breath or having chest pain.  He does not have any history of complications with anesthesia.  He is a former smoker quit 10 years ago.  Does not drink alcohol.      Health Care Directive:  Patient does not have a Health Care Directive or Living Will:    Preoperative Review of :   reviewed - no record of controlled substances prescribed.      Review of Systems   Constitutional: Negative for chills and fatigue.   Respiratory: Negative for chest tightness and shortness of breath.    Cardiovascular: Negative for palpitations.   Neurological: Negative for syncope, light-headedness and headaches.   All other systems reviewed and are negative.        Patient Active Problem List    Diagnosis Date Noted     Elevated fasting blood sugar 09/14/2021     Priority: Medium     Rheumatoid arthritis (H) 08/16/2021     Priority: Medium     Elevated serum creatinine 08/16/2021     Priority: Medium     Annual physical exam 08/05/2021     Priority: Medium     Need for hepatitis C screening test 08/05/2021     Priority: Medium     Hyperlipidemia LDL goal <130 04/08/2014     Priority: Medium     CARDIOVASCULAR SCREENING; LDL GOAL LESS THAN 130 03/26/2014     Priority: Medium     Esophageal reflux 02/20/2014     Priority: Medium     Acute gouty arthritis 02/19/2014     Priority: Medium     Physical deconditioning 02/18/2014     Priority: Medium     Anemia 02/18/2014     Priority: Medium     Hyponatremia 02/18/2014     Priority: Medium     Gout      Priority: Medium     Chronic back pain      Priority: Medium      Past Medical History:   Diagnosis Date     Arthritis      Chronic back pain      Gout      Hyperlipidemia LDL goal <130 4/8/2014     Past Surgical History:   Procedure Laterality Date     IRRIGATION AND DEBRIDEMENT HAND, COMBINED  2/11/2014    Procedure: COMBINED IRRIGATION AND DEBRIDEMENT HAND;  I&D Left Wrist;  Surgeon: Randy Perrin MD;   Location: [facility] OR     Current Outpatient Medications   Medication Sig Dispense Refill     allopurinol (ZYLOPRIM) 100 MG tablet Take 1 tablet (100 mg) by mouth daily 30 tablet 3     empagliflozin (JARDIANCE) 10 MG TABS tablet Take 1 tablet (10 mg) by mouth daily 30 tablet 0     methotrexate 2.5 MG tablet Take 8 tablets (20 mg) by mouth every 7 days 32 tablet 2     predniSONE (DELTASONE) 5 MG tablet Take 1 tablet (5 mg) by mouth daily Take 1 tablet twice a day for 3 weeks 42 tablet 1       No Known Allergies     Social History     Tobacco Use     Smoking status: Former Smoker     Smokeless tobacco: Never Used   Substance Use Topics     Alcohol use: No     History   Drug Use No         Objective     /75 (BP Location: Right arm, Cuff Size: Adult Regular)   Pulse 89   Temp 97.6  F (36.4  C) (Tympanic)   Wt 73.5 kg (162 lb)   SpO2 93%   BMI 24.63 kg/m      Physical Exam  Vitals reviewed.   Constitutional:       Appearance: Normal appearance.   Cardiovascular:      Rate and Rhythm: Normal rate and regular rhythm.      Heart sounds: Normal heart sounds. No murmur heard.   No gallop.    Pulmonary:      Effort: Pulmonary effort is normal. No respiratory distress.      Breath sounds: Normal breath sounds. No wheezing or rales.   Neurological:      Mental Status: He is alert.         Recent Labs   Lab Test 09/28/21  1735 09/14/21  1048 08/12/21  1125 08/12/21  1125 08/05/21  0929 08/05/21  0929   HGB 12.9*  --   --  11.7*   < > 12.4*     --   --  437   < > 440    135   < > 138   < > 139   POTASSIUM 4.1 4.4   < > 4.3   < > 4.6   CR 0.89 1.17   < > 2.29*   < > 1.55*   A1C  --   --   --   --   --  5.5    < > = values in this interval not displayed.        Diagnostics:    Revised Cardiac Risk Index (RCRI):  The patient has the following serious cardiovascular risks for perioperative complications:   - No serious cardiac risks = 0 points     RCRI Interpretation: 0 points: Class I (very low risk - 0.4%  complication rate)     Signed Electronically by: ORACIO GRANT MD  Copy of this evaluation report is provided to requesting physician.

## 2021-10-12 PROBLEM — E11.69 TYPE 2 DIABETES MELLITUS WITH OTHER SPECIFIED COMPLICATION, WITHOUT LONG-TERM CURRENT USE OF INSULIN (H): Status: ACTIVE | Noted: 2021-01-01

## 2021-10-12 NOTE — TELEPHONE ENCOUNTER
Jardiance 10 mg tablets 2 tablets daily is not covered by insurance. Insurance has quantity limit of 34 tablets per 30 days. Jardiance 25 mg is available at quantity limit of 34 tablets per 30 days as well if you would prefer to switch patient to 25 mg daily.    Fermin Cristobal, JD on 10/12/2021 at 5:43 PM

## 2021-10-13 NOTE — TELEPHONE ENCOUNTER
Continue with jardiance 10 mg  Please ask them to Follow up in early November, will check A1c at that time.

## 2021-10-15 NOTE — TELEPHONE ENCOUNTER
Patient is scheduled for surgery with Dr. Kruger    Spoke with: LORI Silva - scheduled with patient in clinic    Date of Surgery: 11/1/2021    Location: [facility]    Informed patient they will need an adult  yesn    Pre op with Provider n/a    H&P: Scheduled with PAC on 10/20/2021    Pre-procedure COVID-19 Test: [place] on 10/28/2021    Additional imaging/appointments: n/a    Surgery packet: Given in clinic     Additional comments: n/a

## 2021-10-15 NOTE — PROGRESS NOTES
This 81-year-old man has noticed years of left greater than right knee pain.  In the distant past he had some injections.  He is having daily pain with walking and sedentary activities.  The pain is limited to his knees.  He would like to do something to relieve the pain.    On examination he is alert oriented has a normal mood and affect and is in no acute distress.  Respirations are regular and unlabored eyes are nonicteric.  He has a varus deformities of both his knees with swelling in both sides.  The patient is relatively thin and there is no edema or erythema in his lower extremities.  He does not have any distal edema in the left lower extremity.  He has some mild joint line tenderness on the left side.  He does have a slight flexion contracture but he is able to flex past 90 degrees on that left side.    X-rays show the absence of cartilage in the medial compartments of both his knees with subchondral sclerosis subchondral cysts and osteophytes bilaterally on the medial side as well.  This patient has severe arthritis.    We discussed total knee replacement and the risks of bleeding infection pain numbness tingling weakness deep venous thrombosis and pulmonary embolism.  He knows that he will need to take a blood thinner for 4 weeks after surgery and that physical therapy for about 2 months is really essential on obtaining the desired result of a knee with minimal pain and good arc of motion with the leg has good strength.  The patient family are interested in going forward with knee replacement.  I have answered all her questions today.

## 2021-10-15 NOTE — NURSING NOTE
Teaching Flowsheet   Relevant Diagnosis: left total knee  Teaching Topic: preop     Person(s) involved in teaching:   Patient, Daughter and Grandson     Motivation Level:  Asks Questions: Yes  Eager to Learn: Yes  Cooperative: Yes  Receptive (willing/able to accept information): Yes  Any cultural factors/Gnosticist beliefs that may influence understanding or compliance? No  Kyrgyz speaking patient     Patient and Family demonstrates understanding of the following:  Reason for the appointment, diagnosis and treatment plan: Yes  Knowledge of proper use of medications and conditions for which they are ordered (with special attention to potential side effects or drug interactions): Yes  Which situations necessitate calling provider and whom to contact: Yes       Teaching Concerns Addressed:        Proper use and care of soap (medical equip, care aids, etc.): Yes  Nutritional needs and diet plan: Yes  Pain management techniques: Yes  Wound Care: No, they will show up on discharge instructions  How and/when to access community resources: Yes     Instructional Materials Used/Given: preop total joint packet reviewed patient, his daughter and his grandson.  He lives with his family, so he has a large support group.  All contents reviewed, PAC and covid scheduled.   They are aware of  to view jt video and be at discharge.  They have no other questions at this time

## 2021-10-15 NOTE — LETTER
10/15/2021         RE: Stephen Valerio  [address] [facility] [address]        Dear Colleague,    Thank you for referring your patient, Stephen Valerio, to the [facility] [place]. Please see a copy of my visit note below.    This 81-year-old man has noticed years of left greater than right knee pain.  In the distant past he had some injections.  He is having daily pain with walking and sedentary activities.  The pain is limited to his knees.  He would like to do something to relieve the pain.    On examination he is alert oriented has a normal mood and affect and is in no acute distress.  Respirations are regular and unlabored eyes are nonicteric.  He has a varus deformities of both his knees with swelling in both sides.  The patient is relatively thin and there is no edema or erythema in his lower extremities.  He does not have any distal edema in the left lower extremity.  He has some mild joint line tenderness on the left side.  He does have a slight flexion contracture but he is able to flex past 90 degrees on that left side.    X-rays show the absence of cartilage in the medial compartments of both his knees with subchondral sclerosis subchondral cysts and osteophytes bilaterally on the medial side as well.  This patient has severe arthritis.    We discussed total knee replacement and the risks of bleeding infection pain numbness tingling weakness deep venous thrombosis and pulmonary embolism.  He knows that he will need to take a blood thinner for 4 weeks after surgery and that physical therapy for about 2 months is really essential on obtaining the desired result of a knee with minimal pain and good arc of motion with the leg has good strength.  The patient family are interested in going forward with knee replacement.  I have answered all her questions today.    Sincerely,    Hermes Kruger MD

## 2021-10-15 NOTE — PROGRESS NOTES
"    [facility], Orthopaedic Oncology and Reconstruction Surgery Consultation    Stephen Valerio MRN# [id number]   Age: 81 year old YOB: 1940     Requesting physician: Juliet Alvarez            Assessment and Plan:   Assessment:  ***     Plan:  ***    Patient seen, evaluated and discussed with Dr. Kruger who is in agreement with the above assessment and plan.    Alfredo Truong MD  Orthopaedic Surgery PGY4  Pager: [phone]    Hermes Kruger MD  Oncology and Adult Reconstructive Surgery  Dept Orthopaedic Surgery, [facility]  [email or web address]             History of Present Illness:   81 year old male  chief complaint    Current symptoms:  Problem: ***  Onset and duration: ***  Awakens from sleep due to sx's:  {YES / NO:638530:a:\"Yes\"}  Precipitating Injury:  {YES / NO:992814:a:\"Yes\"}    Other joints or sites painful:  {YES / NO:903030:a:\"Yes\"}  Fever: {YES / NO:414332:a:\"Yes\"}  Appetite change or weight loss: {YES / NO:572604:a:\"Yes\"}    Background history:  DX:  1. ***    TREATMENTS:  1. *** date, treatment, (surgeon), hospital             Physical Exam:     EXAMINATION pertinent findings:   PSYCH: Pleasant, healthy-appearing, alert, oriented x3, cooperative. Normal mood and affect.  VITAL SIGNS: Height 1.73 m (5' 8.11\"), weight 72.6 kg (160 lb)..  Reviewed nursing intake notes.   Body mass index is 24.25 kg/m .  RESP: non labored breathing   ABD: benign, soft, non-tender, no acute peritoneal findings  SKIN: grossly normal   LYMPHATIC: grossly normal, no adenopathy, no extremity edema  NEURO: grossly normal , no motor deficits  VASCULAR: satisfactory perfusion of all extremities   MUSCULOSKELETAL:   Gait: ***    *** hip:  Skin intact, no prior incisions.   No lower extremity wounds or lymphedema.  Nontender over the greater trochanter.  Hip ROM painful: ***   Abd: ***   Add: ***   Flexion: ***   IR: ***   ER: ***  Pain with logroll: ***  Stinchfield test: " ***  Cristian test: ***  Trendelenburg sign: ***    *** knee:     No deformity, skin intact.  No lower extremity wounds or lymphedema.  Prior surgical incision: ***  Effusion or swelling of the knee: ***  Tenderness to palpation: ***  ROM: *** to ***  Pain with knee ROM: ***  Crepitance with knee ROM: ***  Extensor lag: ***  MCL stability: Stable  Lateral Stability: Stable  Lachman: 1A  Anterior drawer: stable  Posterior drawer: stable    SILT sp/dp/tibial/saph/sural nerves.  Motor intact distally TA/GSC/EHL/FHL with 5/5 strength.    DP/PT pulses palpable, 2+, toes warm and well perfused.              Data:   All laboratory data reviewed  All imaging studies reviewed by me          DATA for DOCUMENTATION:         Past Medical History:     Patient Active Problem List   Diagnosis     Physical deconditioning     Anemia     Hyponatremia     Gout     Chronic back pain     Acute gouty arthritis     Esophageal reflux     CARDIOVASCULAR SCREENING; LDL GOAL LESS THAN 130     Hyperlipidemia LDL goal <130     Annual physical exam     Need for hepatitis C screening test     Rheumatoid arthritis (H)     Elevated serum creatinine     Elevated fasting blood sugar     Type 2 diabetes mellitus with other specified complication, without long-term current use of insulin (H)     Past Medical History:   Diagnosis Date     Arthritis      Chronic back pain      Gout      Hyperlipidemia LDL goal <130 4/8/2014       Also see scanned health assessment forms.       Past Surgical History:     Past Surgical History:   Procedure Laterality Date     IRRIGATION AND DEBRIDEMENT HAND, COMBINED  2/11/2014    Procedure: COMBINED IRRIGATION AND DEBRIDEMENT HAND;  I&D Left Wrist;  Surgeon: Randy Perrin MD;  Location:  OR            Social History:     Social History     Socioeconomic History     Marital status:      Spouse name: Not on file     Number of children: Not on file     Years of education: Not on file     Highest education level:  Not on file   Occupational History     Not on file   Tobacco Use     Smoking status: Former Smoker     Smokeless tobacco: Never Used   Substance and Sexual Activity     Alcohol use: No     Drug use: No     Sexual activity: Never   Other Topics Concern     Parent/sibling w/ CABG, MI or angioplasty before 65F 55M? Not Asked   Social History Narrative     Not on file     Social Determinants of Health     Financial Resource Strain:      Difficulty of Paying Living Expenses:    Food Insecurity:      Worried About Running Out of Food in the Last Year:      Ran Out of Food in the Last Year:    Transportation Needs:      Lack of Transportation (Medical):      Lack of Transportation (Non-Medical):    Physical Activity:      Days of Exercise per Week:      Minutes of Exercise per Session:    Stress:      Feeling of Stress :    Social Connections:      Frequency of Communication with Friends and Family:      Frequency of Social Gatherings with Friends and Family:      Attends Restoration Services:      Active Member of Clubs or Organizations:      Attends Club or Organization Meetings:      Marital Status:    Intimate Partner Violence:      Fear of Current or Ex-Partner:      Emotionally Abused:      Physically Abused:      Sexually Abused:             Family History:       Family History   Problem Relation Age of Onset     Diabetes Brother             Medications:     Current Outpatient Medications   Medication Sig     allopurinol (ZYLOPRIM) 300 MG tablet Take 1 tablet (300 mg) by mouth daily     empagliflozin (JARDIANCE) 10 MG TABS tablet Take 2 tablets (20 mg) by mouth daily     methotrexate 2.5 MG tablet Take 8 tablets (20 mg) by mouth every 7 days     predniSONE (DELTASONE) 5 MG tablet Take 1 tablet (5 mg) by mouth daily Take 1 tablet twice a day for 3 weeks     No current facility-administered medications for this visit.              Review of Systems:   A comprehensive 10 point review of systems (constitutional, ENT,  cardiac, peripheral vascular, lymphatic, respiratory, GI, , Musculoskeletal, skin, Neurological) was performed and found to be negative except as described in this note.     See intake form completed by patient

## 2021-10-15 NOTE — NURSING NOTE
"Reason For Visit:   Chief Complaint   Patient presents with     Consult     bilateral knee osteoarthritis referred by Dr. Jesus // discuss treatment options    -had injections last year     Ht 1.73 m (5' 8.11\")   Wt 72.6 kg (160 lb)   BMI 24.25 kg/m      Pain Assessment  Patient Currently in Pain: Yes  0-10 Pain Scale: 3 (with walking)  Primary Pain Location: Knee (bilateral // left is worse than right)      Ramila Fernando ATC    "

## 2021-10-18 NOTE — TELEPHONE ENCOUNTER
FUTURE VISIT INFORMATION      SURGERY INFORMATION:    Date: 21    Location: ur or    Surgeon:  Hermes Kruger MD    Anesthesia Type:  General with Adductor Block    Procedure: ARTHROPLASTY, LEFT KNEE, TOTAL    Consult: ov 10/15    RECORDS REQUESTED FROM:        Primary Care Provider: Juliet Srinivasan MD  - [facility]    Most recent EKG+ Tracin14

## 2021-10-20 NOTE — NURSING NOTE
"Chief Complaint   Patient presents with     Diabetes       Vitals:    10/20/21 1256   BP: 136/67   BP Location: Left arm   Patient Position: Sitting   Cuff Size: Adult Regular   Pulse: 96   SpO2: 95%   Weight: 74 kg (163 lb 3.2 oz)   Height: 1.727 m (5' 8\")       Body mass index is 24.81 kg/m .     Stephany Frederick MA  "

## 2021-10-20 NOTE — PATIENT INSTRUCTIONS
Patient Education     La diabetes: planificación de las comidas    Usted puede ayudar a mantener carrillo nivel de azúcar en la sarai dentro de los límites recomendados si lleva barbara alimentación saul. Carrillo equipo de atención médica puede ayudarle a crear un plan de comidas nutritivo y bajo en grasas. Es necesario que participe activamente en el control de carrillo diabetes. Siga carrillo plan de comidas y colabore con carrillo equipo de atención médica.  Establezca un plan de comidas.  Un plan de comidas margy las pautas sobre las clases y cantidades de alimentos que debe comer. El objetivo es equilibrar los alimentos y la insulina (u otros medicamentos para la diabetes). De larry forma, mantendrá carrillo nivel de azúcar en la sarai dentro de los valores normales. Carrillo nutricionista lo ayudará a elaborar un plan flexible que incluya muchos de los alimentos que le gustan.  Controle el tamaño de las porciones.  Carrillo plan de comidas agrupará los alimentos por porciones. Para aprender cuál es el tamaño de barbara porción, comience midiendo las porciones de alimentos en cada comida. Pronto aprenderá a identificar visualmente el tamaño correcto de barbara porción en carrillo plato. Pregunte a carrillo proveedor de atención médica cómo equilibrar porciones de alimentos diferentes.  Coma alimentos de todos los grupos.  La base de un plan de comidas kaci es la variedad (comer muchos tipos diferentes de alimentos). Elija mandie magras, frutas y verduras frescas, granos integrales y productos lácteos bajos en grasa o sin grasa. [place] barbara gran variedad de alimentos proporciona los nutrientes que carrillo cuerpo necesita. Además, puede ayudar a evitar el aburrimiento con el plan de comidas.  Obtenga información acerca de los carbohidratos, las grasas y las proteínas.    Hay joseph tipos de carbohidratos : almidones, azúcares y fibras. Estos se encuentran en muchos alimentos, penny frutas, pan, pastas, leche y dulces. De todos los alimentos que ingiere, los carbohidratos tienen el mayor  "efecto sobre el nivel de azúcar en la sarai. Carrillo nutricionista puede enseñarle un método para contar carbohidratos . Esta es barbara buena forma de llevar un registro de la cantidad de carbohidratos presentes en barbara comida.    Las grasas tienen la mayor cantidad de calorías y el efecto más importante sobre carrillo peso y sobre el riesgo de enfermedad cardíaca. Si tiene diabetes, es importante controlar carrillo peso para proteger el corazón. Los alimentos con mucha grasa incluyen leche entera, queso, refrigerios (snacks) y postres. Puede comer más cantidad de grasas \"saludables para el corazón\", penny las presentes en aguacates, salmón, atún y aceite de deleon.    Las proteínas son importantes para generar y reparar los músculos y los huesos. Escoja alimentos con proteínas bajas en grasa, penny pescado, claras de huevo y alejo sin piel.    Reduzca los azúcares líquidos.  Las calorías adicionales procedentes de los refrescos, la bebidas para deportistas y los jugos de fruta dificultan el mantenimiento de un nivel óptimo de azúcar en la sarai. Elimine en lo posible los azúcares líquidos de carrillo plan de comidas.  [place] incluye la mayoría de los jugos de frutas, que suelen tener un alto contenido de azúcar natural o añadido. En vez de esto, es recomendable que richie abundante agua u otros líquidos sin azúcar.  Coma menos grasas.  Si necesita perder peso, intente reducir la cantidad de grasa en carrillo dieta. [place] también puede ayudarle a reducir el nivel de colesterol para mantener más sanos los vasos sanguíneos. Barbara manera de reducir las grasas es utilizar solo pequeñas cantidades de aceite para cocinar. Salena detenidamente las etiquetas de los alimentos. Evite los alimentos con grasas trans no saludables.     Para controlar el nivel de azúcar en la sarai, lo importante no es solamente lo que come, sino también cuándo lo come. Es posible que deba comer varias comidas pequeñas, espaciadas de forma regular a lo halie del día, para mantenerse " dentro de los límites recomendados. De modo que no debe saltearse el desayuno ni esperar hasta barbara hora más avanzada en el día para consumir la mayor parte de stacey calorías. [place] puede provocar aumentos o disminuciones demasiado sami del nivel de azúcar en la sarai.    [id number] [employer]. Todos los derechos reservados. Esta información no pretende sustituir la atención médica profesional. Sólo carrillo médico puede diagnosticar y tratar un problema de jatinder.               .Patient Education     Comidas sanas para la diabetes     Un proveedor de atención médica le ayudará a preparar un plan de comidas que sea adecuado a stacey necesidades.     Pídale a carrillo equipo de atención médica que le ayude a preparar un plan de comidas adecuado a stacey necesidades. Jessica plan establecerá el horario de stacey comidas, el tipo de alimentos que debe comer y la cantidad de cada sudha de ellos. No es necesario que deje de comer todas las cosas que le gustan, amado deberá seguir ciertas pautas.  Elija carbohidratos saludables  Los almidones, las azúcares y la fibra son todos tipos de carbohidratos. La fibra puede ayudarle a bajar carrillo colesterol y triglicéridos. También es un alimento saludable para el corazón. Usted debe consumir de 20 a 35 gramos de fibra total todos los días. Entre los alimentos ricos en fibra, se encuentran los siguientes:       Pan y cereales integrales    Lizzy bulgur    Arroz garcia       Pastas de lizzy integral    Frutas y verduras    Frijoles y chícharos secos      Lleve la cuenta de los carbohidratos que consume. [place] puede ayudarle a mantener el equilibrio adecuado entre la actividad física y los medicamentos. La cantidad de carbohidratos necesarios varará para cada persona, dependiendo de muchas cosas penny carrillo jatinder, los medicamentos que ramiro y qué tan activo se mantiene. Puede empezar con unos 45 a 60 gramos de carbohidratos por comida, dependiendo de carrillo situación.  Estos son algunos ejemplos de alimentos  "que contienen unos 15 gramos de carbohidratos (1 porción de carbohidratos):    1/2 taza de fruta enlatada o congelada    Barbara fruta pequeña (4 onzas)    1 rebanada de pan    1/2 taza de nikko    1/3 de taza de arroz    4 a 6 galletas saladas    1/2 English muffin    1/2 taza de frijoles negros    1/4 de papa azada, tomer (3 onzas)    2/3 de taza de yogur natural sin grasa    1 taza de sopa    1/2 taza de guizado    6 \"nuggets\" de alejo    1 bownie de 2 pulgadas cuadradas o un pastel sin glasear    2 galletas dulces pequeñas    1/2 taza de helado o de sorbete  Escoja alimentos proteínicos sanos  Las proteínas bajas en grasa pueden ayudarle a controlar el peso y a mantener kaci el corazón. Entre los alimentos proteínicos bajos en grasa, se encuentran los siguientes:    Pescado    Proteínas vegetales penny las de los frijoles y chícharos secos, las nueces y los productos derivados de la soya, penny el tofu y la leche de soya    Carne magra a la que se le ha quitado toda la grasa visible    Carne de ave sin la piel    Leche, queso o yogur bajos en grasa o sin grasa  Limite las grasas no saludables y el azúcar  Las grasas saturadas y las grasas trans no son buenas para el corazón porque aumentan el colesterol LDL (vic). Además, la grasa tiene un alto contenido de calorías y le puede hacer ganar peso. Para reducir la cantidad de grasas malas y azúcar, limite el consumo de los siguientes alimentos:       Mantequilla y margarina    Aceite de mcclure o de cl    Crema o alfred    Queso    Tocino    Mera frías       Helados    Dulces, pasteles, madalenas y donas    Mermeladas y gelatinas    Chocolatinas    Refrescos azucarados   Cuánto debe comer  La cantidad de comida que ingiere afecta el nivel de azúcar en carrillo sarai y carrillo peso. Carrillo equipo de atención médica le dirá cuánto debe comer de cada clase de alimentos.    Utilice tazas y cucharas de medir, y barbara balanza de cocina para medir las porciones.    Aprenda a identificar " "visualmente el tamaño correcto de barbara porción en carrillo plato. [place] le será útil cuando coma fuera de casa y no pueda medir las porciones.    Coma solamente el número de porciones que le hayan dado en carrillo plan de comidas para cada tipo de alimento. No se sirva por segunda vez.    Aprenda a leer las etiquetas de los alimentos. Asegúrese de saber cuánto es barbara porción, y la cantidad total de carbohidratos, fibra, calorías, azúcar y grasa saturada y trans que tiene. Busque alternativas más saludables para los alimentos que tengan azúcar añadida.    Planifique de antemano para las fiestas de manera que aún pueda divertirse sin sobrepasarse con alimentos poco saludables. Dé un buen ejemplo trayendo un platillo saudable a los \"potlucks\".  Elija bocadillos saludables  Cuando se habla de bocadillos se piensa en alimentos con azúcar añadida y grasas, amado hay otras opciones de bocadillos que son más saludables. Estas son algunas ideas que puede elegir:  Bocadillos con menos de 5 gramos de carbohidratos    Barbara unidad de queso en tiras (string cheese)    Bernardino ramas de apio con 1 cucharada de mantequilla de cacahuate    5 tomates tipo \"cherry\" con 1 cucharada de aderezo tipo \"ranch\"    1 huevo emily    1/4 de taza de arándanos azules (blue berries)    5 zanahorias \"baby\"    1 taza de palomitas de maiz \"light\"    1/2 taza de gelatina sin azúcar    15 almendras  Bocadillos con entre 10 y 20 gramos de carbohidratos aproximadamente    1/3 de taza de \"hummus\" con trozos de vegetales sin almidon (zanahorias, pimientos verdes, brócoli, apio o barbara combinación)    1/2 taza de fruta fresca o enlatada con 1/4 de taza de requezón (cottage cheese)    1/2 taza de ensalada de atún con 4 galletas saladas    2 pasteles de arroz (rice cakes) y barbara cucharada de mantequilla de cacahuate    1 manzana o 1 naranja pequeñas    3 tazas de palomitas de maíz \"light\"    1/2 sándwich de pavo que tenga barbara rebanada de pan de lizzy integral, 2 onzas de pavo y " mostaza  El tamaño de las porciones es importante para controlar el azúcar en la sarai y mantener un peso saludable. Aprovisiónese de bocadillos saludables para que los tenga siempre a mano.  Cuándo debe comer  Carrillo plan de comidas probablemente incluirá desayuno, almuerzo, adis y algunos bocadillos entre comidas.    Intente que stacey comidas y bocadillos antonino a la misma hora todos los días.    Coma todas stacey comidas y bocadillos. Saltarse barbara comida o bocadillo puede hacer que baje demasiado el nivel de azúcar en carrillo sarai y provocar que coma excesivamente más tarde, con lo que el nivel de azúcar en carrilol sarai podría elevarse demasiado.    [id number] [employer]. Todos los derechos reservados. Esta información no pretende sustituir la atención médica profesional. Sólo carrillo médico puede diagnosticar y tratar un problema de jatinder.             Metformina    500mg barbara

## 2021-10-20 NOTE — LETTER
10/20/2021         RE: Stephen Valerio  [address] [facility] [address]        Dear Colleague,    Thank you for referring your patient, Stephen Valerio, to the [facility] [place]. Please see a copy of my visit note below.    Stephen Valerio is a 81 year old yo male here to establish care for Diabetes Mellitus. He was accompanied by his daughter in law.  on ipad was used.      1) Diabetes Mellitus    Diabetes History:  Diagnosis: 3 years ago, was detected 1 year ago in [place]  Hospitalizations: none  Previous Regimens: metformin  Current Regimen: Jardiance 20mg daily (increased from 10mg daily on 10/12), dietary changes    BG check- Daily, first thing in AM.  Trends-   -250- fasting/no food    His PCP recommended to increase water intake, for the increase in medicaiton ,bu tpreviously was not using bathroom that frequently.   Detected DM in [facility] and started medication, and when he arrived here they stopped medications and told him he didn't have trouble with blood sugars here. Likely was metfromin, taking 3 times daily  Planning for R knee surgery, causing signficant pain     Complications: neuropathy    Last eye exam: extensive exams from cataract surgeon, trying to get records (PCP)  Foot Exam: completed by PCP  ACEI/ARB: none now  Statin/ASA:  none    24hr Recall:  Breakfast- green tea, shake- put protein powder, strawberries, and some strawberry flavoring without sugar, milk 2%, granola bar  Lunch-   Dinner- Cactus salad, beans w/o fat, pasta, 2 tortillas (corn)- 3pm  Snacks- Pineapple, pear   Late night- tea  Beverages- lots of water, stopped drinking juice (8oz)    Blurry vision in one eye, had been planning for cataract surgeries and had follow-up exam 2 weeks ago with intense exam   Ophthalmology- Dr Garrison- clinic in [place], PCP is trying to obtain records.     BP Readings from Last 3 Encounters:   10/20/21 136/67    10/12/21 (!) 142/77   10/05/21 136/75       Lab Results   Component Value Date    A1C 5.5 08/05/2021       Recent Labs   Lab Test 08/05/21  0929 04/12/14  1050   CHOL 172 163   HDL 44 41   LDL 94 82   TRIG 168* 201*   CHOLHDLRATIO  --  4.0       Lab Results   Component Value Date    MICROL 6 10/12/2021     No results found for: MICROALBUMIN      Wt Readings from Last 3 Encounters:   10/20/21 74 kg (163 lb 3.2 oz)   10/15/21 72.6 kg (160 lb)   10/12/21 71.7 kg (158 lb)       Current Outpatient Medications   Medication Sig Dispense Refill     alcohol swab prep pads Use to swab area of injection/olga as directed. 100 each 6     allopurinol (ZYLOPRIM) 300 MG tablet Take 1 tablet (300 mg) by mouth daily 30 tablet 2     blood glucose (ACCU-CHEK SOFTCLIX) lancing device Device to be used with lancets. 1 each 0     blood glucose (NO BRAND SPECIFIED) test strip Use to test blood sugar 1-2 times daily or as directed. Accu-Chek 100 strip 4     blood glucose monitoring (ACCU-CHEK MULTICLIX) lancets Use to test blood sugar 1-2 times daily. 102 each 11     empagliflozin (JARDIANCE) 10 MG TABS tablet Take 2 tablets (20 mg) by mouth daily 60 tablet 3     metFORMIN (GLUCOPHAGE) 500 MG tablet Take 1 tablet (500 mg) by mouth 2 times daily (with meals) 120 tablet 11     methotrexate 2.5 MG tablet Take 8 tablets (20 mg) by mouth every 7 days 32 tablet 2     predniSONE (DELTASONE) 5 MG tablet Take 1 tablet (5 mg) by mouth daily Take 1 tablet twice a day for 3 weeks 42 tablet 1       Histories reviewed and updated in Epic.  Past Medical History:   Diagnosis Date     Arthritis      Chronic back pain      Gout      Hyperlipidemia LDL goal <130 4/8/2014       Past Surgical History:   Procedure Laterality Date     IRRIGATION AND DEBRIDEMENT HAND, COMBINED  2/11/2014    Procedure: COMBINED IRRIGATION AND DEBRIDEMENT HAND;  I&D Left Wrist;  Surgeon: Randy Perrin MD;  Location:  OR       Allergies:  Patient has no known  "allergies.    Social History     Tobacco Use     Smoking status: Former Smoker     Smokeless tobacco: Never Used   Substance Use Topics     Alcohol use: No       Family History   Problem Relation Age of Onset     Diabetes Brother           REVIEW OF SYSTEMS:   ROS: 10 point ROS neg other than the symptoms noted above in the HPI.      EXAM:  Vitals: /67 (BP Location: Left arm, Patient Position: Sitting, Cuff Size: Adult Regular)   Pulse 96   Ht 1.727 m (5' 8\")   Wt 74 kg (163 lb 3.2 oz)   SpO2 95%   BMI 24.81 kg/m      BMIE= Body mass index is 24.81 kg/m .  Exam:  Constitutional: healthy, alert, no acute distress  Head: Normocephalic. No masses, lesions, no exophthalmos/proptosis  ENT: no visible goiter  Respiratory: nonlabored  Gastrointestinal: Abdomen soft, non-tender.  Musculoskeletal: extremities normal- no gross deformities noted, gait normal and normal muscle tone  Skin: no suspicious lesions or rashes  Neurologic: Gait normal. sensation grossly intact  Psychiatric: mentation appears normal, calm        ASSESSMENT/PLAN:  No diagnosis found.  No orders of the defined types were placed in this encounter.      1) Diabetes Mellitus   - Glucose Control- Not at goal, recently intensified Jardiance.  However likely in the setting of current prednisone use still experiencing hyperglycemia.  We will plan for intensification of diabetes regimen    - Jardiance 20mg daily    - Restart meformin 1000mg BID goal-- start with 500mg daily and uptitrate by 500mg weekly if tolerated    - Discussed checking at varying times throughout the day    - Has upcoming visit with diabetes educator-- they are eating signficant amounts of fruit that we discussed are still sugars, and should be eaten in moderation. Encouraged incorporation of vegetables. He will work on decreasing frutis and increasing vegetables, looking forward to increasing exercise with next visit.   - Cardiovascular Risk- would benefit from statin, will not " start new med prior to surgery  - Ophthalmology- need records  - Podiatry- patient instructed on routine foot care  - Renal- Uptodate, CALIXTO due 9/2022    - Needs A1C recheck. Will order now for completion with next labs.    RTC- 3 months with meter      A total of 57 minutes were spent today 10/20/21  on this visit including documentation, chart review with greater than 50% of time spent on direct counseling.            Again, thank you for allowing me to participate in the care of your patient.        Sincerely,        Lauryn Goomdan MD

## 2021-10-20 NOTE — PROGRESS NOTES
Stephen Valerio is a 81 year old yo male here to establish care for Diabetes Mellitus. He was accompanied by his daughter in law.  on ipad was used.      1) Diabetes Mellitus    Diabetes History:  Diagnosis: 3 years ago, was detected 1 year ago in [place]  Hospitalizations: none  Previous Regimens: metformin  Current Regimen: Jardiance 20mg daily (increased from 10mg daily on 10/12), dietary changes    BG check- Daily, first thing in AM.  Trends-   -250- fasting/no food    His PCP recommended to increase water intake, for the increase in medicaiton ,bu dominickiously was not using bathroom that frequently.   Detected DM in [facility] and started medication, and when he arrived here they stopped medications and told him he didn't have trouble with blood sugars here. Likely was metfromin, taking 3 times daily  Planning for R knee surgery, causing signficant pain     Complications: neuropathy    Last eye exam: extensive exams from cataract surgeon, trying to get records (PCP)  Foot Exam: completed by PCP  ACEI/ARB: none now  Statin/ASA:  none    24hr Recall:  Breakfast- green tea, shake- put protein powder, strawberries, and some strawberry flavoring without sugar, milk 2%, granola bar  Lunch-   Dinner- Cactus salad, beans w/o fat, pasta, 2 tortillas (corn)- 3pm  Snacks- Pineapple, pear   Late night- tea  Beverages- lots of water, stopped drinking juice (8oz)    Blurry vision in one eye, had been planning for cataract surgeries and had follow-up exam 2 weeks ago with intense exam   Ophthalmology- Dr Garrison- clinic in [place], PCP is trying to obtain records.     BP Readings from Last 3 Encounters:   10/20/21 136/67   10/12/21 (!) 142/77   10/05/21 136/75       Lab Results   Component Value Date    A1C 5.5 08/05/2021       Recent Labs   Lab Test 08/05/21  0929 04/12/14  1050   CHOL 172 163   HDL 44 41   LDL 94 82   TRIG 168* 201*   CHOLHDLRATIO  --  4.0       Lab Results   Component Value  Date    MICROL 6 10/12/2021     No results found for: MICROALBUMIN      Wt Readings from Last 3 Encounters:   10/20/21 74 kg (163 lb 3.2 oz)   10/15/21 72.6 kg (160 lb)   10/12/21 71.7 kg (158 lb)       Current Outpatient Medications   Medication Sig Dispense Refill     alcohol swab prep pads Use to swab area of injection/olga as directed. 100 each 6     allopurinol (ZYLOPRIM) 300 MG tablet Take 1 tablet (300 mg) by mouth daily 30 tablet 2     blood glucose (ACCU-CHEK SOFTCLIX) lancing device Device to be used with lancets. 1 each 0     blood glucose (NO BRAND SPECIFIED) test strip Use to test blood sugar 1-2 times daily or as directed. Accu-Chek 100 strip 4     blood glucose monitoring (ACCU-CHEK MULTICLIX) lancets Use to test blood sugar 1-2 times daily. 102 each 11     empagliflozin (JARDIANCE) 10 MG TABS tablet Take 2 tablets (20 mg) by mouth daily 60 tablet 3     metFORMIN (GLUCOPHAGE) 500 MG tablet Take 1 tablet (500 mg) by mouth 2 times daily (with meals) 120 tablet 11     methotrexate 2.5 MG tablet Take 8 tablets (20 mg) by mouth every 7 days 32 tablet 2     predniSONE (DELTASONE) 5 MG tablet Take 1 tablet (5 mg) by mouth daily Take 1 tablet twice a day for 3 weeks 42 tablet 1       Histories reviewed and updated in Epic.  Past Medical History:   Diagnosis Date     Arthritis      Chronic back pain      Gout      Hyperlipidemia LDL goal <130 4/8/2014       Past Surgical History:   Procedure Laterality Date     IRRIGATION AND DEBRIDEMENT HAND, COMBINED  2/11/2014    Procedure: COMBINED IRRIGATION AND DEBRIDEMENT HAND;  I&D Left Wrist;  Surgeon: Randy Perrin MD;  Location:  OR       Allergies:  Patient has no known allergies.    Social History     Tobacco Use     Smoking status: Former Smoker     Smokeless tobacco: Never Used   Substance Use Topics     Alcohol use: No       Family History   Problem Relation Age of Onset     Diabetes Brother           REVIEW OF SYSTEMS:   ROS: 10 point ROS neg other  "than the symptoms noted above in the HPI.      EXAM:  Vitals: /67 (BP Location: Left arm, Patient Position: Sitting, Cuff Size: Adult Regular)   Pulse 96   Ht 1.727 m (5' 8\")   Wt 74 kg (163 lb 3.2 oz)   SpO2 95%   BMI 24.81 kg/m      BMIE= Body mass index is 24.81 kg/m .  Exam:  Constitutional: healthy, alert, no acute distress  Head: Normocephalic. No masses, lesions, no exophthalmos/proptosis  ENT: no visible goiter  Respiratory: nonlabored  Gastrointestinal: Abdomen soft, non-tender.  Musculoskeletal: extremities normal- no gross deformities noted, gait normal and normal muscle tone  Skin: no suspicious lesions or rashes  Neurologic: Gait normal. sensation grossly intact  Psychiatric: mentation appears normal, calm        ASSESSMENT/PLAN:  No diagnosis found.  No orders of the defined types were placed in this encounter.      1) Diabetes Mellitus   - Glucose Control- Not at goal, recently intensified Jardiance.  However likely in the setting of current prednisone use still experiencing hyperglycemia.  We will plan for intensification of diabetes regimen    - Jardiance 20mg daily    - Restart meformin 1000mg BID goal-- start with 500mg daily and uptitrate by 500mg weekly if tolerated    - Discussed checking at varying times throughout the day    - Has upcoming visit with diabetes educator-- they are eating signficant amounts of fruit that we discussed are still sugars, and should be eaten in moderation. Encouraged incorporation of vegetables. He will work on decreasing frutis and increasing vegetables, looking forward to increasing exercise with next visit.   - Cardiovascular Risk- would benefit from statin, will not start new med prior to surgery  - Ophthalmology- need records  - Podiatry- patient instructed on routine foot care  - Renal- Uptodate, CALIXTO due 9/2022    - Needs A1C recheck. Will order now for completion with next labs.    RTC- 3 months with meter      A total of 57 minutes were spent " today 10/20/21  on this visit including documentation, chart review with greater than 50% of time spent on direct counseling.

## 2021-10-21 NOTE — PATIENT INSTRUCTIONS
Preparing for Your Surgery      Name:  Stephen Valerio   MRN:  [id number]   :  1940   Today's Date:  10/21/2021       Arriving for surgery:  Surgery date:  21  Arrival time:  10:00 am    Restrictions due to COVID 19:       One visitor is allowed in the Pre Op area.       When you go into surgery, one visitor is allowed to wait in the Surgery Waiting Room       (provided there is enough space to social distance).         In hospital patients are allowed 1 visitor per day       The visitor may change daily     Visiting Hours: 8 am - 8:30 pm   No ill visitors.   All visitors must wear face mask.    [address] parking is available for anyone with mobility limitations or disabilities.  ([place]  24 hours/ 7 days a week; [facility]  7 am- 3:30 pm, Mon- Fri)    Please come to:     [facility] [address]  [address] [place]  [address]    -[address] parking is available in front of [facility] from 5:15AM to 8:00PM. If you prefer, park your car in the Green Lot.    -Proceed to the 3rd floor, check in at the Adult Surgery Waiting Lounge. [phone]    If an escort is needed stop at the Information Desk in the lobby. Inform the information person that you are here for surgery. An escort to the Adult Surgery Waiting Lounge will be provided.        What can I eat or drink?  -  You may eat and drink normally for up to 8 hours before your surgery. (Until 4:30 am)  -  You may have clear liquids until 2 hours before surgery. (Until 10:00 am)    Examples of clear liquids:  Water  Clear broth  Juices (apple, white grape, white cranberry  and cider) without pulp  Noncarbonated, powder based beverages  (lemonade and Christian-Aid)  Sodas (Sprite, 7-Up, ginger ale and seltzer)  Coffee or tea (without milk or cream)  Gatorade    -  No Alcohol for at least 24 hours before surgery     Which medicines can I take?    Hold Aspirin for 7 days before  surgery.   Hold Multivitamins for 7 days before surgery.  Hold Supplements for 7 days before surgery.  Hold Ibuprofen (Advil, Motrin) for 1 day before surgery--unless otherwise directed by surgeon.  Hold Naproxen (Aleve) for 4 days before surgery.    **Hold Empaglifliozin (Jardiance) 3 days before surgery - take your last dose on 10/28/21.**    **Hold the next dose of Methotrexate.**    -  DO NOT take these medications the day of surgery:  Metformin (Glucophage)    -  PLEASE TAKE these medications the day of surgery:  Acetaminophen (Tylenol)  Allopurinol (Zyloprim)  Prednisone (Deltasone)    How do I prepare myself?  - Please take 2 showers before surgery using Scrubcare or Hibiclens soap.    Use this soap only from the neck to your toes.     Leave the soap on your skin for one minute--then rinse thoroughly.      You may use your own shampoo and conditioner; no other hair products.   - Please remove all jewelry and body piercings.  - No lotions, deodorants or fragrance.  - Bring your ID and insurance card.    -If you have a Deep Brain Stimulator, Spinal Cord Stimulator or any neuro stimulator device---you must bring the remote control to the hospital     - All patients are required to have a Covid-19 test within 4 days of surgery/procedure.      -Patients will be contacted by the [facility] scheduling team within 1 week of surgery to make an appointment.      - Patients may call the Scheduling team at [phone] if they have not been scheduled within 4 days of  surgery.      Questions or Concerns:    - For any questions regarding the day of surgery or your hospital stay, please contact the Pre Admission Nursing Office at [phone].       - If you have health changes between today and your surgery please call your surgeon.       For questions after surgery please call your surgeons office.

## 2021-10-21 NOTE — ANESTHESIA PREPROCEDURE EVALUATION
Anesthesia Pre-Procedure Evaluation    Patient: Stephen Valerio   MRN: [id number] : 1940        Preoperative Diagnosis: Bilateral primary osteoarthritis of knee [M17.0]    Procedure : Procedure(s):  ARTHROPLASTY, LEFT KNEE, TOTAL  PAC EVALUATION       Past Medical History:   Diagnosis Date     Arthritis      Chronic back pain      Gout      Hyperlipidemia LDL goal <130 2014      Past Surgical History:   Procedure Laterality Date     IRRIGATION AND DEBRIDEMENT HAND, COMBINED  2014    Procedure: COMBINED IRRIGATION AND DEBRIDEMENT HAND;  I&D Left Wrist;  Surgeon: Randy Perrin MD;  Location: [facility] OR      No Known Allergies   Social History     Tobacco Use     Smoking status: Former Smoker     Smokeless tobacco: Never Used   Substance Use Topics     Alcohol use: No      Wt Readings from Last 1 Encounters:   10/21/21 73.9 kg (163 lb)        Anesthesia Evaluation   Pt has had prior anesthetic. Type: Regional.    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 Similar to ekg  with lower anterior lead voltage and occasional pvc  Cath: Date: Results:   (-) murmur   METS/Exercise Tolerance: 3 - Able to walk 1-2 blocks without stopping Comment: Ambulates with cane due to knee pain   Hematologic:     (+) anemia,  (-) history of blood clots   Musculoskeletal: Comment: Severe left knee arthritis, mild/mod in right knee    Gout - B/L wrists    Rheumatoid arthritis, taking methotrexate & prednisone  (+) arthritis,     GI/Hepatic:  - neg GI/hepatic ROS  (-) GERD and liver disease   Renal/Genitourinary: Comment: Creatinine 1.17, GFR 59 on 10/5/21      (+)  renal disease, type: CRI,     Endo:     (+) type II DM, Last HgA1c: 9.6, Not using insulin, Chronic steroid usage for Arthritis. Date most recently used: prednisone 5mg bid.     Psychiatric/Substance Use:  - neg psychiatric ROS     Infectious Disease:  - neg infectious disease ROS     Malignancy:  - neg malignancy ROS     Other:  - neg other ROS          Physical Exam    Airway        Mallampati: III   TM distance: > 3 FB   Neck ROM: full   Mouth opening: < 3 cm    Respiratory Devices and Support         Dental  no notable dental history         Cardiovascular   cardiovascular exam normal       Rhythm and rate: regular and normal (-) no murmur    Pulmonary   pulmonary exam normal        breath sounds clear to auscultation           OUTSIDE LABS:  CBC:   Lab Results   Component Value Date    WBC 11.0 10/12/2021    WBC 8.8 10/08/2021    HGB 14.2 10/12/2021    HGB 13.8 10/08/2021    HCT 42.3 10/12/2021    HCT 41.8 10/08/2021     10/12/2021     10/08/2021     BMP:   Lab Results   Component Value Date     10/05/2021     09/28/2021    POTASSIUM 4.3 10/05/2021    POTASSIUM 4.1 09/28/2021    CHLORIDE 102 10/05/2021    CHLORIDE 100 09/28/2021    CO2 28 10/05/2021    CO2 28 09/28/2021    BUN 33 (H) 10/05/2021    BUN 14 09/28/2021    CR 0.94 10/12/2021    CR 1.03 10/08/2021     (H) 10/05/2021     (H) 09/28/2021     COAGS:   Lab Results   Component Value Date    PTT 37 02/11/2014    INR 1.16 (H) 02/11/2014     POC:   Lab Results   Component Value Date     (H) 02/13/2014     HEPATIC:   Lab Results   Component Value Date    ALBUMIN 3.7 10/12/2021    PROTTOTAL 7.4 08/12/2021    ALT 18 10/12/2021    AST 9 10/12/2021    ALKPHOS 54 08/12/2021    BILITOTAL 0.4 08/12/2021     OTHER:   Lab Results   Component Value Date    LACT 1.0 02/11/2014    A1C 5.5 08/05/2021    BRADY 10.1 10/05/2021    PHOS 3.4 09/28/2021    MAG 1.3 (L) 02/13/2014    TSH 9.42 (H) 08/05/2021    T4 1.03 08/05/2021    CRP  11.0 (H) 08/12/2021    SED 42 (H) 08/12/2021       Anesthesia Plan    ASA Status:  3   NPO Status:  NPO Appropriate    Anesthesia Type: General.     - Airway: ETT   Induction: Inhalation.   Maintenance: Balanced.   Techniques and Equipment:     - Airway: Video-Laryngoscope         Consents    Anesthesia Plan(s) and associated risks, benefits, and realistic alternatives discussed. Questions answered and patient/representative(s) expressed understanding.     - Discussed with:  Patient,       - Extended Intubation/Ventilatory Support Discussed: No.      - Patient is DNR/DNI Status: No    Use of blood products discussed: Yes.     - Discussed with: Patient.     - Consented: consented to blood products            Reason for refusal: other.     Postoperative Care    Pain management: Oral pain medications, IV analgesics, Multi-modal analgesia, Peripheral nerve block (Single Shot).   PONV prophylaxis: Ondansetron (or other 5HT-3)     Comments:    Possible stress dose steroids.    Discussed risks of anesthesia including nausea, vomiting, sore throat, dental damage, cardiopulmonary complications, blood transfusion, neurologic dysfunction, and serious complications.    Discussed importance of blood glucose control in the perioperative period for wound healing.            PAC Discussion and Assessment    ASA Classification: 3  Case is suitable for: [place]  Anesthetic techniques and relevant risks discussed: GA with regional block for post-op pain control  Invasive monitoring and risk discussed: No    Possibility and Risk of blood transfusion discussed: No            PAC Resident/NP Anesthesia Assessment: Stephen Valerio is a 82 y/o male who presents for pre-operative H&P in preparation for ARTHROPLASTY, LEFT KNEE, TOTAL with Hermes Eddy MD on 11/1/21 at [facility] for treatment of Bilateral primary osteoarthritis of knee.    Pt has had prior anesthetic. Type:  Regional.    No history of anesthetic complications        He has the following specific operative considerations:   # JEFF 2/8 = low risk  # VTE risk: 0.5%  # Risk of PONV score = 2.  If > 2, anti-emetic intervention recommended.  # Anesthesia considerations:  Refer to PAC assessment in anesthesia records      CARDIAC: METS  3 - Able to walk 1 block without stopping. Ambulates with cane due to knee pain     # RCRI : No serious cardiac risks.  0.4% risk of major adverse cardiac event.       PULMONARY:     # Former smoker, 25 pk yr hx, quit 2005    # Denies asthma or inhaler use    GI:     # Denies GERD    /RENAL:     # Creatinine 1.17, GFR 59 on 10/5/21    ENDO: BMI 24    # DM2, A1c on 8/5/21 was 5.5. On jardiance.    # On daily prednisone for RA.  Consider stress dose in perioperative period, if indicated.      ORTHO:     # full ROM of neck    # Rheumatoid arthritis, has methotrexate Q Monday. Last dose 10/18/21.    # OA of knees, L>R    # Gout involving wrists      Patient is optimized and is acceptable candidate for the proposed procedure. No further diagnostic evaluation is needed.    Final plan per anesthesiologist on day of surgery.     Reviewed and Signed by PAC Mid-Level Provider/Resident  Mid-Level Provider/Resident: Liliam Davidson PA-C  Date: 10/21/21  Time: 1181                               Liliam Davidson PA-C

## 2021-10-21 NOTE — H&P
Pre-Operative H & P     CC:  Preoperative exam to assess for increased cardiopulmonary risk while undergoing surgery and anesthesia.    Date of Encounter: 10/21/2021  Primary Care Physician:  Juliet Srinivasan     Reason for visit:   Encounter Diagnoses   Name Primary?     Bilateral primary osteoarthritis of knee Yes     Preop examination        HPI  Stephen Valerio is a 82 y/o male who presents for pre-operative H&P in preparation for ARTHROPLASTY, LEFT KNEE, TOTAL with Hermes Eddy MD on 11/1/21 at [facility] for treatment of Bilateral primary osteoarthritis of knee. Patient is being evaluated for comorbid conditions of rheumatoid arthritis, DM2, GERD, low back pain, gout.       Mr. Galindo Valerio has noticed years of left greater than right knee pain.  In the distant past he had some injections.  He is having daily pain with walking and sedentary activities.  The pain is limited to his knees. Imaging shows severe degenerative arthritis in both knees. He now presents for the above procedure.      History was obtained from patient via Taiwanese iPad  & chart review. He is accompanied by two family members.        Hx of abnormal bleeding or anti-platelet use: denies    Menstrual history: No LMP for male patient.:      Prior to Admission Medications  Current Outpatient Medications   Medication Sig Dispense Refill     acetaminophen (TYLENOL) 325 MG tablet Take 325-650 mg by mouth every 6 hours as needed for mild pain       allopurinol (ZYLOPRIM) 300 MG tablet Take 1 tablet (300 mg) by mouth daily (Patient taking differently: Take 300 mg by mouth every morning ) 30 tablet 2     empagliflozin (JARDIANCE) 10 MG TABS tablet Take 2 tablets (20 mg) by mouth daily (Patient taking differently: Take 20 mg by mouth 2 times daily ) 60 tablet 3     metFORMIN (GLUCOPHAGE) 500 MG tablet Take 1 tablet (500 mg) by mouth 2 times daily (with meals) (Patient taking  differently: Take 500 mg by mouth every 7 days Wed) 120 tablet 11     methotrexate 2.5 MG tablet Take 8 tablets (20 mg) by mouth every 7 days (Patient taking differently: Take 20 mg by mouth every 7 days Monday) 32 tablet 2     predniSONE (DELTASONE) 5 MG tablet Take 1 tablet (5 mg) by mouth daily Take 1 tablet twice a day for 3 weeks (Patient taking differently: Take 5 mg by mouth 2 times daily Take 1 tablet twice a day for 3 weeks) 42 tablet 1     alcohol swab prep pads Use to swab area of injection/olga as directed. 100 each 6     blood glucose (ACCU-CHEK SOFTCLIX) lancing device Device to be used with lancets. 1 each 0     blood glucose (NO BRAND SPECIFIED) test strip Use to test blood sugar 1-2 times daily or as directed. Accu-Chek 100 strip 4     blood glucose monitoring (ACCU-CHEK MULTICLIX) lancets Use to test blood sugar 1-2 times daily. 102 each 11       Family History  Family History   Problem Relation Age of Onset     Diabetes Brother      Anesthesia Reaction No family hx of      Cardiovascular No family hx of      Deep Vein Thrombosis (DVT) No family hx of        The complete review of systems is negative other than noted in the HPI or here.       Anesthesia Pre-Procedure Evaluation    Patient: Stephen Valerio   MRN: [id number] : 1940        Preoperative Diagnosis: Bilateral primary osteoarthritis of knee [M17.0]    Procedure : Procedure(s):  ARTHROPLASTY, LEFT KNEE, TOTAL  PAC EVALUATION       Past Medical History:   Diagnosis Date     Arthritis      Chronic back pain      Gout      Hyperlipidemia LDL goal <130 2014      Past Surgical History:   Procedure Laterality Date     IRRIGATION AND DEBRIDEMENT HAND, COMBINED  2014    Procedure: COMBINED IRRIGATION AND DEBRIDEMENT HAND;  I&D Left Wrist;  Surgeon: Randy Perrin MD;  Location:  OR      No Known Allergies   Social History     Tobacco Use     Smoking status: Former Smoker     Smokeless tobacco: Never Used  "  Substance Use Topics     Alcohol use: No      Wt Readings from Last 1 Encounters:   10/21/21 73.9 kg (163 lb)             ROS/MED HX  The complete review of systems is negative other than noted in the HPI or here.  Patient denies recent illness, fever and respiratory infection during past month.    ENT/Pulmonary:     (+) tobacco use, Past use,  (-) asthma and sleep apnea   Neurologic:  - neg neurologic ROS  (-) no seizures and no CVA   Cardiovascular:     (+) Dyslipidemia -----    METS/Exercise Tolerance: 3 - Able to walk 1-2 blocks without stopping Comment: Ambulates with cane due to knee pain   Hematologic:    (-) history of blood clots   Musculoskeletal: Comment: Severe left knee arthritis, mild/mod in right knee    Gout - B/L wrists    Rheumatoid arthritis, taking methotrexate & prednisone  (+) arthritis,     GI/Hepatic:  - neg GI/hepatic ROS  (-) GERD and liver disease   Renal/Genitourinary: Comment: Creatinine 1.17, GFR 59 on 10/5/21      (+) renal disease, type: CRI,     Endo:     (+) type II DM, Not using insulin, Chronic steroid usage for Arthritis. Date most recently used: daily prednisone.     Psychiatric/Substance Use:  - neg psychiatric ROS     Infectious Disease:  - neg infectious disease ROS     Malignancy:  - neg malignancy ROS     Other:  - neg other ROS          Physical Exam    Airway        Mallampati: II   TM distance: > 3 FB   Neck ROM: full   Mouth opening: > 3 cm    Respiratory Devices and Support         Dental  Upper & lower dentures         Cardiovascular   cardiovascular exam normal          Pulmonary   pulmonary exam normal             PAC Discussion and Assessment    ASA Classification: 3  Case is suitable for: [facility] [place]    /69 (BP Location: Left arm, Patient Position: Chair, Cuff Size: Adult Regular)   Pulse 89   Temp 97.9  F (36.6  C) (Oral)   Resp 20   Ht 1.727 m (5' 8\")   Wt 73.9 kg (163 lb)   SpO2 94%   BMI 24.78 kg/m           Physical Exam  Constitutional: Awake, " alert, cooperative, no apparent distress, and appears stated age. Seated in wheelchair; has cane also.  Eyes: Pupils equal, round and reactive to light, extra ocular muscles intact, sclera clear, conjunctiva normal.  HENT: Normocephalic, oral pharynx with moist mucus membranes, good dentition. No goiter appreciated. Upper & lower dentures in place.  Respiratory: Clear to auscultation bilaterally, no crackles or wheezing.   Cardiovascular: Regular rate and rhythm, normal S1 and S2, and no murmur noted.  Carotids +2, no bruits. No edema. Palpable pulses to radial, DP and PT arteries.   GI: Normal bowel sounds, soft, non-distended, non-tender, no masses palpated.    Lymph/Hematologic: No cervical lymphadenopathy and no supraclavicular lymphadenopathy.  Genitourinary:  deferred  Skin: Warm and dry.  No rashes.   Musculoskeletal: full ROM of neck. There is no redness, warmth, or swelling of the joints. Gross motor strength is normal.    Neurologic: Awake, alert, oriented to name, place and time. Cranial nerves II-XII are grossly intact. Gait not assessed. Neuropsychiatric: Calm, cooperative. Normal affect. Pleasant. Answers questions appropriately via , follows commands w/o difficulty.    PRIOR LABS/DIAGNOSTIC STUDIES:    All labs and imaging personally reviewed      CBC:   Lab Results   Component Value Date    WBC 11.0 10/12/2021    WBC 8.8 10/08/2021    HGB 14.2 10/12/2021    HGB 13.8 10/08/2021    HCT 42.3 10/12/2021    HCT 41.8 10/08/2021     10/12/2021     10/08/2021     BMP:   Lab Results   Component Value Date     10/05/2021     09/28/2021    POTASSIUM 4.3 10/05/2021    POTASSIUM 4.1 09/28/2021    CHLORIDE 102 10/05/2021    CHLORIDE 100 09/28/2021    CO2 28 10/05/2021    CO2 28 09/28/2021    BUN 33 (H) 10/05/2021    BUN 14 09/28/2021    CR 0.94 10/12/2021    CR 1.03 10/08/2021     (H) 10/05/2021     (H) 09/28/2021     COAGS:   Lab Results   Component Value Date     PTT 37 02/11/2014    INR 1.16 (H) 02/11/2014     POC:   Lab Results   Component Value Date     (H) 02/13/2014     HEPATIC:   Lab Results   Component Value Date    ALBUMIN 3.7 10/12/2021    PROTTOTAL 7.4 08/12/2021    ALT 18 10/12/2021    AST 9 10/12/2021    ALKPHOS 54 08/12/2021    BILITOTAL 0.4 08/12/2021     OTHER:   Lab Results   Component Value Date    LACT 1.0 02/11/2014    A1C 5.5 08/05/2021    BRADY 10.1 10/05/2021    PHOS 3.4 09/28/2021    MAG 1.3 (L) 02/13/2014    TSH 9.42 (H) 08/05/2021    T4 1.03 08/05/2021    CRP 11.0 (H) 08/12/2021    SED 42 (H) 08/12/2021     XR KNEE BILATERAL 3 VIEWS 9/8/2021   Impression:       1.  Severe tricompartmental degenerative arthritis in both knees. No   fracture or dislocation. Moderate right knee joint effusion.   Small-moderate left knee joint effusion. Ossified joint bodies are   present bilaterally.         EKG 2014   Sinus tachycardia    Inferior infarct , age undetermined    Abnormal ECG    No previous ECGs available   Ventricular rate 114 bpm          The patient's records and results personally reviewed by this provider.       LABS/DIAGNOSTIC STUDIES TODAY:  T&S      COVID19 testing scheduled on 10/28/21      ASSESSMENT and PLAN  Stephen Valerio is a 82 y/o male who presents for pre-operative H&P in preparation for ARTHROPLASTY, LEFT KNEE, TOTAL with Hermes Eddy MD on 11/1/21 at [facility] for treatment of Bilateral primary osteoarthritis of knee.    Pt has had prior anesthetic. Type: Regional.    No history of anesthetic complications        He has the following specific operative considerations:   # JEFF 2/8 = low risk  # VTE risk: 0.5%  # Risk of PONV score = 2.  If > 2, anti-emetic intervention recommended.  # Anesthesia considerations:  Refer to PAC assessment in anesthesia records      CARDIAC: METS  3 - Able to walk 1 block without stopping. Ambulates with cane due to knee pain     # RCRI : No  serious cardiac risks.  0.4% risk of major adverse cardiac event.       PULMONARY:     # Former smoker, 25 pk yr hx, quit 2005    # Denies asthma or inhaler use    GI:     # Denies GERD    /RENAL:     # Creatinine 1.17, GFR 59 on 10/5/21    ENDO: BMI 24    # DM2, A1c on 8/5/21 was 5.5. On jardiance.    # On daily prednisone for RA.  Consider stress dose in perioperative period, if indicated.      ORTHO:     # full ROM of neck    # Rheumatoid arthritis, has methotrexate Q Monday. Last dose 10/18/21.    # OA of knees, L>R    # Gout involving wrists      Patient is optimized and is acceptable candidate for the proposed procedure. No further diagnostic evaluation is needed.    Final plan per anesthesiologist on day of surgery.     Arrival time, NPO, shower and medication instructions provided by nursing staff today.  Preparing For Your Surgery handout given.      On the day of service:     Prep time: 15 minutes  Visit time: 25 minutes  Documentation time: 13 minutes  ------------------------------------------  Total time: 53 minutes      Liliam Davidson PA-C  Preoperative Assessment Center  [facility]  Clinic and Surgery Center  Phone: [phone]  Fax: [phone]

## 2021-10-21 NOTE — H&P (VIEW-ONLY)
Pre-Operative H & P     CC:  Preoperative exam to assess for increased cardiopulmonary risk while undergoing surgery and anesthesia.    Date of Encounter: 10/21/2021  Primary Care Physician:  Juliet Srinivasan     Reason for visit:   Encounter Diagnoses   Name Primary?     Bilateral primary osteoarthritis of knee Yes     Preop examination        HPI  Stephen Valerio is a 80 y/o male who presents for pre-operative H&P in preparation for ARTHROPLASTY, LEFT KNEE, TOTAL with Hermes Eddy MD on 11/1/21 at [facility] for treatment of Bilateral primary osteoarthritis of knee. Patient is being evaluated for comorbid conditions of rheumatoid arthritis, DM2, GERD, low back pain, gout.       Mr. Galindo Valerio has noticed years of left greater than right knee pain.  In the distant past he had some injections.  He is having daily pain with walking and sedentary activities.  The pain is limited to his knees. Imaging shows severe degenerative arthritis in both knees. He now presents for the above procedure.      History was obtained from patient via Malagasy iPad  & chart review. He is accompanied by two family members.        Hx of abnormal bleeding or anti-platelet use: denies    Menstrual history: No LMP for male patient.:      Prior to Admission Medications  Current Outpatient Medications   Medication Sig Dispense Refill     acetaminophen (TYLENOL) 325 MG tablet Take 325-650 mg by mouth every 6 hours as needed for mild pain       allopurinol (ZYLOPRIM) 300 MG tablet Take 1 tablet (300 mg) by mouth daily (Patient taking differently: Take 300 mg by mouth every morning ) 30 tablet 2     empagliflozin (JARDIANCE) 10 MG TABS tablet Take 2 tablets (20 mg) by mouth daily (Patient taking differently: Take 20 mg by mouth 2 times daily ) 60 tablet 3     metFORMIN (GLUCOPHAGE) 500 MG tablet Take 1 tablet (500 mg) by mouth 2 times daily (with meals) (Patient taking  differently: Take 500 mg by mouth every 7 days Wed) 120 tablet 11     methotrexate 2.5 MG tablet Take 8 tablets (20 mg) by mouth every 7 days (Patient taking differently: Take 20 mg by mouth every 7 days Monday) 32 tablet 2     predniSONE (DELTASONE) 5 MG tablet Take 1 tablet (5 mg) by mouth daily Take 1 tablet twice a day for 3 weeks (Patient taking differently: Take 5 mg by mouth 2 times daily Take 1 tablet twice a day for 3 weeks) 42 tablet 1     alcohol swab prep pads Use to swab area of injection/olga as directed. 100 each 6     blood glucose (ACCU-CHEK SOFTCLIX) lancing device Device to be used with lancets. 1 each 0     blood glucose (NO BRAND SPECIFIED) test strip Use to test blood sugar 1-2 times daily or as directed. Accu-Chek 100 strip 4     blood glucose monitoring (ACCU-CHEK MULTICLIX) lancets Use to test blood sugar 1-2 times daily. 102 each 11       Family History  Family History   Problem Relation Age of Onset     Diabetes Brother      Anesthesia Reaction No family hx of      Cardiovascular No family hx of      Deep Vein Thrombosis (DVT) No family hx of        The complete review of systems is negative other than noted in the HPI or here.       Anesthesia Pre-Procedure Evaluation    Patient: Stephen Valerio   MRN: [id number] : 1940        Preoperative Diagnosis: Bilateral primary osteoarthritis of knee [M17.0]    Procedure : Procedure(s):  ARTHROPLASTY, LEFT KNEE, TOTAL  PAC EVALUATION       Past Medical History:   Diagnosis Date     Arthritis      Chronic back pain      Gout      Hyperlipidemia LDL goal <130 2014      Past Surgical History:   Procedure Laterality Date     IRRIGATION AND DEBRIDEMENT HAND, COMBINED  2014    Procedure: COMBINED IRRIGATION AND DEBRIDEMENT HAND;  I&D Left Wrist;  Surgeon: Randy Perrin MD;  Location:  OR      No Known Allergies   Social History     Tobacco Use     Smoking status: Former Smoker     Smokeless tobacco: Never Used  "  Substance Use Topics     Alcohol use: No      Wt Readings from Last 1 Encounters:   10/21/21 73.9 kg (163 lb)             ROS/MED HX  The complete review of systems is negative other than noted in the HPI or here.  Patient denies recent illness, fever and respiratory infection during past month.    ENT/Pulmonary:     (+) tobacco use, Past use,  (-) asthma and sleep apnea   Neurologic:  - neg neurologic ROS  (-) no seizures and no CVA   Cardiovascular:     (+) Dyslipidemia -----    METS/Exercise Tolerance: 3 - Able to walk 1-2 blocks without stopping Comment: Ambulates with cane due to knee pain   Hematologic:    (-) history of blood clots   Musculoskeletal: Comment: Severe left knee arthritis, mild/mod in right knee    Gout - B/L wrists    Rheumatoid arthritis, taking methotrexate & prednisone  (+) arthritis,     GI/Hepatic:  - neg GI/hepatic ROS  (-) GERD and liver disease   Renal/Genitourinary: Comment: Creatinine 1.17, GFR 59 on 10/5/21      (+) renal disease, type: CRI,     Endo:     (+) type II DM, Not using insulin, Chronic steroid usage for Arthritis. Date most recently used: daily prednisone.     Psychiatric/Substance Use:  - neg psychiatric ROS     Infectious Disease:  - neg infectious disease ROS     Malignancy:  - neg malignancy ROS     Other:  - neg other ROS          Physical Exam    Airway        Mallampati: II   TM distance: > 3 FB   Neck ROM: full   Mouth opening: > 3 cm    Respiratory Devices and Support         Dental  Upper & lower dentures         Cardiovascular   cardiovascular exam normal          Pulmonary   pulmonary exam normal             PAC Discussion and Assessment    ASA Classification: 3  Case is suitable for: [facility]    /69 (BP Location: Left arm, Patient Position: Chair, Cuff Size: Adult Regular)   Pulse 89   Temp 97.9  F (36.6  C) (Oral)   Resp 20   Ht 1.727 m (5' 8\")   Wt 73.9 kg (163 lb)   SpO2 94%   BMI 24.78 kg/m           Physical Exam  Constitutional: Awake, " alert, cooperative, no apparent distress, and appears stated age. Seated in wheelchair; has cane also.  Eyes: Pupils equal, round and reactive to light, extra ocular muscles intact, sclera clear, conjunctiva normal.  HENT: Normocephalic, oral pharynx with moist mucus membranes, good dentition. No goiter appreciated. Upper & lower dentures in place.  Respiratory: Clear to auscultation bilaterally, no crackles or wheezing.   Cardiovascular: Regular rate and rhythm, normal S1 and S2, and no murmur noted.  Carotids +2, no bruits. No edema. Palpable pulses to radial, DP and PT arteries.   GI: Normal bowel sounds, soft, non-distended, non-tender, no masses palpated.    Lymph/Hematologic: No cervical lymphadenopathy and no supraclavicular lymphadenopathy.  Genitourinary:  deferred  Skin: Warm and dry.  No rashes.   Musculoskeletal: full ROM of neck. There is no redness, warmth, or swelling of the joints. Gross motor strength is normal.    Neurologic: Awake, alert, oriented to name, place and time. Cranial nerves II-XII are grossly intact. Gait not assessed. Neuropsychiatric: Calm, cooperative. Normal affect. Pleasant. Answers questions appropriately via , follows commands w/o difficulty.    PRIOR LABS/DIAGNOSTIC STUDIES:    All labs and imaging personally reviewed      CBC:   Lab Results   Component Value Date    WBC 11.0 10/12/2021    WBC 8.8 10/08/2021    HGB 14.2 10/12/2021    HGB 13.8 10/08/2021    HCT 42.3 10/12/2021    HCT 41.8 10/08/2021     10/12/2021     10/08/2021     BMP:   Lab Results   Component Value Date     10/05/2021     09/28/2021    POTASSIUM 4.3 10/05/2021    POTASSIUM 4.1 09/28/2021    CHLORIDE 102 10/05/2021    CHLORIDE 100 09/28/2021    CO2 28 10/05/2021    CO2 28 09/28/2021    BUN 33 (H) 10/05/2021    BUN 14 09/28/2021    CR 0.94 10/12/2021    CR 1.03 10/08/2021     (H) 10/05/2021     (H) 09/28/2021     COAGS:   Lab Results   Component Value Date     PTT 37 02/11/2014    INR 1.16 (H) 02/11/2014     POC:   Lab Results   Component Value Date     (H) 02/13/2014     HEPATIC:   Lab Results   Component Value Date    ALBUMIN 3.7 10/12/2021    PROTTOTAL 7.4 08/12/2021    ALT 18 10/12/2021    AST 9 10/12/2021    ALKPHOS 54 08/12/2021    BILITOTAL 0.4 08/12/2021     OTHER:   Lab Results   Component Value Date    LACT 1.0 02/11/2014    A1C 5.5 08/05/2021    BRADY 10.1 10/05/2021    PHOS 3.4 09/28/2021    MAG 1.3 (L) 02/13/2014    TSH 9.42 (H) 08/05/2021    T4 1.03 08/05/2021    CRP 11.0 (H) 08/12/2021    SED 42 (H) 08/12/2021     XR KNEE BILATERAL 3 VIEWS 9/8/2021   Impression:       1.  Severe tricompartmental degenerative arthritis in both knees. No   fracture or dislocation. Moderate right knee joint effusion.   Small-moderate left knee joint effusion. Ossified joint bodies are   present bilaterally.         EKG 2014   Sinus tachycardia    Inferior infarct , age undetermined    Abnormal ECG    No previous ECGs available   Ventricular rate 114 bpm          The patient's records and results personally reviewed by this provider.       LABS/DIAGNOSTIC STUDIES TODAY:  T&S      COVID19 testing scheduled on 10/28/21      ASSESSMENT and PLAN  Stephen Valerio is a 80 y/o male who presents for pre-operative H&P in preparation for ARTHROPLASTY, LEFT KNEE, TOTAL with Hermes Eddy MD on 11/1/21 at [facility] for treatment of Bilateral primary osteoarthritis of knee.    Pt has had prior anesthetic. Type: Regional.    No history of anesthetic complications        He has the following specific operative considerations:   # JEFF 2/8 = low risk  # VTE risk: 0.5%  # Risk of PONV score = 2.  If > 2, anti-emetic intervention recommended.  # Anesthesia considerations:  Refer to PAC assessment in anesthesia records      CARDIAC: METS  3 - Able to walk 1 block without stopping. Ambulates with cane due to knee pain     # RCRI : No  serious cardiac risks.  0.4% risk of major adverse cardiac event.       PULMONARY:     # Former smoker, 25 pk yr hx, quit 2005    # Denies asthma or inhaler use    GI:     # Denies GERD    /RENAL:     # Creatinine 1.17, GFR 59 on 10/5/21    ENDO: BMI 24    # DM2, A1c on 8/5/21 was 5.5. On jardiance.    # On daily prednisone for RA.  Consider stress dose in perioperative period, if indicated.      ORTHO:     # full ROM of neck    # Rheumatoid arthritis, has methotrexate Q Monday. Last dose 10/18/21.    # OA of knees, L>R    # Gout involving wrists      Patient is optimized and is acceptable candidate for the proposed procedure. No further diagnostic evaluation is needed.    Final plan per anesthesiologist on day of surgery.     Arrival time, NPO, shower and medication instructions provided by nursing staff today.  Preparing For Your Surgery handout given.      On the day of service:     Prep time: 15 minutes  Visit time: 25 minutes  Documentation time: 13 minutes  ------------------------------------------  Total time: 53 minutes      Liliam Davidson PA-C  Preoperative Assessment Center  [facility]  Clinic and Surgery Center  Phone: [phone]  Fax: [phone]

## 2021-11-01 PROBLEM — M17.12 OSTEOARTHRITIS OF LEFT KNEE, UNSPECIFIED OSTEOARTHRITIS TYPE: Status: ACTIVE | Noted: 2021-01-01

## 2021-11-01 NOTE — INTERVAL H&P NOTE
I have reviewed the surgical (or preoperative) H&P that is linked to this encounter, and examined the patient. Noted changes include: A1C 9.6 drawn day of H&P

## 2021-11-01 NOTE — PROGRESS NOTES
SPIRITUAL HEALTH SERVICES  [facility] [place] [place]   PRE-SURGERY VISIT    Had pre-surgery visit with pt and pt's son.  Provided spiritual support, prayer (the [employer] [facility] in [place]).     Roselia Alanis     Pager: [phone]

## 2021-11-01 NOTE — ANESTHESIA CARE TRANSFER NOTE
Patient: Stephen Valerio    Procedure: Procedure(s):  ARTHROPLASTY, LEFT KNEE, TOTAL       Diagnosis: Bilateral primary osteoarthritis of knee [M17.0]  Diagnosis Additional Information: No value filed.    Anesthesia Type:   General     Note:    Oropharynx: oropharynx clear of all foreign objects  Level of Consciousness: drowsy  Oxygen Supplementation: face mask    Independent Airway: airway patency satisfactory and stable  Dentition: dentition unchanged  Vital Signs Stable: post-procedure vital signs reviewed and stable  Report to RN Given: handoff report given  Patient transferred to: PACU    Handoff Report: Identifed the Patient, Identified the Reponsible Provider, Reviewed the pertinent medical history, Discussed the surgical course, Reviewed Intra-OP anesthesia mangement and issues during anesthesia, Set expectations for post-procedure period and Allowed opportunity for questions and acknowledgement of understanding      Vitals:  Vitals Value Taken Time   BP     Temp     Pulse     Resp     SpO2 92 % 11/01/21 1825   Vitals shown include unvalidated device data.    Electronically Signed By: JASMIN Cortez CRNA  November 1, 2021  6:26 PM

## 2021-11-01 NOTE — ANESTHESIA PROCEDURE NOTES
Adductor canal Procedure Note    Pre-Procedure   Staff -        Anesthesiologist:  Harley Mccain MD       Resident/Fellow: Paola Antunez MD       Performed By: resident       Location: pre-op       Pre-Anesthestic Checklist: patient identified, IV checked, site marked, risks and benefits discussed, informed consent, monitors and equipment checked, pre-op evaluation, at physician/surgeon's request and post-op pain management  Timeout:       Correct Patient: Yes        Correct Procedure: Yes        Correct Site: Yes        Correct Position: Yes        Correct Laterality: Yes        Site Marked: Yes  Procedure Documentation  Procedure: Adductor canal       Diagnosis: POST OPERATIVE PAIN       Laterality: left       Patient Position: supine       Patient Prep/Sterile Barriers: sterile gloves, mask       Skin prep: Chloraprep       Needle Type: short bevel       Needle Gauge: 21.        Needle Length (millimeters): 110        Ultrasound guided       1. Ultrasound was used to identify targeted nerve, plexus, vascular marker, or fascial plane and place a needle adjacent to it in real-time.       2. Ultrasound was used to visualize the spread of anesthetic in close proximity to the above referenced structure.       3. A permanent image is entered into the patient's record.    Assessment/Narrative         The placement was negative for: blood aspirated, painful injection and site bleeding       Paresthesias: No.     Bolus given via needle..        Secured via.        Insertion/Infusion Method: Single Shot       Complications: none       Injection made incrementally with aspirations every 5 mL.           Neurosurgery History & Physical    Patient ID: Cristopher Hollins is a 75 y.o. male.    Chief Complaint   Patient presents with    Back Pain     Chronic upper back pain for 3 months. Pain radiates down neck into left shoulder and into left arm. Nothing makes pain better. Lying down makes pain worse. Pain is constant. Has popping in neck.       Review of Systems   Constitutional: Negative for activity change, chills, fatigue and unexpected weight change.   HENT: Negative for hearing loss, tinnitus, trouble swallowing and voice change.    Eyes: Negative for visual disturbance.   Respiratory: Negative for apnea, chest tightness and shortness of breath.    Cardiovascular: Negative for chest pain and palpitations.   Gastrointestinal: Negative for abdominal pain, constipation, diarrhea, nausea and vomiting.   Genitourinary: Negative for difficulty urinating, dysuria and frequency.   Musculoskeletal: Positive for arthralgias and neck pain. Negative for back pain, gait problem and neck stiffness.   Skin: Negative for wound.   Neurological: Negative for dizziness, tremors, seizures, facial asymmetry, speech difficulty, weakness, light-headedness, numbness and headaches.   Psychiatric/Behavioral: Negative for confusion and decreased concentration.       Past Medical History:   Diagnosis Date    Cancer     bladder    Hypertension     Wears dentures     upper and lower bridge    Wears glasses      Social History     Social History    Marital status: Single     Spouse name: N/A    Number of children: N/A    Years of education: N/A     Occupational History    Not on file.     Social History Main Topics    Smoking status: Current Some Day Smoker     Packs/day: 1.00     Years: 28.00     Types: Cigarettes     Last attempt to quit: 4/14/2016    Smokeless tobacco: Never Used      Comment: cutting down on cigarettes    Alcohol use 1.2 - 1.8 oz/week     2 - 3 Cans of beer per week      Comment: couple times per week    Drug  use: No    Sexual activity: Yes     Partners: Female     Other Topics Concern    Not on file     Social History Narrative    No narrative on file     Family History   Problem Relation Age of Onset    Cancer Mother     Diabetes Sister      Review of patient's allergies indicates:  No Known Allergies    Current Outpatient Prescriptions:     amLODIPine (NORVASC) 5 MG tablet, Take 1 tablet (5 mg total) by mouth once daily., Disp: 90 tablet, Rfl: 3    diclofenac (VOLTAREN) 75 MG EC tablet, Take 1 tablet (75 mg total) by mouth 2 (two) times daily., Disp: 60 tablet, Rfl: 11    gabapentin (NEURONTIN) 300 MG capsule, 1 tab po bid and 2 tabs po qhs, Disp: 120 capsule, Rfl: 11    losartan (COZAAR) 50 MG tablet, Take 1 tablet (50 mg total) by mouth once daily., Disp: 90 tablet, Rfl: 3    omeprazole (PRILOSEC) 20 MG capsule, Take 1 capsule (20 mg total) by mouth once daily., Disp: 30 capsule, Rfl: 11    pravastatin (PRAVACHOL) 40 MG tablet, Take 1 tablet (40 mg total) by mouth once daily., Disp: 90 tablet, Rfl: 3    sildenafil, antihypertensive, (REVATIO) 20 mg Tab, 3 by mouth daily when necessary for sexual activity, Disp: 90 tablet, Rfl: 11  No current facility-administered medications for this visit.     Vitals:    03/14/18 1337   BP: (!) 134/58   BP Location: Right arm   Patient Position: Sitting   BP Method: Medium (Manual)   Pulse: 68   Weight: 78 kg (172 lb 1.1 oz)   Height: 6' (1.829 m)       Physical Exam   Constitutional: He is oriented to person, place, and time. He appears well-developed and well-nourished.   HENT:   Head: Normocephalic and atraumatic.   Eyes: Pupils are equal, round, and reactive to light.   Neck: Normal range of motion. Neck supple.   Cardiovascular: Normal rate.    Pulmonary/Chest: Effort normal.   Abdominal: He exhibits no distension.   Musculoskeletal: Normal range of motion. He exhibits no edema.   Neurological: He is alert and oriented to person, place, and time. He has a normal  Finger-Nose-Finger Test, a normal Heel to Shin Test, a normal Romberg Test and a normal Tandem Gait Test. Gait normal.   Reflex Scores:       Tricep reflexes are 2+ on the right side and 2+ on the left side.       Bicep reflexes are 2+ on the right side and 2+ on the left side.       Brachioradialis reflexes are 2+ on the right side and 2+ on the left side.       Patellar reflexes are 2+ on the right side and 2+ on the left side.       Achilles reflexes are 2+ on the right side and 2+ on the left side.  Skin: Skin is warm and dry.   Psychiatric: He has a normal mood and affect. His speech is normal and behavior is normal. Judgment and thought content normal.   Nursing note and vitals reviewed.      Neurologic Exam     Mental Status   Oriented to person, place, and time.   Oriented to person.   Oriented to place.   Oriented to time.   Follows 3 step commands.   Attention: normal. Concentration: normal.   Speech: speech is normal   Level of consciousness: alert  Knowledge: consistent with education.   Able to name object. Able to read. Able to repeat. Able to write. Normal comprehension.     Cranial Nerves     CN II   Visual acuity: normal  Right visual field deficit: none  Left visual field deficit: none     CN III, IV, VI   Pupils are equal, round, and reactive to light.  Right pupil: Size: 3 mm. Shape: regular. Reactivity: brisk. Consensual response: intact.   Left pupil: Size: 3 mm. Shape: regular. Reactivity: brisk. Consensual response: intact.   CN III: no CN III palsy  CN VI: no CN VI palsy  Nystagmus: none   Diplopia: none  Ophthalmoparesis: none  Conjugate gaze: present    CN V   Right facial sensation deficit: none  Left facial sensation deficit: none    CN VII   Right facial weakness: none  Left facial weakness: none    CN VIII   Hearing: intact    CN IX, X   CN IX normal.   CN X normal.     CN XI   Right sternocleidomastoid strength: normal  Left sternocleidomastoid strength: normal  Right trapezius  strength: normal  Left trapezius strength: normal    CN XII   Fasciculations: absent  Tongue deviation: none    Motor Exam   Muscle bulk: normal  Overall muscle tone: normal  Right arm pronator drift: absent  Left arm pronator drift: absent    Strength   Right neck flexion: 5/5  Left neck flexion: 5/5  Right neck extension: 5/5  Left neck extension: 5/5  Right deltoid: 5/5  Left deltoid: 5/5  Right biceps: 5/5  Left biceps: 5/5  Right triceps: 5/5  Left triceps: 5/5  Right wrist flexion: 5/5  Left wrist flexion: 5/5  Right wrist extension: 5/5  Left wrist extension: 5/5  Right interossei: 5/5  Left interossei: 5/  Right abdominals: 5/5  Left abdominals: 5/5  Right iliopsoas:   Left iliopsoas: 5  Right quadriceps: 5  Left quadriceps: /  Right hamstrin/5  Left hamstrin/5  Right glutei:   Left glutei:   Right anterior tibial:   Left anterior tibial:   Right posterior tibial: 5/5  Left posterior tibial: 5/  Right peroneal: 5/5  Left peroneal: 5  Right gastroc: /  Left gastroc:     Sensory Exam   Right arm light touch: normal  Left arm light touch: normal  Right leg light touch: normal  Left leg light touch: normal  Right arm vibration: normal  Left arm vibration: normal  Right arm pinprick: normal  Left arm pinprick: normal    Gait, Coordination, and Reflexes     Gait  Gait: normal    Coordination   Romberg: negative  Finger to nose coordination: normal  Heel to shin coordination: normal  Tandem walking coordination: normal    Tremor   Resting tremor: absent  Intention tremor: absent  Action tremor: absent    Reflexes   Right brachioradialis: 2+  Left brachioradialis: 2+  Right biceps: 2+  Left biceps: 2+  Right triceps: 2+  Left triceps: 2+  Right patellar: 2+  Left patellar: 2+  Right achilles: 2+  Left achilles: 2+  Right Youngblood: absent  Left Youngblood: absent  Right ankle clonus: absent  Left ankle clonus: absent      Provider dictation:  75 year old male is referred by Pippa  Long for evaluation of neck pain and cervical spondylosis.  He describes pain for the past 2-3 months into the posterior cervical and interscapular area radiating to the left side of the neck, left shoulder and left biceps region.  Left sided neck pain is the greatest.  He denies numbness and tingling.  He is currently taking voltaredn and gabapentin.  He has not had PT or GLADIS.  Oswestry score: 22%.  PHQ:  4.    On exam, he is neurologically intact wihtout any focal deficits.    I have reviewed an xray (2-6-18) and an MRI (2-14-18) of the cervical spine demonstrating extensive multi level degenerative changes with severe degenerative disk dessication from C3 to T1.  Facet arthropathy and disk/ osteophyte complexes contribute to significant bilateral foraminal narrowing at C3/4, mild foraminal narrowing at C4/5, significant right greater than left foraminal narrowing at C5/6 and bilateral foraminal narrowing at C6/7.    Mr. Hollins has multi level cervical spondylosis with cervicalgia and left arm radiculopathy.  His current symptoms are most likely referred pain from C3/4 facet arthropathy and left C4 radiculoapthy.  He does have severe spondylosis at C3/4, C4/5, C5/6, and C6/7.  He has not tried any treatment optinos thus far and I recommend both PT and trial of GLADIS prior to considering any surgical intervention.  Follow up in clinic if no improvement with PT and GLADIS.    Visit Diagnosis:  Osteoarthritis of spine with radiculopathy, cervical region  -     Ambulatory referral to Pain Clinic  -     Ambulatory Referral to Physical/Occupational Therapy    DDD (degenerative disc disease), cervical  -     Ambulatory referral to Pain Clinic  -     Ambulatory Referral to Physical/Occupational Therapy        Total time spent counseling greater than fifty percent of total visit time.  Counseling included discussion regarding imaging findings, diagnosis possibilities, treatment options, risks and benefits.   The patient had  many questions regarding the options and long-term effects.

## 2021-11-01 NOTE — OP NOTE
DATE OF SURGERY: 11/1/2021    PREOPERATIVE DIAGNOSIS: Left knee osteoarthritis    POSTOPERATIVE DIAGNOSIS: Left knee osteoarthritis    PROCEDURE: Left total knee arthroplasty    SURGEON: Hermes Kruger MD     ASSISTANT: MARIA DEL CARMEN Camacho MD    PATIENT HISTORY: The patient has a history of knee arthritis.  He has exhausted conservative means including activity modification over-the-counter medicines and injections.  He understands risks of bleed infection pain numbness tingling stiffness weakness limp the venous thrombosis and pulmonary embolism.    DESCRIPTION OF PROCEDURE: The patient had a blocker with successful induction of anesthesia and a spinal.  The left leg was washed and sterilely prepped and draped.  We made an anterior incision and did a median parapatellar approach.  Used intramedullary guide to take 8 mm off the distal femur.  I used an extra medullary guide to take 2 mm off the tibial plateau.  The medial tibial plateau was very cupped and so this point of being a larger than usual amount of bone off of the tibia.  Attention of the knee was stable in flexion and extension.  We sized the femur around a 6 and did the anterior posterior and chamfer cuts with the guide.  We sized the tibia and a 6 as well and instrumented the tibia for standard baseplate using a drop ruma get the rotation just right.  We cut the box for a PS knee placed trial implants.  The patient excepted a 14 mm implant and still had a little laxity in the knee.  We turned our attention to the patella.  I cut about 11 mm off the patella and replaced that with an 11 mm 38 mm patellar component.  Next removed all trial components cleaned and dried the bone ends and I cemented in the size 6 PS femur tibial baseplate and patellar component as noted.  We removed excess cement and reduced in a with a trial insert.  I then felt that a 16mm insert gave us full extension with minimal laxity and so we cleaned and dried  the knee and inserted a 6 mm PS insert and impacted in place without difficulty.  We reduced the knee irrigated placed a drain and closed.  We used #1 Vicryl in the quad tendon 0 Vicryl in the capsule 2-0 Vicryl in the peritenon and subcutaneous tissue followed by Monocryl in the skin Steri-Strips and a sterile dry dressing.  The patient was taken to the recovery room in stable condition.  There were no complications.  I was present for all critical portions of the procedure.  The estimated blood loss is 100 mL.    Hermes Kruger MD

## 2021-11-01 NOTE — BRIEF OP NOTE
[facility]    Brief Operative Note    Pre-operative diagnosis: Bilateral primary osteoarthritis of knee [M17.0]  Post-operative diagnosis Same as pre-operative diagnosis    Procedure: Procedure(s):  ARTHROPLASTY, LEFT KNEE, TOTAL  Surgeon: Surgeon(s) and Role:     * Hermes Kruger MD - Primary     * Mojgan Kramer PA-C - Assisting     * Caesar Duran MD - Resident - Assisting  Anesthesia: General with Adductor Block   Estimated Blood Loss: Less than 50 ml    Drains: Hemovac  Specimens: * No specimens in log *  Findings:   None.  Complications: None.  Implants:   Implant Name Type Inv. Item Serial No.  Lot No. LRB No. Used Action   IMP COMP PATELLA HOWM TRI 38 10MM 5551-L-381 - [id number] Total Joint Component/Insert IMP COMP PATELLA HOWM TRI 38 10MM 5551-L-381  [employer] [id number] Left 1 Implanted   IMP COMP FEM STRK TRIATHLN PS LT 6 5515-F-601 - [id number] Total Joint Component/Insert IMP COMP FEM STRK TRIATHLN PS LT 6 5515-F-601  [employer] [id number] Left 1 Implanted   IMP BASEPLATE TIBIAL HOWM TRI 6 5520-B-600 - [id number] Total Joint Component/Insert IMP BASEPLATE TIBIAL HOWM TRI 6 5520-B-600  [employer] [id number] Left 1 Implanted   BONE CEMENT RADIOPAQUE SIMPLEX HV FULL DOSE 6194-1-001 - [id number] Cement, Bone BONE CEMENT RADIOPAQUE SIMPLEX HV FULL DOSE 6194-1-001  [employer] [id number] Left 2 Implanted   IMP INSERT TIBIAL HOWM TRI SIZE 6 16MM 5532-G-616 - [id number] Total Joint Component/Insert IMP INSERT TIBIAL HOWM TRI SIZE 6 16MM 5532-G-616  [employer] [id number] Left 1 Implanted     Anesthesia:  Spinal with sedation and adductor canal block  Naik: none    Post-op Plan: Admit for PT, pain control and glucose monitoring post-op  WB status:  FWB  Device:  Hemovac Drain x 1-2 d  DVT Prophylaxis:  Lovenox x 40 mg subcutaneous x 4 wks  Follow-up:  2 weeks with Dr. Kruger/CONTRERAS for wound  check

## 2021-11-01 NOTE — LETTER
Recipient: Amber rincon [facility]- [facility]           Sender: Chelsi Dubose [phone]          Date: November 4, 2021  Patient Name:  Stephen Valerio  Routing Message:  Discharge orders        The documents accompanying this notice contain confidential information belonging to the sender.  This information is intended only for the use of the individual or entity named above.  The authorized recipient of this information is prohibited from disclosing this information to any other party and is required to destroy the information after its stated need has been fulfilled, unless otherwise required by state law.    If you are not the intended recipient, you are hereby notified that any disclosure, copy, distribution or action taken in reliance on the contents of these documents is strictly prohibited.  If you have received this document in error, please return it by fax to [phone] with a note on the cover sheet explaining why you are returning it (e.g. not your patient, not your provider, etc.).  If you need further assistance, please call .  Documents may also be returned by mail to [employer], , [address], [place], [place] [address].

## 2021-11-02 NOTE — ADDENDUM NOTE
Addendum  created 11/01/21 2107 by Ulises Samano DO    Attestation recorded in Intraprocedure, Child order released for a procedure order, Clinical Note Signed, Intraprocedure Attestations filed, Intraprocedure Blocks edited

## 2021-11-02 NOTE — PLAN OF CARE
"      VS: BP (!) 162/91 (BP Location: Left arm)   Pulse 74   Temp 97.6  F (36.4  C) (Axillary)   Resp 18   Ht 1.727 m (5' 7.99\")   Wt 74 kg (163 lb 2.3 oz)   SpO2 94%   BMI 24.81 kg/m    On capno   Output: Voiding spontaneously, no difficulty w/ urinal at bedside. Bladder scanned at 0000 for 138. LBM: prior to surgery  HV: 5mL   Lungs: Clear bilaterally, on 1 L O2 via NC. Denies chest pain or SOB.    Activity: Not OOB yet. Assist x 1 for repositioning. Using pillows to help w/ comfort. Passive movement device in use on LLE.    Skin: WDL ex knee incisions   Pain:   PRN Oxy and scheduled Tylenol given. Managed w/ repositioning and ice packs.   Neuro/CMS:   Disoriented to time. CMS intact, pt reports numbness that was present in legs after surgery has resolved.    Dressing(s):   ACE wrap on LLE - CDI   Diet:   Regular   LDA:   PIV - infusing LR @ 100 mL/hr  HV   Equipment:   IV pole, passive movement device   Plan:   Managed pain and encourage activity. Continue w/ POC.    Additional Info:   Pt speaks Nauruan -  iPad in room.       "

## 2021-11-02 NOTE — ANESTHESIA PROCEDURE NOTES
Intrathecal injection Procedure Note    Pre-Procedure   Staff -        Anesthesiologist:  Ulises Samano DO       Performed By: anesthesiologist       Location: OR       Procedure Start/Stop Times: 11/1/2021 4:00 PM and 11/1/2021 4:10 PM       Pre-Anesthestic Checklist: patient identified, IV checked, risks and benefits discussed, informed consent, monitors and equipment checked, pre-op evaluation, at physician/surgeon's request and post-op pain management  Procedure Documentation  Procedure: intrathecal injection       Patient Position: sitting       Skin prep: Betadine       Insertion Site: L3-4. (midline approach).       Needle Gauge: 22. No introducer used      # of attempts: 2 and  # of redirects:     Assessment/Narrative         CSF fluid: clear.    Medication(s) Administered   0.75% Hyperbaric Bupivacaine (Intrathecal), 1.6 mL

## 2021-11-02 NOTE — ANESTHESIA POSTPROCEDURE EVALUATION
Patient: Stephen Valerio    Procedure: Procedure(s):  ARTHROPLASTY, LEFT KNEE, TOTAL       Diagnosis:Bilateral primary osteoarthritis of knee [M17.0]  Diagnosis Additional Information: No value filed.    Anesthesia Type:  General    Note:     Postop Pain Control: Uneventful            Sign Out: Well controlled pain   PONV: No   Neuro/Psych: Uneventful            Sign Out: Acceptable/Baseline neuro status   Airway/Respiratory: Uneventful            Sign Out: Acceptable/Baseline resp. status   CV/Hemodynamics: Uneventful            Sign Out: Acceptable CV status; No obvious hypovolemia; No obvious fluid overload   Other NRE: NONE   DID A NON-ROUTINE EVENT OCCUR? No           Last vitals:  Vitals Value Taken Time   /79 11/01/21 1930   Temp 36.2  C (97.2  F) 11/01/21 1824   Pulse 60 11/01/21 1934   Resp 14 11/01/21 1934   SpO2 95 % 11/01/21 1934   Vitals shown include unvalidated device data.    Electronically Signed By: Ulises Samano DO  November 1, 2021  7:35 PM

## 2021-11-02 NOTE — PROGRESS NOTES
"   11/02/21 1627   Quick Adds   Type of Visit Initial Occupational Therapy Evaluation   Living Environment   People in home child(yoav), adult   Current Living Arrangements house   Home Accessibility stairs within home   Number of Stairs, Within Home, Primary 2   Stair Railings, Within Home, Primary none   Transportation Anticipated family or friend will provide   Living Environment Comments pt lives at home with his son and 3 daughters   Self-Care   Usual Activity Tolerance good   Current Activity Tolerance fair   Regular Exercise No   Equipment Currently Used at Home cane, straight;wheelchair, manual   Activity/Exercise/Self-Care Comment Family was assisting with most ADL   Instrumental Activities of Daily Living (IADL)   IADL Comments Family assists   Disability/Function   Hearing Difficulty or Deaf yes   Hearing Management [place]   Wear Glasses or Blind yes   Vision Management glasses   Concentrating, Remembering or Making Decisions Difficulty no   Difficulty Communicating no   Difficulty Eating/Swallowing no   Walking or Climbing Stairs Difficulty yes   Walking or Climbing Stairs ambulation difficulty, requires equipment   Dressing/Bathing Difficulty yes   Dressing/Bathing bathing difficulty, assistance 1 person   Toileting issues yes   Toileting Management assist from family   Doing Errands Independently Difficulty (such as shopping) yes   Errands Management family assists   Fall history within last six months yes   Number of times patient has fallen within last six months 3   Change in Functional Status Since Onset of Current Illness/Injury yes   General Information   Onset of Illness/Injury or Date of Surgery 11/01/21   Referring Physician Mojgan Kramer PA-C   Patient/Family Therapy Goal Statement (OT) to get stronger   Additional Occupational Profile Info/Pertinent History of Current Problem Per chart: \"Stephen Valerio is a 81 year old male with a history of RA, severe osteoarthritis, type " "2 DM, gout, and GERD admitted on 11/1, s/p left total knee arthroplasty with Dr. Kruger.  Internal medicine is consulted for assistance w/ post op medical management. \"   Existing Precautions/Restrictions fall   Left Lower Extremity (Weight-bearing Status) weight-bearing as tolerated (WBAT)   General Observations and Info Activity: up with A   Cognitive Status Examination   Orientation Status orientation to person, place and time   Affect/Mental Status (Cognitive) low arousal/lethargic   Safety Deficit impulsivity   Cognitive Status Comments Pt [place], needs  so communication difficult.  Pt is impulsive with poor safety awareness   Visual Perception   Visual Impairment/Limitations corrective lenses full-time   Sensory   Sensory Quick Adds No deficits were identified   Pain Assessment   Patient Currently in Pain Yes, see Vital Sign flowsheet   Integumentary/Edema   Integumentary/Edema Comments mild post op swelling   Range of Motion Comprehensive   General Range of Motion no range of motion deficits identified   Strength Comprehensive (MMT)   Comment, General Manual Muscle Testing (MMT) Assessment mild deconditioning   Bed Mobility   Comment (Bed Mobility) Min A for L LE   Transfers   Transfer Comments min A, max from low surface   Balance   Balance Comments min-mod A   Activities of Daily Living   BADL Assessment lower body dressing   Lower Body Dressing Assessment   [place] Level (Lower Body Dressing) dependent (less than 25% patient effort)   Clinical Impression   Criteria for Skilled Therapeutic Interventions Met (OT) yes;skilled treatment is necessary   OT Diagnosis decreased I with ADL   OT Problem List-Impairments impacting ADL activity tolerance impaired;cognition;communication;mobility;strength;post-surgical precautions   Assessment of Occupational Performance 3-5 Performance Deficits   Identified Performance Deficits dressing, bathing, toileting, transfers, IADL   Planned Therapy Interventions " (OT) ADL retraining;IADL retraining;bed mobility training;cognition;strengthening;transfer training;home program guidelines   Clinical Decision Making Complexity (OT) moderate complexity   Therapy Frequency (OT) 5x/week   Predicted Duration of Therapy 7 days   Anticipated Equipment Needs Upon Discharge (OT) shower chair   Risk & Benefits of therapy have been explained evaluation/treatment results reviewed;care plan/treatment goals reviewed;risks/benefits reviewed;current/potential barriers reviewed;participants voiced agreement with care plan;participants included;patient;son   Comment-Clinical Impression Pt presents with impaired ADL and functional mobility affecting safe d/c home.  Pt to benefit from skilled OT to address and maximize safety and I with ADL   OT Discharge Planning    OT Discharge Recommendation (DC Rec) Transitional Care Facility;Home with assist;home with home care occupational therapy   OT Rationale for DC Rec Pt below baseline, family expressing concerns about not being able to meet his needs at home.  Would benefit from ongoing rehab.  If pt were to d/c home recommend Ax1 and HH PT/OT/HHA   OT Brief overview of current status  Max A to stand from low toilet (ordered BSC), mod A for LOB while ambulating   Total Evaluation Time (Minutes)   Total Evaluation Time (Minutes) 8

## 2021-11-02 NOTE — PLAN OF CARE
"      VS:   /88 (BP Location: Left arm, Patient Position: Supine)   Pulse 72   Temp 97.6  F (36.4  C) (Axillary)   Resp 18   Ht 1.727 m (5' 7.99\")   Wt 74 kg (163 lb 2.3 oz)   SpO2 92%   BMI 24.81 kg/m    RA, on capno   Output:   Patient was straight cathed in PACU prior to admission at 2000, bladder scan at 0000  LBM prior to surgery.   Activity:   Patient had spinal in OR sensation in lower extremities still returning, rolled in bed assist x2   Skin: WDL ex. Incisions to L knee   Pain:   Patient pain controlled with Prn oxycodone, and scheduled tylenol   Neuro/CMS:   A&Ox3, disoriented to time, bilateral numbness in lower extremities from anesthesia, BLE strengths weak but returning as anesthesia wears off   Dressing(s):   CDI L knee   Diet:   Regular diet, with bg checks, slididng scale insulin   LDA:   PIV L hand infusing LR 100mL/hr, hemovac drain 110mL output   Equipment:   IV pole, call light, capno, personal belongings, call light, passive ROM device, PCDs   Plan:   Continue to moniotr continue plan of care, continue to mobilize, continue pain control   Additional Info:   Patient arrived to unit at 2100  Needs  Swedish speaking device in room  Bladder scan 0000 for retention, straight cathed around 2000 in PACU      Pt. admitted from PACU at 2100 via transport and transferred to bed 805. Pt. accompanied by PACU staff and arrived with personal belongings. Report was taken from Hilda CORNELL RN in PACU.  Pt. is A&Ox3 disoriented to time and VSS on RA. CMS numbness in BLE from spinal anesthesia. Pt. c/1/10 pain in L knee. Pt. denied nausea, CP, SOB, lightheadedness, or dizziness. LS clear and BS active. PIV patent and infusing LR at 100mL/hr.  Hemovac patent and draining to accordion suction.  Dressing to L knee cdi.  PCDs and TEDs in place to BLE.  Pt. oriented to the room and call light system.  Pt. educated on use and purpose of IS.   "

## 2021-11-02 NOTE — PROGRESS NOTES
[facility]    Medicine Progress Note - Hospitalist Service       Date of Admission:  11/1/2021    Assessment & Plan           Stephen Valerio is a 81 year old male with a history of RA, severe osteoarthritis, type 2 DM, gout, and GERD admitted on 11/1, s/p left total knee arthroplasty with Dr. Kruger.  Internal medicine is consulted for assistance w/ post op medical management.       # S/p L TKA  # Severe osteoarthritis   By Dr. Kruger.  Preop Hgb 12.1, EBL 100cc.   -Postop management primarily by orthopedic team.  -Pain control, DVT prophylaxis, activity, wound care etc. per primary.  -Follow hemoglobin for anemia of acute blood loss.  Transfuse for less than 7.  Hemoglobin 11/2: 11.5.  - Bowel regimen w/ senna and miralax, hold for loose stools   - PT, OT consults   -Encourage incentive spirometry.  -Monitor hemodynamics : Currently stable  -Postop delirium precautions.     # RA (upper extremity polyarthritis RF+, CCP -)   # Gout   Follows w/ Dr. Joe (Rheumatology), last seen in clinic 9/30/21.  On Methotrexate 20mg weekly on Mondays (increased on 9/30 from 15mg weekly) and Allopurinol 300mg daily.  Has been on Prednisone 5mg BID at least since 9/30 - pt says he is still taking this, but appears that he was only supposed to take for 3 weeks to cover for adjustment to increased Methotrexate dosing.  Received 2mg dexamethasone intraop.   - Hold Methotrexate while inpatient   - Continue Prednisone 5mg BID for now  - Monitor for hypotension - currently w/o s/s adrenal insufficiency   - Will need outpatient follow up with Rheumatology to determine timing/dose of resuming Methotrexate (hopefully this can be scheduled prior to discharge)      # Type 2 DM - Last Hgb A1c 9.6% on 10/21.  On Metformin 500mg at suppertime and Jardiance 20mg daily PTA.  Does not use insulin at home, but does check his sugars AM/HS.  BGs w/ mild hyperglycemia today, as below:      Recent Labs   Lab 11/02/21  1111 11/02/21  0857 11/02/21  0549 11/02/21  0048 11/01/21  1854 11/01/21  1235   * 195* 224* 270* 202* 148*     - Hold Jardiance for now, need to ensure adequate PO intake   - Hold Metformin for now given lactic acidosis  - May see transient hyperglycemia w/ steroids on board   - Ct MSSI   - BGs ac/hs for now   - Hypoglycemia protocol      # Stage III CKD - Preop Cr 0.85, c/w recent BL.  GFR 59-83 last 1 month.    - Minimize nephrotoxic medications       # GERD - Famotidine prn     Rest per primary.      The patient's care was discussed with the Bedside Nurse, Care Coordinator/, Patient and Primary team.    Chapo Tinajero MD  Hospitalist Service  [facility]  Securely message with the Vocera Web Console (learn more here)  Text page via [employer] Paging/Directory      ______________________________________________________________________    Interval History   Interval events reviewed.   States doing okay   Denies fever or chills.   No cough or cp or sob.   No LH or dizziness.   No NV or pain abdomen.   No new sensory or motor complaint.   No other new or acute medical concern      Data reviewed today: I reviewed all medications, new labs and imaging results over the last 24 hours. I personally reviewed no images or EKG's today.    Physical Exam   Vital Signs: Temp: 98.1  F (36.7  C) Temp src: Oral BP: 129/65 Pulse: 98   Resp: 18 SpO2: 95 % O2 Device: Nasal cannula Oxygen Delivery: 1 LPM  Weight: 163 lbs 2.25 oz    General Appearance: Awake, interactive, NAD  Respiratory: Normal work of breathing. CTA BL.   Cardiovascular: S1 S2 Regular  GI: Soft. NT. ND.  Extremities: Distally wwp.   Other: A0 x 4.  Grossly nonfocal.    Data   Recent Labs   Lab 11/02/21  1111 11/02/21  0857 11/02/21  0549 11/02/21  0048 11/01/21  2141 11/01/21  1235 11/01/21  1107   WBC  --   --  15.0*  --   --   --   --    HGB  --   --  11.5*  --   --   --   12.1*   MCV  --   --  102*  --   --   --   --    PLT  --   --  332  --   --   --   --    NA  --   --  136  --   --   --   --    POTASSIUM  --   --  3.9  --   --   --  3.6   CHLORIDE  --   --  103  --   --   --   --    CO2  --   --  27  --   --   --   --    BUN  --   --  17  --   --   --   --    CR  --   --  0.83  --  0.84  --  0.85   ANIONGAP  --   --  6  --   --   --   --    BRADY  --   --  8.9  --   --   --   --    * 195* 224*   < >  --    < >  --     < > = values in this interval not displayed.     Recent Results (from the past 24 hour(s))   XR Knee Port Left 1/2 Views    Narrative    EXAM: XR KNEE PORT LEFT 1/2 VIEWS  LOCATION: [facility]  DATE/TIME: 11/1/2021 7:05 PM    INDICATION: Post-op total knee  COMPARISON: None.      Impression    IMPRESSION: Unremarkable total knee arthroplasty placement, with overlying drainage tubing.     Medications     lactated ringers 100 mL/hr at 11/02/21 1200       acetaminophen  975 mg Oral Q8H     enoxaparin ANTICOAGULANT  40 mg Subcutaneous Q24H     insulin aspart  1-7 Units Subcutaneous TID AC     insulin aspart  1-5 Units Subcutaneous At Bedtime     polyethylene glycol  17 g Oral Daily     predniSONE  5 mg Oral BID     senna-docusate  1 tablet Oral BID     sodium chloride (PF)  3 mL Intracatheter Q8H

## 2021-11-02 NOTE — PHARMACY-ADMISSION MEDICATION HISTORY
Admission Medication History Completed by Pharmacy    See [facility] Admission Navigator for allergy information, preferred outpatient pharmacy, prior to admission medications and immunization status.     Medication History Sources:     Patient's daughter via  and outside fill history    Changes made to PTA medication list (reason):    Added: Metformin taper clarification (currently 2 tabs in AM and 1 in PM) and latanoprost opth. 1 drop daily.    Deleted: prednisone (course completed).    Changed: Jardiance one tab BID (was 2 QAM).    Additional Information:    None    Prior to Admission medications    Medication Sig Last Dose Taking? Auth Provider   acetaminophen (TYLENOL) 325 MG tablet Take 325-650 mg by mouth every 6 hours as needed for mild pain 10/31/2021 at PRN Yes Reported, Patient   allopurinol (ZYLOPRIM) 300 MG tablet Take 1 tablet (300 mg) by mouth daily  Patient taking differently: Take 300 mg by mouth every morning  11/1/2021 at 0800 Yes Shayne Joe MD   empagliflozin (JARDIANCE) 10 MG TABS tablet Take 10 mg by mouth 2 times daily  10/31/2021 at 1500 Yes Juliet Srinivasan MD   latanoprost (XALATAN) 0.005 % ophthalmic solution Place 1 drop into both eyes daily Past Week at Unknown time Yes Unknown, Entered By History   metFORMIN (GLUCOPHAGE) 500 MG tablet Patient tapering up to final dose 1,000 mg BID. Currently taking 1,000 mg in AM and 500 in PM 11/1/2021 at 0700 Yes Lauryn Goodman MD   methotrexate 2.5 MG tablet Take 20 mg by mouth every Monday 11/1/2021 at 1800 Yes Shayne Joe MD       Date completed: 11/02/21    Medication history completed by: Loki Espitia, Pharmacy Intern

## 2021-11-02 NOTE — CONSULTS
Internal Medicine Consult - Initial Visit       Stephen Valerio MRN# [id number]   YOB: 1940 Age: 81 year old   Date of Admission: 11/1/2021  PCP: Juliet Srinivasan  Date of Service: 11/1/2021    Referring Provider: Hermes Kruger MD  Reason for Consult: Postop medical management          Assessment and Recommendations:   Stephen Valerio is a 81 year old male with a history of RA, severe osteoarthritis, type 2 DM, gout, and GERD admitted on 11/1 for scheduled left total knee arthroplasty with Dr. Kruger.  Internal medicine is consulted for assistance w/ post op medical management.      # S/p L TKA  # Severe osteoarthritis   Surgery by Dr. Kruger.  Preop Hgb 12.1, EBL 100cc.  No complaints of pain this evening, feels comfortable.    - Check CBC, BMP, Mag, Phos in AM   - On Enoxaparin for ppx per primary, will need teaching for self-administration pending discharge plan  - Pain control: agree w/ scheduled APAP 975mg Q8H, PRN 650mg Q4H (no more than 4g/daily), Dilaudid 0.2-0.4mg IV Q2H PRN, Oxycodone 5-10mg Q4H PRN.    - Bowel regimen w/ senna and miralax, hold for loose stools   - PT, OT consults   - Follow up with Ortho outpatient as directed, likely   - Further management per primary team     # RA (upper extremity polyarthritis RF+, CCP -)   # Gout   Follows w/ Dr. Joe (Rheumatology), last seen in clinic 9/30/21.  On Methotrexate 20mg weekly on Mondays (increased on 9/30 from 15mg weekly) and Allopurinol 300mg daily.  Has been on Prednisone 5mg BID at least since 9/30 - pt says he is still taking this, but appears that he was only supposed to take for 3 weeks to cover for adjustment to increased Methotrexate dosing.  Received 2mg dexamethasone intraop.   - Hold Methotrexate while inpatient   - Continue Prednisone 5mg BID for now  - Monitor for hypotension - currently w/o s/s adrenal insufficiency   - Will need outpatient follow up with Rheumatology to determine timing/dose  of resuming Methotrexate (hopefully this can be scheduled prior to discharge)     # Type 2 DM - Last Hgb A1c 9.6% on 10/21.  On Metformin 500mg at suppertime and Jardiance 20mg daily PTA.  Does not use insulin at home, but does check his sugars AM/HS.  BGs w/ mild hyperglycemia today, as below:   Recent Labs   Lab 11/01/21  1854 11/01/21  1235 11/01/21  1032   * 148* 179*     - Hold Jardiance for now, need to ensure adequate PO intake   - Hold Metformin, resume at discharge   - May see transient hyperglycemia w/ steroids on board   - Start MSSI   - BGs ac/hs for now   - Hypoglycemia protocol in place      # Stage III CKD - Preop Cr 0.85, c/w recent BL.  GFR 59-83 last 1 month.    - Minimize nephrotoxic medications      # GERD - Documented in charting, but does not appear to be on any PTA medications.    - Can offer famotidine PRN if symptomatic       Recommendations reviewed with attending physician Dr. Jose Juan Kaba.       Corrie Aguirre, CNP, APRN  Internal Medicine VAN Hospitalist  [facility]  Pager [phone]           History of Present Illness:   History is obtained from the patient and medical record.     Stephen Valerio is a 81 year old male with a history of RA, severe osteoarthritis, type 2 DM, gout, and GERD admitted on 11/1 for scheduled left total knee arthroplasty with Dr. Kruger.  Internal medicine is consulted for assistance w/ post op medical management.        Stephen is resting comfortably in bed, seen with assistance of iPad .  He tells me that his pain is controlled at the moment.  He is feeling well.  Denies nausea and vomiting.  Denies chest pain and shortness of breath.  He says that his steroids were part of a taper, but that he is still taking.  He denies joint pain and swelling.  No fevers, chills, or abdominal pain.             Review of Systems:   A 10 point ROS was performed and negative unless otherwise noted in HPI.           Past Medical  History:   Reviewed and updated in Epic.  Past Medical History:   Diagnosis Date     Chronic back pain      Gout      Hyperlipidemia LDL goal <130 04/08/2014     Primary osteoarthritis of both knees      RA (rheumatoid arthritis) (H)              Past Surgical History:   Reviewed and updated in Epic.  Past Surgical History:   Procedure Laterality Date     IRRIGATION AND DEBRIDEMENT HAND, COMBINED  2/11/2014    Procedure: COMBINED IRRIGATION AND DEBRIDEMENT HAND;  I&D Left Wrist;  Surgeon: Randy Perrin MD;  Location:  OR             Social History:   Reviewed and updated in [facility].  Social History     Socioeconomic History     Marital status:      Spouse name: Not on file     Number of children: Not on file     Years of education: Not on file     Highest education level: Not on file   Occupational History     Not on file   Tobacco Use     Smoking status: Former Smoker     Smokeless tobacco: Never Used   Substance and Sexual Activity     Alcohol use: No     Drug use: No     Sexual activity: Never   Other Topics Concern     Parent/sibling w/ CABG, MI or angioplasty before 65F 55M? Not Asked   Social History Narrative     Not on file     Social Determinants of Health     Financial Resource Strain:      Difficulty of Paying Living Expenses:    Food Insecurity:      Worried About Running Out of Food in the Last Year:      Ran Out of Food in the Last Year:    Transportation Needs:      Lack of Transportation (Medical):      Lack of Transportation (Non-Medical):    Physical Activity:      Days of Exercise per Week:      Minutes of Exercise per Session:    Stress:      Feeling of Stress :    Social Connections:      Frequency of Communication with Friends and Family:      Frequency of Social Gatherings with Friends and Family:      Attends Sikh Services:      Active Member of Clubs or Organizations:      Attends Club or Organization Meetings:      Marital Status:    Intimate Partner Violence:      Fear of  "Current or Ex-Partner:      Emotionally Abused:      Physically Abused:      Sexually Abused:               Family History:   Reviewed and updated in Epic.  Family History   Problem Relation Age of Onset     Diabetes Brother      Anesthesia Reaction No family hx of      Cardiovascular No family hx of      Deep Vein Thrombosis (DVT) No family hx of              Allergies:   No Known Allergies          Medications:     Current Facility-Administered Medications   Medication     [START ON 11/4/2021] acetaminophen (TYLENOL) tablet 650 mg     acetaminophen (TYLENOL) tablet 975 mg     fentaNYL (PF) (SUBLIMAZE) injection 25 mcg     HYDROmorphone (DILAUDID) injection 0.2 mg    Or     HYDROmorphone (DILAUDID) injection 0.4 mg     HYDROmorphone (DILAUDID) injection 0.2 mg     labetalol (NORMODYNE/TRANDATE) injection 10 mg     lactated ringers infusion     naloxone (NARCAN) injection 0.2 mg    Or     naloxone (NARCAN) injection 0.4 mg    Or     naloxone (NARCAN) injection 0.2 mg    Or     naloxone (NARCAN) injection 0.4 mg     ondansetron (ZOFRAN-ODT) ODT tab 4 mg    Or     ondansetron (ZOFRAN) injection 4 mg     oxyCODONE (ROXICODONE) tablet 5 mg    Or     oxyCODONE IR (ROXICODONE) tablet 10 mg     oxyCODONE (ROXICODONE) tablet 5 mg     ropivacaine (NAROPIN) 300 mg, ketorolac (TORADOL) 30 mg, EPINEPHrine (ADRENALIN) 0.6 mg in sodium chloride 0.9 % 100 mL (ORTHO FABIOLA STANDARD DOSE)            Physical Exam:   Blood pressure (!) 145/76, pulse 59, temperature 97.6  F (36.4  C), temperature source Axillary, resp. rate 13, height 1.727 m (5' 7.99\"), weight 74 kg (163 lb 2.3 oz), SpO2 94 %.  Body mass index is 24.81 kg/m .    GENERAL: Alert and oriented x 3. Well nourished, well developed.  No acute distress.    HEENT: Normocephalic, atraumatic. Anicteric sclera. Mucous membranes moist.   CV: RRR. S1, S2. No murmurs appreciated.   RESPIRATORY: Effort normal on room air. Lungs CTAB with no wheezing, rales, or rhonchi.   GI: Abdomen " soft and non distended, bowel sounds present x all 4 quadrants. No tenderness, rebound, or guarding.   NEUROLOGICAL: No focal deficits. Follows commands.    MUSCULOSKELETAL: No joint swelling or tenderness. LLE in CPM.   EXTREMITIES: No gross deformities. No peripheral edema.   SKIN: Grossly warm, dry, and intact. No jaundice. No rashes.             Data:   I personally reviewed the following studies:    ROUTINE IP LABS (Last four results)  CMP Recent Labs   Lab 11/01/21  1235 11/01/21  1107 11/01/21  1032   POTASSIUM  --  3.6  --    *  --  179*   CR  --  0.85  --      CBC   Recent Labs   Lab 11/01/21  1107   HGB 12.1*     INR No lab results found in last 7 days.        Unresulted Labs Ordered in the Past 30 Days of this Admission     No orders found from 10/2/2021 to 11/2/2021.

## 2021-11-02 NOTE — PLAN OF CARE
"VS: /65 (BP Location: Right arm)   Pulse 98   Temp 98.1  F (36.7  C) (Oral)   Resp 18   Ht 1.727 m (5' 7.99\")   Wt 74 kg (163 lb 2.3 oz)   SpO2 95%   BMI 24.81 kg/m     Tried to wean off to room air but dipped to 88%   Currently 1L O2   Output: Voiding spontaneously, no difficulty w/ urinal at bedside.  LBM prior to surgery - bs audible  Hemovac had 195cc out day shift - patient pulled drain - ortho aware   Lungs: Clear bilaterally, on 1 L O2 via NC. Denies chest pain or SOB.    Activity: A1 with walker and gait belt up with PT  Passive movement device in use on LLE 30 degrees. Increased to 40 degrees but caused pain - down to 35 and patient was still uncomforable - currently at 30 degrees    Skin: WDL x knee incisions - ace wrap on - removed to assess per order   Pain:    PRN Oxy and scheduled Tylenol given. Cold compresses utilized. Early this evening patient reported pain not managed 1 hour after oxycodone administration - IV dilaudid administered.    Neuro/CMS:  Oriented x4 today. CMS intact, denies N/T today.   Dressing(s):    Knee dressing CDI - ACE wrap on LLE removed to assess - currently in place   Diet: Regular - encourage protein and fluids   LDA:  PIV - infusing LR @ 100 mL/hr   Equipment:    IV pole, passive movement device, call light, bed alarm   Plan:    Managed pain, encourage activity, encourage oral intake, Continue w/ POC. Likely to discharge home tomorrow with home care or family assistance.   Additional Info:    Pt speaks Cuban -  iPad in room.     1000mL LR bolus administered today for lactic 2.2.        1900 update - patient pulled 3rd IV today by accident despite soft \"no no\" splint in place when attempting to get OOB. Bed alarm on. Encourage fluids and consider placing another IV for continuous fluids.   "

## 2021-11-02 NOTE — OR NURSING
PACU to Inpatient Nursing Handoff    Patient Stephen Valerio is a 81 year old male who speaks Chinese.   Procedure Procedure(s):  ARTHROPLASTY, LEFT KNEE, TOTAL   Surgeon(s) Primary: Herems Kruger MD  Assisting: Mojgan Kramer PA-C  Resident - Assisting: Caesar Duran MD     No Known Allergies    Isolation  [unfilled]     Past Medical History   has a past medical history of Chronic back pain, Gout, Hyperlipidemia LDL goal <130 (04/08/2014), Primary osteoarthritis of both knees, and RA (rheumatoid arthritis) (H).    Anesthesia General with Adductor Block   Dermatome Level     Preop Meds TXA 1975mg - time given: 1058   Nerve block Adductor canal.  Location:left. Med:bupivacaine and decadron, precedex. Time given: 1225   Intraop Meds ondansetron (Zofran): last given at 1809   Local Meds No   Antibiotics cefazolin (Ancef) - last given at 1609     Pain Patient Currently in Pain: denies   PACU meds  Not applicable   PCA / epidural No   Capnography     Telemetry ECG Rhythm: Normal sinus rhythm   Inpatient Telemetry Monitor Ordered? No        Labs Glucose Lab Results   Component Value Date     11/01/2021     10/05/2021     03/03/2014       Hgb Lab Results   Component Value Date    HGB 12.1 11/01/2021    HGB 13.9 04/12/2014       INR Lab Results   Component Value Date    INR 1.16 02/11/2014      PACU Imaging Completed     Wound/Incision Incision/Surgical Site 02/11/14 Left Wrist (Active)   Number of days: 2820       Incision/Surgical Site 11/01/21 Anterior;Left;Midline Knee (Active)   Incision Assessment UT 11/01/21 1915   [place]-Incision Assessment UT 11/01/21 1915   Closure MELISSA 11/01/21 1915   Incision Drainage Amount None 11/01/21 1915   Dressing Intervention Clean, dry, intact 11/01/21 1915   Number of days: 0       Incision/Surgical Site 11/01/21 Left Knee (Active)   Incision Assessment UT 11/01/21 1915   [place]-Incision Assessment [facility] 11/01/21 1915   Closure  MELISSA 11/01/21 1915   Incision Drainage Amount None 11/01/21 1915   Dressing Intervention Clean, dry, intact 11/01/21 1915   Number of days: 0      CMS        Equipment ice pack and continuous passive motion machine (CPM)   Other LDA       IV Access Peripheral IV 11/01/21 Left Hand (Active)   Site Assessment WDL 11/01/21 1915   Line Status Infusing 11/01/21 1915   Phlebitis Scale 0-->no symptoms 11/01/21 1915   Infiltration Scale 0 11/01/21 1915   Number of days: 0      Blood Products Not applicable EBL 50 mL   Intake/Output Date 11/01/21 0700 - 11/02/21 0659   Shift 8368-6912 2020-2238 4746-9207 24 Hour Total   INTAKE   P.O.  50  50   I.V.  1000  1000   Shift Total(mL/kg)  1050(14.19)  1050(14.19)   OUTPUT   Urine 300 860  1160   Shift Total(mL/kg) 300(4.05) 860(11.62)  1160(15.68)   Weight (kg) 74 74 74 74      Drains / Naik Closed/Suction Drain 1 Left;Anterior Knee Accordion 10 Faroese (Active)   Site Description UTV 11/01/21 1930   Drainage Appearance Bloody/Bright Red 11/01/21 1930   Status To bulb suction 11/01/21 1930   Number of days: 0      Time of void PreOp Void Prior to Procedure: 1030 (11/01/21 1118)    PostOp Voided (mL): 300 mL (11/01/21 1200)  Straight cath: 860 mL (11/01/21 1930)    Diapered? No   Bladder Scan Bladder Scan Volume (mL): 720 ml (11/01/21 1930)   PO 50 mL (11/01/21 1845)  water     Vitals    B/P: 136/87  T: 97.2  F (36.2  C)    Temp src: Axillary  P:  Pulse: 60 (11/01/21 1945)          R: 16  O2:  SpO2: 97 %    O2 Device: Nasal cannula (11/01/21 1915)    Oxygen Delivery: 2 LPM (11/01/21 1915)         Family/support present son   Patient belongings     Patient transported on bed   DC meds/scripts (obs/outpt) Not applicable   Inpatient Pain Meds Released? Yes       Special needs/considerations Hebrew speaking,  used   Tasks needing completion None       Hilda Ryan RN  [facility] [id number]

## 2021-11-02 NOTE — PROGRESS NOTES
Sepsis Evaluation Progress Note    I was called to see Stephen Valerio due to abnormal vital signs triggering the Sepsis SIRS screening alert. He is not known to have an infection.     Physical Exam   Vital Signs:  Temp: 97.6  F (36.4  C) Temp src: Oral BP: (!) 143/77 Pulse: 98   Resp: 22 SpO2: 95 % O2 Device: Nasal cannula Oxygen Delivery: 1 LPM     General: in no acute distress  Mental Status: baseline mental status.     Remainder of physical exam is significant for unremarkable.     Data   Lactic Acid   Date Value Ref Range Status   11/02/2021 2.2 (H) 0.7 - 2.0 mmol/L Final   02/11/2014 1.0 0.7 - 2.1 mmol/L Final       Assessment & Plan   NO EVIDENCE OF SEPSIS at this time.  Vital sign, physical exam, and lab findings are due to likely hypovolemia. . and/or metformin.   Will try 1L RL bolus.   Hold metformin.   Ct to monitor.     Disposition: The patient will remain on the current unit. We will continue to monitor this patient closely..  Chapo Tinajero MD    Sepsis Criteria   Sepsis: 2+ SIRS criteria due to infection  Severe Sepsis: Sepsis AND 1+ new sign of acute organ dysfunction (Note: lactate >2 or acute encephalopathy each qualify as organ dysfunction)  Septic Shock: Sepsis AND hypotension despite volume resuscitation with 30 ml/kg crystalloid or lactate >=4  Note: HYPOTENSION is defined as 2 BP readings measured 3 hrs apart that have a SBP <90, MAP <65, or decrease >40 mmHg, occurring 6 hrs before or after t-zero

## 2021-11-02 NOTE — PROGRESS NOTES
11/02/21 0854   Quick Adds   Type of Visit Initial PT Evaluation       Present yes   Language Azerbaijani   Living Environment   People in home child(yoav), adult   Current Living Arrangements house   Home Accessibility stairs within home   Number of Stairs, Within Home, Primary 2   Stair Railings, Within Home, Primary none   Transportation Anticipated family or friend will provide   Living Environment Comments pt lives at home with his son and 3 daughters   Self-Care   Usual Activity Tolerance good   Current Activity Tolerance fair   Regular Exercise No   Equipment Currently Used at Home cane, straight;wheelchair, manual   Activity/Exercise/Self-Care Comment pt typically uses a cane and w/c at home   Disability/Function   Hearing Difficulty or Deaf yes   Patient's preferred means of communication    Use of hearing assistive devices none   Were auxiliary aids offered? no   Hearing Management [place]   Wear Glasses or Blind yes   Vision Management glasses   Concentrating, Remembering or Making Decisions Difficulty no   Difficulty Communicating no   Difficulty Eating/Swallowing no   Walking or Climbing Stairs Difficulty yes   Walking or Climbing Stairs ambulation difficulty, requires equipment;ambulation difficulty, assistance 1 person   Mobility Management cane and w/c   Dressing/Bathing Difficulty no   Toileting issues no   Doing Errands Independently Difficulty (such as shopping) yes   Errands Management family assist   Fall history within last six months yes   Number of times patient has fallen within last six months 3   Change in Functional Status Since Onset of Current Illness/Injury yes   General Information   Onset of Illness/Injury or Date of Surgery 11/01/21   Referring Physician Hermes Kruger MD   Patient/Family Therapy Goals Statement (PT) did not express   Pertinent History of Current Problem (include personal factors and/or comorbidities that impact the POC) Pt s/p  L TKA on 11/1/21 following hx of severe L knee OA   Existing Precautions/Restrictions fall  (No pillow under left knee)   Weight-Bearing Status - LUE full weight-bearing   Weight-Bearing Status - RUE full weight-bearing   Weight-Bearing Status - LLE weight-bearing as tolerated   Weight-Bearing Status - RLE full weight-bearing   Heart Disease Risk Factors Dislipidemia   General Observations Pt supine in bed upon arrival. Pt GABBY drain appears to be leaking with significant amount of blood present on bed. Nursing staff present to assist and changed linens. Pt son arrives during session.   Cognition   Orientation Status (Cognition) oriented x 4   Affect/Mental Status (Cognition) confused   Follows Commands (Cognition) 25-49% accuracy   Cognitive Status Comments Pt does not understand commands when instructing in HEP.   Pain Assessment   Patient Currently in Pain No   Integumentary/Edema   Integumentary/Edema other (describe)   Integumentary/Edema Comments did not observe due to ACE wrap over surgical site   Posture    Posture Not impaired   Range of Motion (ROM)   ROM Quick Adds ROM deficits secondary to surgical procedure;ROM deficits secondary to pain;ROM deficits secondary to swelling;ROM deficits secondary to weakness   Strength   Manual Muscle Testing Quick Adds Deficits observed during functional mobility;Able to perform L SLR   Strength Comments pt demonstrates significnat extensor lag with L SLR   Bed Mobility   Bed Mobility supine-sit;sit-supine   Rolling Left [place] (Bed Mobility) independent   Scooting/Bridging [place] (Bed Mobility) independent   Supine-Sit [place] (Bed Mobility) modified independence   Sit-Supine [place] (Bed Mobility) modified independence   Bed Mobility Limitations decreased ability to use legs for bridging/pushing;other (see comments)  (pain and post surgical status)   Impairments Contributing to Impaired Bed Mobility decreased flexibility;pain;decreased ROM;decreased  strength   Comment (Bed Mobility) Pt completes supine to sit mod I with use of UE to lower L LE.   Transfers   Transfers sit-stand transfer   Transfer Safety Concerns Noted decreased weight-shifting ability   Impairments Contributing to Impaired Transfers decreased ROM;decreased strength   Transfer Safety Comments pt completes sit-stand transfer with supervision. Decreased eccentric control with stand <>sit   Sit-Stand Transfer   Sit-Stand [place] (Transfers) supervision   Assistive Device (Sit-Stand Transfers) walker, standard   Sit/Stand Transfer Comments decreased eccentric control   Gait/Stairs (Locomotion)   [place] Level (Gait) contact guard   Assistive Device (Gait) walker, standard   Distance in Feet (Required for LE Total Joints) 25'   Pattern (Gait) step-to   Deviations/Abnormal Patterns (Gait) stride length decreased;weight shifting decreased;gait speed decreased;moe decreased;antalgic;base of support, wide;left sided deviations   Left Sided Gait Deviations heel strike decreased   Maintains Weight-bearing Status (Gait) able to maintain   Negotiation (Stairs) other (see comments)  (did not observe)   Comment (Gait/Stairs) Pt amb 25' with FWW and close CGA. Expresses fatigue following 10' of amb. Exhibits steady gait when initiated, but unsteady and shaky with turn/when returning to bed.   Balance   Balance other (describe)   Balance Comments pt appears unsteady while turning and when ambulating back to bed   Sensory Examination   Sensory Perception WFL   Coordination   Coordination no deficits were identified   Muscle Tone   Muscle Tone no deficits were identified   Clinical Impression   Criteria for Skilled Therapeutic Intervention yes, treatment indicated   PT Diagnosis (PT) decreased ROM, joint effusion, decreased activity tolerance secondary to post-surgical status   Influenced by the following impairments s/p L TKA on 11/1/21   Functional limitations due to impairments decreased gait  tolerance, impaired bed mobility/transfers   Clinical Presentation Stable/Uncomplicated   Clinical Presentation Rationale pt demonstrates stable vitals and no significant change in status throughout session.   Clinical Decision Making (Complexity) low complexity   Therapy Frequency (PT) 2x/day   Predicted Duration of Therapy Intervention (days/wks) 3 days   Planned Therapy Interventions (PT) balance training;gait training;home exercise program;strengthening;stair training;ROM (range of motion);transfer training   Anticipated Equipment Needs at Discharge (PT) walker, standard   Risk & Benefits of therapy have been explained evaluation/treatment results reviewed;care plan/treatment goals reviewed;risks/benefits reviewed;current/potential barriers reviewed;participants voiced agreement with care plan;participants included;patient   PT Discharge Planning    PT Discharge Recommendation (DC Rec) home with assist;home with outpatient physical therapy   PT Rationale for DC Rec Anticipate pt will be safe to return home with assist in 1-2 days. Pt has son and daughters at home to assist.    PT Brief overview of current status  mod I with bed mobility, CGA and FWW with ambulation, supervision for transfers, stairs not yet observed   Total Evaluation Time   Total Evaluation Time (Minutes) 9

## 2021-11-02 NOTE — PROGRESS NOTES
"RN Progress Note    Vital Signs: BP (!) 143/77 (BP Location: Left arm)   Pulse 98   Temp 97.6  F (36.4  C) (Oral)   Resp 22   Ht 1.727 m (5' 7.99\")   Wt 74 kg (163 lb 2.3 oz)   SpO2 95%   BMI 24.81 kg/m      Diet: Regular, tolerating well.    Intake/Output: Spontaneously voids. Eating well. Hemovac - 195cc output. Drain was accidentally pulled out by pt.    Activity: OOB with PT in the AM, pt tolerated activity well. Assist x1 with gait belt and walker. OOB to use restroom. Resting between cares.    Pain: Pain well managed with PRN oxycodone, scheduled tylenol, and cold compress. No signs of increased distress or discomfort.    Skin: WDL ex knee incisions.    Neuro: Alert, oriented to person, place, time. Disoriented to situation.    Respiratory: 1L O2 via nasal cannula.    Lines/Drains/Airways: Pt pulled PIV in right and left hand. Pt pulled hemovac drain. New 22G PIV placed in L arm - patent. Foam guard placed around IV.    Equipment: IV pole, passive movement device, walker, gait belt, SCDs.    Plan: Pain management. Encourage activity. Continue plan of care.    Additional Notes: Pt presents with L TKA, post op day 1. Vitals WNL. 1L O2 via nasal cannula - sat 95%. Pain rated at 8/10, PRN oxycodone 5mg administered. Pt trigger sepsis protocol. STAT LACTIC 2.2. Per MD, no code sepsis was called. 1L LR fluid bolus administered. Monitored vitals closely.     Accepting of all cares and treatments. Primary language - Nigerian.  utilized. All questions and concerns addressed. No further concerns at this time.      "

## 2021-11-02 NOTE — PROGRESS NOTES
"  Orthopaedic Surgery Daily Progress Note     Subjective: Stephen Valerio is a 81 year old male POD # 1 s/p left TKA  Pain is well controlled. Tolerating an oral diet.   No fever, chills. No chest pain or shortness of breath. No abdominal pain, nausea, vomiting. No diarrhea or constipation. No urinary difficulties.     Physical Exam   /79 (BP Location: Left arm)   Pulse 99   Temp 97.7  F (36.5  C) (Oral)   Resp 16   Ht 1.727 m (5' 7.99\")   Wt 74 kg (163 lb 2.3 oz)   SpO2 92%   BMI 24.81 kg/m      Intake/Output Summary (Last 24 hours) at 11/2/2021 0742  Last data filed at 11/2/2021 0553  Gross per 24 hour   Intake 1441 ml   Output 2350 ml   Net -909 ml     General: Alert, well-appearing in no acute distress.  Respiratory: Non-labored breathing.   Cardiovascular: Extremities warm and well perfused   Extremities: moving all four extremities.   LLE:  -Sens: SILT dp/sp/s/s/t  -Motor: 5/5 EHL/FHL/TA/GSc  -Vasc: 2+ pulses, wwp, brisk cap refill    Dressing CDI    Skin: As noted above. No rashes or lesions appreciated.    Drain 100 mL immediately after OR then 5 mL since then    Labs    Complete Blood Count   Recent Labs   Lab 11/02/21  0549 11/01/21  1107   WBC 15.0*  --    HGB 11.5* 12.1*     --      Basic Metabolic Panel  Recent Labs   Lab 11/02/21  0549 11/02/21  0048 11/01/21  2141 11/01/21  1854 11/01/21  1235 11/01/21  1107 11/01/21  1032     --   --   --   --   --   --    POTASSIUM 3.9  --   --   --   --  3.6  --    CHLORIDE 103  --   --   --   --   --   --    CO2 27  --   --   --   --   --   --    BUN 17  --   --   --   --   --   --    CR 0.83  --  0.84  --   --  0.85  --    * 270*  --  202* 148*  --    < >    < > = values in this interval not displayed.     Coagulation Profile  No lab results found in last 7 days.    Assessment/Plan:  Assessment and Plan:  Stephen Valerio is a 81 year old male with PMH of  RA, DM2 s/p left TKA on 11/1 with Dr. Kruger      Post-op " Plan: Admit for PT, pain control and glucose monitoring post-op  WB status:  FWB  Device:  Hemovac Drain x 1-2 d  DVT Prophylaxis:  Lovenox x 40 mg subcutaneous x 4 wks  Follow-up:  2 weeks with Dr. Kruger/CONTRERAS for wound check    Caesar Duran MD   Orthopaedic Surgery Resident, PGY-4  P: [phone]

## 2021-11-03 NOTE — PLAN OF CARE
"VS: /61 (BP Location: Right arm)   Pulse 94   Temp 99.7  F (37.6  C) (Oral)   Resp 18   Ht 1.727 m (5' 7.99\")   Wt 74 kg (163 lb 2.3 oz)   SpO2 95%   BMI 24.81 kg/m       Output: Voiding spontaneously, no difficulty w/ urinal at bedside. LBM: 11   Lungs: Clear bilaterally, on 1 L O2 via NC. Denies chest pain or SOB.    Activity: Assist x 1 for repositioning. Sat at edge of bed multiple times to use urinal. Using pillows to help w/ comfort. Passive movement device in room, but pt did not want to use overnight.   Skin: WDL ex knee incisions   Pain:    PRN Oxy and scheduled Tylenol given. Managed w/ repositioning and ice packs.   Neuro/CMS:    A & O x4. CMS intact, denies N/T.    Dressing(s):    ACE wrap on LLE - CDI   Diet:    Regular   LDA:    PIV - infusing LR @ 100 mL/hr   Equipment:    IV pole, passive movement device   Plan:    Managed pain and encourage activity. Continue w/ POC. Discharge home today w/ home care.   Additional Info:    Pt speaks Persian -  iPad in room.   Bed alarm on for safety.  0200 B       "

## 2021-11-03 NOTE — CONSULTS
Care Management Initial Consult    General Information  Assessment completed with: patient,daughter Antonieta at bedside, fausto and granddaughter on phone.       Primary Care Provider verified and updated as needed:   Verified current PCP  Readmission within the last 30 days:   No        Advance Care Planning:     Not addressed       Communication Assessment  Patient's communication style: spoken language (non-English)    Hearing Difficulty or Deaf: yes   Wear Glasses or Blind: yes    Cognitive  Cognitive/Neuro/Behavioral: WDL  Level of Consciousness: alert  Arousal Level: opens eyes spontaneously  Orientation: oriented x 4  Mood/Behavior: calm, cooperative  Best Language: 0 - No aphasia  Speech: clear, spontaneous    Living Environment:   People in home: lives with adult  son     Current living Arrangements: house      Able to return to prior arrangements:  No       Family/Social Support:  Care provided by:  Self with assistance of some ADL's from son  Provides care for:                  Description of Support System:    Good family support       Current Resources:   Patient receiving home care services:  No     Community Resources:    Equipment currently used at home: cane, straight, wheelchair, manual  Supplies currently used at home:  None    Employment/Financial:  Employment Status:     Retired     Financial Concerns:      NO       Lifestyle & Psychosocial Needs:  Social Determinants of Health     Tobacco Use: Medium Risk     Smoking Tobacco Use: Former Smoker     Smokeless Tobacco Use: Never Used   Alcohol Use:      Frequency of Alcohol Consumption:      Average Number of Drinks:      Frequency of Binge Drinking:    Financial Resource Strain:      Difficulty of Paying Living Expenses:    Food Insecurity:      Worried About Running Out of Food in the Last Year:      Ran Out of Food in the Last Year:    Transportation Needs:      Lack of Transportation (Medical):      Lack of Transportation (Non-Medical):    Physical  Activity:      Days of Exercise per Week:      Minutes of Exercise per Session:    Stress:      Feeling of Stress :    Social Connections:      Frequency of Communication with Friends and Family:      Frequency of Social Gatherings with Friends and Family:      Attends Sikh Services:      Active Member of Clubs or Organizations:      Attends Club or Organization Meetings:      Marital Status:    Intimate Partner Violence:      Fear of Current or Ex-Partner:      Emotionally Abused:      Physically Abused:      Sexually Abused:    Depression: Not at risk     PHQ-2 Score: 0   Housing Stability:      Unable to Pay for Housing in the Last Year:      Number of Places Lived in the Last Year:      Unstable Housing in the Last Year:        Functional Status:  Prior to admission patient needed assistance:       Some assistance with ADL's  Uses assistive device for mobilization       Mental Health Status:        WNL    Chemical Dependency Status:            No issues noted    Values/Beliefs:  Spiritual, Cultural Beliefs, Sikh Practices, Values that affect care: No                Additional Information:  This RNCC met with patient, daughter and OT at bedside. Introduced role of RNCC aas related to DC planning. Family in agreement that patient should go to  Rehab before coming home. Patient lives with son and his wife, son works but daughter in law is at home, but would be difficult for her to assist with mobilization and toileting. OT leans also towards TCU, patient has not been seen yet by PT. Family has no preference in TCU providers but would like to stay [place] of [facility] as patient lives in [place]. Family would like ride set up at discharge, patient has Medicaid in addition to Medicare.    Referral to:  [facility]  [address] [facility] [address]  -Declined as NO BEDS   [facility] [address]    Felice Corley  [address]  [facility] [address] [phone]  2:23 PM Left  for  Abiola/admissions      2:12 PM Additional Referrals sent:  [facility]  [address] [place] [address] [phone]    [facility]  [address] [place] [address] [phone]  2:20 PM Left VM message for admissions as follow up    Damián Padilla  [id number] [facility] [address] [phone]    Patient has been vaccinated    [facility]  Is able to take patient, had concerns about patient med (Jardiance as this would be cost  Prohibitive) Aydee in admissions states patient can bring his from home. RNCC spoke with discharging med team physician, patient does not need to discharge on this medication, can put on ho/d and use insulin sliding scale. No visitors allowed at this time until all COVID results from resident screening are in.  This information was imparted to Celeste (patient granddaughter)    This RNCC spoke with daughter Kane at bedside, was directed to contact [facility] at  regarding transport and room type.    Per phone call from [facility] , will take a semi private room as a private room would be $45.60 per day, would like us to set up wheelchair transportation.    W/C ride with [facility]  For 11:45AM    Contacted Aydee @ [facility] informed her patient ride set for 11:45 AM.    (SW completing PAS)    Need COVID results in the AM    Kenyatta HEADN RN CCM  RN Care Coordinator 8A  [address] [place] [address]  [place] [email or web address]   Office: [phone]   Pager: [phone]    To contact Weekend RNCC, dial [phone] and enter job code [phone] at prompt. This pager can not be contacted by text page or outside line.

## 2021-11-03 NOTE — PROGRESS NOTES
[facility]    Medicine Progress Note - Hospitalist Service       Date of Admission:  11/1/2021    Assessment & Plan           Stephen Valerio is a 81 year old male with a history of RA, severe osteoarthritis, type 2 DM, gout, and GERD admitted on 11/1, s/p left total knee arthroplasty with Dr. Kruger.  Internal medicine is consulted for assistance w/ post op medical management.       # S/p L TKA  # Severe osteoarthritis   By Dr. Kruger.  Preop Hgb 12.1, EBL 100cc.   -Postop management primarily by orthopedic team.  -Pain control, DVT prophylaxis, activity, wound care etc. per primary.  -Follow hemoglobin for anemia of acute blood loss.  Transfuse for less than 7.  Hemoglobin 11/2: 11.5--> 11/03: 10.5.   -Bowel regimen w/ senna and miralax, hold for loose stools   -PT, OT consults   -Encourage incentive spirometry.  -Monitor hemodynamics : Currently stable  -Postop delirium precautions: ao x 4 now.      # RA (upper extremity polyarthritis RF+, CCP -)   # Gout   Follows w/ Dr. Joe (Rheumatology), last seen in clinic 9/30/21.  On Methotrexate 20mg weekly on Mondays (increased on 9/30 from 15mg weekly) and Allopurinol 300mg daily.  Has been on Prednisone 5mg BID at least since 9/30 - pt says he is still taking this, but appears that he was only supposed to take for 3 weeks to cover for adjustment to increased Methotrexate dosing.  Received 2mg dexamethasone intraop.   - Hold Methotrexate while inpatient   - Continue Prednisone 5mg BID for now  - Monitor for hypotension - currently w/o s/s adrenal insufficiency   - Will need outpatient follow up with Rheumatology to determine timing/dose of resuming Methotrexate (hopefully this can be scheduled prior to discharge)      # Type 2 DM - Last Hgb A1c 9.6% on 10/21.  On Metformin 500mg at suppertime and Jardiance 20mg daily PTA.  Does not use insulin at home, but does check his sugars AM/HS.  BGs w/ mild hyperglycemia  today, as below:     Recent Labs   Lab 11/03/21  1219 11/03/21  0638 11/03/21  0257 11/02/21  2151 11/02/21  1646 11/02/21  1111   * 215* 228* 214* 240* 202*     - Hold Jardiance for now, need to ensure adequate PO intake   - Hold Metformin for now given lactic acidosis  - May see transient hyperglycemia w/ steroids on board   - Ct MSSI   - BGs ac/hs for now   - Hypoglycemia protocol      # Stage III CKD - Preop Cr 0.85, c/w recent BL.  GFR 59-83 last 1 month.    - Minimize nephrotoxic medications       # GERD - Famotidine prn     Rest per primary.      The patient's care was discussed with the Bedside Nurse, Care Coordinator/, Patient and Primary team.    Chapo Tinajero MD  Hospitalist Service  [facility]  Securely message with the Vocera Web Console (learn more here)  Text page via [employer] Paging/Directory      ______________________________________________________________________    Interval History   Interval events reviewed.   States doing okay   Denies fever or chills.   No cough or cp or sob.   No NV or pain abdomen.   No new sensory or motor complaint.   No other new or acute medical concern    Talked w/ help of ipad [place] interpretor.   Daughter at bedside.     Data reviewed today: I reviewed all medications, new labs and imaging results over the last 24 hours. I personally reviewed no images or EKG's today.    Physical Exam   Vital Signs: Temp: 98.6  F (37  C) Temp src: Oral BP: 125/59 Pulse: 90   Resp: 16 SpO2: 92 % O2 Device: None (Room air) Oxygen Delivery: 1 LPM  Weight: 163 lbs 2.25 oz    General Appearance: Awake, interactive, NAD  Respiratory: Normal work of breathing. CTA BL.   Cardiovascular: S1 S2 Regular  GI: Soft. NT. ND.  Extremities: Distally wwp.   Other: A0 x 4.  Grossly nonfocal.    Data   Recent Labs   Lab 11/03/21  1219 11/03/21  0638 11/03/21  0257 11/02/21  0857 11/02/21  0549 11/02/21  0048 11/01/21  2141  11/01/21  1235 11/01/21  1107   WBC  --   --   --   --  15.0*  --   --   --   --    HGB  --  10.5*  --   --  11.5*  --   --   --  12.1*   MCV  --   --   --   --  102*  --   --   --   --    PLT  --   --   --   --  332  --   --   --   --    NA  --   --   --   --  136  --   --   --   --    POTASSIUM  --   --   --   --  3.9  --   --   --  3.6   CHLORIDE  --   --   --   --  103  --   --   --   --    CO2  --   --   --   --  27  --   --   --   --    BUN  --   --   --   --  17  --   --   --   --    CR  --   --   --   --  0.83  --  0.84  --  0.85   ANIONGAP  --   --   --   --  6  --   --   --   --    BRADY  --   --   --   --  8.9  --   --   --   --    * 215* 228*   < > 224*   < >  --    < >  --     < > = values in this interval not displayed.     No results found for this or any previous visit (from the past 24 hour(s)).  Medications     lactated ringers Stopped (11/03/21 1048)       acetaminophen  975 mg Oral Q8H     enoxaparin ANTICOAGULANT  40 mg Subcutaneous Q24H     insulin aspart  1-7 Units Subcutaneous TID AC     insulin aspart  1-5 Units Subcutaneous At Bedtime     polyethylene glycol  17 g Oral Daily     predniSONE  5 mg Oral BID     senna-docusate  1 tablet Oral BID     sodium chloride (PF)  3 mL Intracatheter Q8H

## 2021-11-03 NOTE — PROGRESS NOTES
"  Orthopaedic Surgery Daily Progress Note     Subjective: Stephen Valerio is a 81 year old male POD #2 s/p left TKA  Patient reports that his pain is well controlled through , but appears to grimace with movement. Tolerating an oral diet. No fever, chills. No chest pain or shortness of breath. No abdominal pain, nausea, vomiting. No diarrhea or constipation. No urinary difficulties.     Physical Exam   /59 (BP Location: Right arm)   Pulse 90   Temp 98.6  F (37  C) (Oral)   Resp 16   Ht 1.727 m (5' 7.99\")   Wt 74 kg (163 lb 2.3 oz)   SpO2 96%   BMI 24.81 kg/m      Intake/Output Summary (Last 24 hours) at 11/2/2021 0742  Last data filed at 11/2/2021 0553  Gross per 24 hour   Intake 1441 ml   Output 2350 ml   Net -909 ml     General: Alert, well-appearing in no acute distress.  Respiratory: Non-labored breathing.   Cardiovascular: Extremities warm and well perfused   Extremities: moving all four extremities.   LLE:  -Sens: SILT dp/sp/s/s/t  -Motor: 5/5 EHL/FHL/TA/GSc  -Vasc: 2+ pulses, wwp, brisk cap refill    Dressing CDI    Skin: As noted above. No rashes or lesions appreciated.    Drain removed by patient yesterday    Labs    Complete Blood Count   Recent Labs   Lab 11/02/21  0549 11/01/21  1107   WBC 15.0*  --    HGB 11.5* 12.1*     --      Basic Metabolic Panel  Recent Labs   Lab 11/03/21  0257 11/02/21  2151 11/02/21  1646 11/02/21  1111 11/02/21  0857 11/02/21  0549 11/02/21  0048 11/01/21  2141 11/01/21  1235 11/01/21  1107   NA  --   --   --   --   --  136  --   --   --   --    POTASSIUM  --   --   --   --   --  3.9  --   --   --  3.6   CHLORIDE  --   --   --   --   --  103  --   --   --   --    CO2  --   --   --   --   --  27  --   --   --   --    BUN  --   --   --   --   --  17  --   --   --   --    CR  --   --   --   --   --  0.83  --  0.84  --  0.85   * 214* 240* 202*   < > 224*   < >  --    < >  --     < > = values in this interval not displayed. "     Coagulation Profile  No lab results found in last 7 days.    Assessment/Plan:  Assessment and Plan:  Stephen Valerio is a 81 year old male with PMH of  RA, DM2 s/p left TKA on 11/1 with Dr. Kruger      Post-op Plan: Admit for PT, pain control and glucose monitoring post-op  WB status:  FWB  Device:  Hemovac Drain x 1-2 d  DVT Prophylaxis:  Lovenox x 40 mg subcutaneous x 4 wks  Follow-up:  2 weeks with Dr. Kruger/CONTRERAS for wound check    Caesar Duran MD   Orthopaedic Surgery Resident, PGY-4  P: [phone]

## 2021-11-03 NOTE — PLAN OF CARE
"/59 (BP Location: Right arm)   Pulse 90   Temp 98.6  F (37  C) (Oral)   Resp 16   Ht 1.727 m (5' 7.99\")   Wt 74 kg (163 lb 2.3 oz)   SpO2 92%   BMI 24.81 kg/m      Bengali speaking,  iPad in room. Aox4, CMS intact ex trace edema to LLE.   Pt was lethargic this morning, received 5mg of oxy around 6am. Able to rouse with assistance of . 5mg of oxy given again at 1330, patient and his daughter instructed on the sedating effects of narcotics on the respiratory system while encouraging IS use. Patient verbalized understanding.    LS clear, on RA, off O2%. Maintains O2% 90-92%. OK per medicine.   LBM 11/1. Incontinent of urine this AM, bed linens changed, mary lou care done and new gown and brief placed. Pt able to stand at the EOB walk walker/gb while linens changed, stood for 1-2 minutes.   With , RN explained to patient the importance of the CPM machine.   Skin wnl ex incision, UTV incision, drsg CDI. Ace bandage on. Some old drainage on the CPM machine. PIV L arm SL.   Regular diet, BG checks before meals. BGs 215, 232. sliding scale coverage with novolog. Denies nausea, ate chicken soup that his daughter brought from home for lunch.   Plan: TCU facility before home. RNCC has sent referrals to TCU facilities. Daughter updated and aware. Pt will not be discharging today. Continue POC.       "

## 2021-11-03 NOTE — PROGRESS NOTES
"VS: /65 (BP Location: Right arm)   Pulse 98   Temp 98.8  F (37.1  C)   Resp 18   Ht 1.727 m (5' 7.99\")   Wt 74 kg (163 lb 2.3 oz)   SpO2 95%   BMI 24.81 kg/m   pt denies CP or SOB.    O2:  sat. > 90%. NC 1 L   Output: Voiding adequate amount in Urinal   Last BM: 11/1/21 Commode by bedside.   Activity: AX 1 with walker and gait belt   Skin: Intact. Ex L knee Ace wrap.   Pain: Managed with Oxycodone and Tylenol   CMS: CMS and neuro intact. A/O X 4. Able to make needs known. Denies N/T   Dressing: CDI. Ace wrap   Diet: Tolerating regular diet.   LDA: PIV R infusing LR   Equipment: IV pole,walker, gait belt. Urinal, commode and personal belongings.   Plan: Continue with plan of care. Call light within reach.   Additional Info:       "

## 2021-11-03 NOTE — DISCHARGE SUMMARY
[facility] Orthopaedic Surgery Discharge Summary    Stephen Valerio MRN# [id number]   Age: 81 year old YOB: 1940       Date of Admission:  11/1/2021  Date of Discharge::  11/3/2021   Admitting Physician:  Hermes Kruger MD  Discharge To:  Home   Primary Care Physician:      Juliet Srinivasan          Admission Diagnoses:   Bilateral primary osteoarthritis of knee [M17.0]  Osteoarthritis of left knee, unspecified osteoarthritis type [M17.12]         Discharge Diagnosis:   Bilateral primary osteoarthritis of knee [M17.0]  Osteoarthritis of left knee, unspecified osteoarthritis type [M17.12]           Procedures Performed:   11/1/2021: Left TKA         Consultations:   INTERNAL MEDICINE ADULT IP CONSULT FOR [facility]  PHYSICAL THERAPY ADULT IP CONSULT  OCCUPATIONAL THERAPY ADULT IP CONSULT          Brief History:   The patient has a history of knee arthritis.  He has exhausted conservative means including activity modification over-the-counter medicines and injections.  He understands risks of bleed infection pain numbness tingling stiffness weakness limp the venous thrombosis and pulmonary embolism.         Hospital Course:   Patient did well post operatively.  Transitioned from iv medication to oral meds.  Tolerated diet, resumed normal bowel and bladder function.  Was seen by PT/OT prior to discharge.  Recommended TCU and was discharged there on POD3.          Medications Prior to Admission:     Medications Prior to Admission   Medication Sig Dispense Refill Last Dose     acetaminophen (TYLENOL) 325 MG tablet Take 325-650 mg by mouth every 6 hours as needed for mild pain   10/31/2021 at PRN     alcohol swab prep pads Use to swab area of injection/olga as directed. 100 each 6 supplies     allopurinol (ZYLOPRIM) 300 MG tablet Take 1 tablet (300 mg) by mouth daily (Patient taking differently: Take 300 mg by mouth every morning ) 30 tablet 2 11/1/2021 at 0800     blood glucose  (ACCU-CHEK SOFTCLIX) lancing device Device to be used with lancets. 1 each 0 supplies     blood glucose (NO BRAND SPECIFIED) test strip Use to test blood sugar 1-2 times daily or as directed. Accu-Chek 100 strip 4 supplies     blood glucose monitoring (ACCU-CHEK MULTICLIX) lancets Use to test blood sugar 1-2 times daily. 102 each 11 supplies     empagliflozin (JARDIANCE) 10 MG TABS tablet Take 2 tablets (20 mg) by mouth daily (Patient taking differently: Take 10 mg by mouth 2 times daily ) 60 tablet 3 10/31/2021 at 1500     latanoprost (XALATAN) 0.005 % ophthalmic solution Place 1 drop into both eyes daily   Past Week at Unknown time     metFORMIN (GLUCOPHAGE) 500 MG tablet Take 1 tablet (500 mg) by mouth 2 times daily (with meals) (Patient taking differently: Take 500 mg by mouth 2 times daily (with meals) Patient tapering up to final dose 1,000 mg BID. Currently taking 1,000 mg in AM and 500 in PM) 120 tablet 11 11/1/2021 at 0700     methotrexate 2.5 MG tablet Take 8 tablets (20 mg) by mouth every 7 days (Patient taking differently: Take 20 mg by mouth every 7 days Monday) 32 tablet 2 11/1/2021 at 1800            Discharge Medications:        Review of your medicines        UNREVIEWED medicines. Ask your doctor about these medicines        Dose / Directions   acetaminophen 325 MG tablet  Commonly known as: TYLENOL      Dose: 325-650 mg  Take 325-650 mg by mouth every 6 hours as needed for mild pain  Refills: 0     allopurinol 300 MG tablet  Commonly known as: ZYLOPRIM  Used for: Chronic gout of knee, unspecified cause, unspecified laterality      Dose: 300 mg  Take 1 tablet (300 mg) by mouth daily  Quantity: 30 tablet  Refills: 2     empagliflozin 10 MG Tabs tablet  Commonly known as: Jardiance  Used for: Elevated fasting blood sugar      Dose: 20 mg  Take 2 tablets (20 mg) by mouth daily  Quantity: 60 tablet  Refills: 3     latanoprost 0.005 % ophthalmic solution  Commonly known as: XALATAN      Dose: 1  drop  Place 1 drop into both eyes daily  Refills: 0     metFORMIN 500 MG tablet  Commonly known as: GLUCOPHAGE  Used for: Elevated fasting blood sugar      Dose: 500 mg  Take 1 tablet (500 mg) by mouth 2 times daily (with meals)  Quantity: 120 tablet  Refills: 11     methotrexate 2.5 MG tablet  Used for: Inflammatory arthritis      Dose: 20 mg  Take 8 tablets (20 mg) by mouth every 7 days  Quantity: 32 tablet  Refills: 2            CONTINUE these medicines which have NOT CHANGED        Dose / Directions   alcohol swab prep pads  Used for: Elevated fasting blood sugar      Use to swab area of injection/olga as directed.  Quantity: 100 each  Refills: 6     blood glucose lancing device  Used for: Elevated fasting blood sugar      Device to be used with lancets.  Quantity: 1 each  Refills: 0     blood glucose monitoring lancets  Used for: Elevated fasting blood sugar      Use to test blood sugar 1-2 times daily.  Quantity: 102 each  Refills: 11     blood glucose test strip  Commonly known as: NO BRAND SPECIFIED  Used for: Elevated fasting blood sugar      Use to test blood sugar 1-2 times daily or as directed. Accu-Chek  Quantity: 100 strip  Refills: 4                 Pending Results at Discharge:   None         Discharge Instructions:     Discharge Procedure Orders   Walker DME   Order Comments: DME Documentation: Describe the reason for need to support medical necessity: Impaired gait status post knee surgery. I, the undersigned, certify that the above prescribed supplies are medically necessary for this patient and is both reasonable and necessary in reference to accepted standards of medical practice in the treatment of this patient's condition and is not prescribed as a convenience.     Order Specific Question Answer Comments   DME Provider: [facility]    Walker Type: Standard (2 Wheel)    Accessories: N/A      Assign Questionnaire Series to Patient     Future Appointments   Date Time Provider Department  [place]   11/3/2021 10:00 AM Bree Armando, OT [facility] [place]   11/3/2021 11:15 AM Nieves Shell Pt, PT [facility] [place]   11/3/2021  3:00 PM Nieves Shell Pt, PT [facility] [place]   11/12/2021  2:30 PM EC REGISTERED DIETICIAN [facility]   11/17/2021 12:15 PM Hermes Kruger MD [facility]   12/2/2021  3:30 PM [facility]    1/13/2022 10:15 AM [facility]   1/13/2022 11:00 AM Logan Velasquez MD [facility]   1/21/2022  3:00 PM Lauryn Goodman MD [facility]     Caesar Duran MD PGY-4   Orthopaedic Surgery Resident   Pager [phone]

## 2021-11-04 NOTE — PLAN OF CARE
"VSS. Tmax 99.2, tylenol given as scheduled.   LS clear, maintaining O2 >92% on RA. IS encouraged.   Up with Ao1 with gb/walker. Voiding adequate amts, incontinent in brief but does occasionally use the urinal.   LBM prior to surgery, received all bowel meds this AM. +flatus, BSx4.   Skin wnl ex L knee incision, UTV incision. Knee to ankle wrapped in ace wrap. CPM on from 0800 to 1030, off for therapies. CMS intact ex for trace edema to bilat lower extremeties. Hx RA, R hand fine dexterity limited d/t arthritis/disfiguration of digits. L hand movement intact.    Pain managed with scheduled tylenol. Oxy prn, last received at 0600. Declined oxycodone this AM. When reassessed at 1030, pt said \"I have no pain!\" but requested ice packs, which were given prn.   UTV L knee incision, Ace wrap intact. PIV removed this AM.   Regular diet with sliding scale coverage, 1U novolog given before breakfast.   RN utilized  services to explain to family that the patient would be leaving at 11:45. RN assisted patient in getting dressed and ensured all belongings were sent with patient. EMS arrived at 1135. RN checked with RNCC to ensure that the CPM machine would be going with the pt to the TCU. This was confirmed by Beverly WARNER; CPM sent with patient. Daughter updated. Pt left with his daughter, his belongings, and IATF sheets. He was transported off the unit via EMS and left the unit at 1215, discharging to TCU.   "

## 2021-11-04 NOTE — DISCHARGE INSTRUCTIONS
HOLD METHOTREXATE UNTIL OKAY TO RESUME BY ORTHOPEDIC / RHEUMATOLOGY.     HOLD JARDIANCE WHILE IN TCU. RESUME AFTER DISCHARGE HOME.

## 2021-11-04 NOTE — PROGRESS NOTES
Care Management Discharge Note    Discharge Date: 11/03/2021       Discharge Disposition:      Discharge Services:      Discharge DME:      Discharge Transportation: wheelchair [facility]    Private pay costs discussed: transportation costs    PAS Confirmation Code:  [id number]  Patient/family educated on Medicare website which has current facility and service quality ratings:      Education Provided on the Discharge Plan:  Yes by RNCC  Persons Notified of Discharge Plans: RNCC notified medical team, family, pt.  Patient/Family in Agreement with the Plan:  yes    Handoff Referral Completed: Yes    Additional Information:  Pt will discharge to [facility]. Pt's ride was set up yesterday by RNCC through [facility] for today at 11:45 am for wheelchair ride. DAMON spoke with Aydee in admissions and SW asked if she would like pt's covid test results to be faxed. Aydee stated that she can see them through [employer]. DAMON completed PAS. Discharge orders and scripts were sent to facility.      DOLLY Rivera  8A   Ph [phone]  Pager [phone]

## 2021-11-04 NOTE — PROGRESS NOTES
Care Management Follow Up    Length of Stay (days): 3    Expected Discharge Date: 11/04/2021     Additional Information:  CPM machine sent with patient to [facility] [address]. Vanessa at [facility] notified. Phone [phone]. RNCC available as needed.      Beverly Luther RN, BSN  Care Coordinator, 5 Ortho  Phone [phone]  Pager [phone]

## 2021-11-04 NOTE — PLAN OF CARE
"    VS: /64 (BP Location: Right arm, Patient Position: Supine)   Pulse 100   Temp 99.4  F (37.4  C) (Oral)   Resp 14   Ht 1.727 m (5' 7.99\")   Wt 74 kg (163 lb 2.3 oz)   SpO2 92%   BMI 24.81 kg/m     RA   Output: Incontinent of urine, linen changed 1x, brief on  Pt voided 500 mL into urinal in AM   LBM 11/1   Activity: Ax2 for repoistioning   Skin: L knee incision   Pain: Denied pain   Neuro/CMS: A&Ox4   Dressing(s): L knee incision CDI   Diet: regular   LDA: L PIV SL   Equipment: IV pole, CPM   Plan: Continue POC, discharge to TCU on 11/4   Additional Info: Frisian speaking -  needed  CPM on 0130-0600       "

## 2021-11-04 NOTE — PLAN OF CARE
Occupational Therapy Discharge Summary    Reason for therapy discharge:    Discharged to transitional care facility.    Progress towards therapy goal(s). See goals on Care Plan in [facility] electronic health record for goal details.  Goals partially met.  Barriers to achieving goals:   discharge from facility.    Therapy recommendation(s):    Continued therapy is recommended.  Rationale/Recommendations:  Continued therapy is recommended to increase independence with ADLs and decrease burden of care.

## 2021-11-04 NOTE — PROGRESS NOTES
[facility]  INITIAL VISIT NOTE  November 5, 2021    PRIMARY CARE PROVIDER AND CLINIC:  Juliet Srinivasan [address] / [place] [address]    CHIEF COMPLAINT:  Hospital follow-up/Initial visit    HPI:    Stephen Valerio is a 81 year old  (1940) male who was seen at [place] [facility] on November 5, 2021 for an initial visit.     Medical history is notable for DM type II, dyslipidemia, CKD stage II, gout, glaucoma, rheumatoid arthritis, chronic back pain, and osteoarthritis.    Summary of hospital course:  Patient was hospitalized at [facility] from November 1 through November 4, 2021 for planned left total knee arthroplasty.  The surgery was achieved on November 1, 2021 and EBL was less than 50 mL.  He did well postoperatively.  Hemoglobin dropped to 10.5 from initial 12.1.  TCU was recommended per therapies.    Patient is admitted to this facility for medical management, nursing care, and rehab.     Of note, history was obtained from patient, facility RN, and extensive review of the chart necessitated by complex hospitalization.    Today's visit:  Patient was seen in his room, while sitting on the edge of the bed.  He is accompanied by his son who served as  as patient is only Swedish-speaking.  He feels comfortable.  He rates his left knee surgical pain at 6 out of 10.  He denies fever, chills, chest pain, palpitation, dyspnea, nausea, vomiting, abdominal pain, or urinary symptoms.  He reports a bowel movement yesterday.    CODE STATUS:   CPR/Full code     PAST MEDICAL HISTORY:   DM type II  Dyslipidemia  CKD stage II, baseline creatinine around 0.9  Gout  Rheumatoid arthritis (RF+, CCP-), patient follows with Dr. Shayne Joe of rheumatology  Osteoarthritis  Chronic back pain  Glaucoma      Past Medical History:   Diagnosis Date     Chronic back pain      Gout      Hyperlipidemia LDL goal <130 04/08/2014     Primary osteoarthritis of both  knees      RA (rheumatoid arthritis) (H)        PAST SURGICAL HISTORY:   Past Surgical History:   Procedure Laterality Date     ARTHROPLASTY KNEE Left 11/1/2021    Procedure: ARTHROPLASTY, LEFT KNEE, TOTAL;  Surgeon: Hermes Kruger MD;  Location: [facility] OR     IRRIGATION AND DEBRIDEMENT HAND, COMBINED  2/11/2014    Procedure: COMBINED IRRIGATION AND DEBRIDEMENT HAND;  I&D Left Wrist;  Surgeon: Randy Perrin MD;  Location: [facility] OR       FAMILY HISTORY:   Family History   Problem Relation Age of Onset     Diabetes Brother      Anesthesia Reaction No family hx of      Cardiovascular No family hx of      Deep Vein Thrombosis (DVT) No family hx of        SOCIAL HISTORY:  Social History     Tobacco Use     Smoking status: Former Smoker     Smokeless tobacco: Never Used   Substance Use Topics     Alcohol use: No       MEDICATIONS:  Current Outpatient Medications   Medication Sig Dispense Refill     acetaminophen (TYLENOL) 325 MG tablet Take 2 tablets (650 mg) by mouth every 4 hours as needed for other 60 tablet 0     alcohol swab prep pads Use to swab area of injection/olga as directed. 100 each 6     allopurinol (ZYLOPRIM) 300 MG tablet Take 1 tablet (300 mg) by mouth daily (Patient taking differently: Take 300 mg by mouth every morning ) 30 tablet 2     blood glucose (ACCU-CHEK SOFTCLIX) lancing device Device to be used with lancets. 1 each 0     blood glucose (NO BRAND SPECIFIED) test strip Use to test blood sugar 1-2 times daily or as directed. Accu-Chek 100 strip 4     blood glucose monitoring (ACCU-CHEK MULTICLIX) lancets Use to test blood sugar 1-2 times daily. 102 each 11     empagliflozin (JARDIANCE) 10 MG TABS tablet Take 2 tablets (20 mg) by mouth daily 60 tablet 3     enoxaparin ANTICOAGULANT (LOVENOX) 40 MG/0.4ML syringe Inject 0.4 mLs (40 mg) Subcutaneous every 24 hours 12 mL 0     latanoprost (XALATAN) 0.005 % ophthalmic solution Place 1 drop into both eyes daily       metFORMIN (GLUCOPHAGE) 500  "MG tablet Take 1 tablet (500 mg) by mouth 2 times daily (with meals) (Patient taking differently: Take 500 mg by mouth 2 times daily (with meals) Patient tapering up to final dose 1,000 mg BID. Currently taking 1,000 mg in AM and 500 in PM) 120 tablet 11     methotrexate 2.5 MG tablet Take 8 tablets (20 mg) by mouth every 7 days (Patient taking differently: Take 20 mg by mouth every 7 days Monday) 32 tablet 2     oxyCODONE (ROXICODONE) 5 MG tablet Take 1 tablet (5 mg) by mouth every 4 hours as needed 40 tablet 0     polyethylene glycol (MIRALAX) 17 g packet Take 17 g by mouth daily 7 packet 0     predniSONE (DELTASONE) 5 MG tablet Take 1 tablet (5 mg) by mouth 2 times daily       senna-docusate (SENOKOT-S/PERICOLACE) 8.6-50 MG tablet Take 1 tablet by mouth 2 times daily 30 tablet 0       ALLERGIES:  No Known Allergies    ROS:  10 point ROS were negative other than the symptoms noted above in the HPI.    PHYSICAL EXAM:  Vital signs were reviewed in the chart.  Vital Signs: /70   Pulse 98   Temp 97.2  F (36.2  C)   Resp 17   Ht 1.727 m (5' 8\")   Wt 73.9 kg (163 lb)   SpO2 90%   BMI 24.78 kg/m    General: Cooperative, comfortable, and in no acute distress  HEENT: Normocephalic; oropharynx clear; mild conjunctival pallor  Cardiovascular: Normal S1, S2, RRR  Respiratory: Lungs clear to auscultation bilaterally  GI: Abdomen soft, non-tender, non-distended, +BS  Extremities: Mild swelling of the left knee, 1+ left lower extremity edema  Neuro: CX II-XII grossly intact; ROM in all four extremities grossly intact  Psych: Alert and oriented x3; normal affect  Skin: No acute rash    LABORATORY/IMAGING DATA:    Most Recent 3 CBC's:Recent Labs   Lab Test 11/04/21  0603 11/03/21  0638 11/02/21  0549 11/01/21  1107 10/21/21  1549 10/21/21  1549 10/12/21  1100 10/12/21  1100   WBC  --   --  15.0*  --   --  15.9*  --  11.0   HGB  --  10.5* 11.5* 12.1*   < > 12.5*   < > 14.2   MCV  --   --  102*  --   --  101*  --  101* "     --  332  --   --  316   < > 322    < > = values in this interval not displayed.     Most Recent 3 BMP's:Recent Labs   Lab Test 11/04/21  0836 11/04/21  0111 11/03/21 2128 11/02/21  0857 11/02/21  0549 11/02/21  0048 11/01/21  2141 11/01/21  1235 11/01/21  1107 10/08/21  0816 10/05/21  0910 09/28/21  1735 09/28/21  1735   NA  --   --   --   --  136  --   --   --   --   --  135  --  136   POTASSIUM  --   --   --   --  3.9  --   --   --  3.6  --  4.3   < > 4.1   CHLORIDE  --   --   --   --  103  --   --   --   --   --  102  --  100   CO2  --   --   --   --  27  --   --   --   --   --  28  --  28   BUN  --   --   --   --  17  --   --   --   --   --  33*  --  14   CR  --   --   --   --  0.83  --  0.84  --  0.85   < > 1.17   < > 0.89   ANIONGAP  --   --   --   --  6  --   --   --   --   --  5  --  8   BRADY  --   --   --   --  8.9  --   --   --   --   --  10.1  --  9.8   * 219* 181*   < > 224*   < >  --    < >  --    < > 224*   < > 194*    < > = values in this interval not displayed.     Most Recent 2 LFT's:Recent Labs   Lab Test 10/21/21  1549 10/12/21  1100 10/08/21  0816 08/12/21  1125 08/05/21  0929 08/05/21  0929   AST 9 9   < > 21   < > 30   ALT 15 18   < > 26   < > 38   ALKPHOS  --   --   --  54  --  51   BILITOTAL  --   --   --  0.4  --  0.5    < > = values in this interval not displayed.     Most Recent Cholesterol Panel:Recent Labs   Lab Test 08/05/21  0929   CHOL 172   LDL 94   HDL 44   TRIG 168*     Most Recent TSH and T4:Recent Labs   Lab Test 08/05/21  0929   TSH 9.42*   T4 1.03     Most Recent Hemoglobin A1c:Recent Labs   Lab Test 10/21/21  1549   A1C 9.6*     Most Recent 6 glucoses:Recent Labs   Lab Test 11/04/21  0836 11/04/21  0111 11/03/21  2128 11/03/21  1717 11/03/21  1219 11/03/21  0638   * 219* 181* 173* 232* 215*         ASSESSMENT/PLAN:  Left knee osteoarthritis, s/p left total knee arthroplasty on November 1, 2021:  Physical deconditioning.  Patient is hemodynamically  stable.  Surgical pain is fairly controlled.  Plan:  Pain management with as needed acetaminophen and oxycodone  DVT prophylaxis with enoxaparin 40 mg subcu for 4 weeks through November 30  FWB as tolerated  Mobilize with PT/OT  Follow-up with Ortho (Dr. Kruger) on November 17 as scheduled    Acute blood loss anemia.  Due to orthopedic surgery.  Hemoglobin postop dropped to 10.5 from initial 12.1.  Plan:  Recheck CBC on November 8    DM type II.  Last hemoglobin A1c was 9.6% on October 21, 2021.  Hyperglycemia is fairly controlled.  Plan:  Diabetic diet  Continue Metformin 500 mg p.o. twice daily and empagliflozin 20 mg p.o. daily  Monitor blood glucose AC and HS  Follow-up with PCP after discharge from TCU    CKD stage II.  Baseline creatinine around 0.9.  Last creatinine was stable at 0.83 on November 2.  Plan:  Avoid NSAIDs and nephrotoxins  Monitor renal function periodically    Dyslipidemia.  Not on statin therapy.  Plan:  Follow-up with PCP for optimal management    Gout.  Plan:  Continue PTA allopurinol 300 mg p.o. daily    Rheumatoid arthritis (RF+, CCP-).  Patient follows with Dr. Shayne Joe of rheumatology.  Last seen in rheumatology clinic on September 30, 2021; methotrexate dose was increased to 20 mg p.o. weekly and started on prednisone 5 mg p.o. twice daily for 3 weeks for RA flare  Plan:  Discontinue prednisone  Hold PTA methotrexate 20 mg p.o. every 7 days on Mondays but resume when deemed safe with Ortho/rheumatology  Follow-up with Dr. Joe in mid November as instructed    Slow transit constipation.  Plan:  Continue the bowel regimen      Orders written by provider at facility:  CBC and BMP on November 8  Discontinue prednisone  Make an appointment with Dr. Shayne Joe of rheumatology for mid November      Recommendation by provider at facility:  Weekly CBC while patient stays on enoxaparin          Electronically signed by:  Tawanda Maurice MD

## 2021-11-04 NOTE — PLAN OF CARE
"Pt alert and verbal. Oriented x4. Primary language: Argentine. Virtual  available at bedside. /53 (BP Location: Right arm)   Pulse 99   Temp 98.6  F (37  C) (Oral)   Resp 13   Ht 1.727 m (5' 7.99\")   Wt 74 kg (163 lb 2.3 oz)   SpO2 94%   BMI 24.81 kg/m  . Voids spontaneously. Uses urinal at bedside. Last BM 11/1/21. Activity to advance as tolerated. OOB with PT. Assist x1 with walker and gait belt. Pt does not use call light. Family will press light when pt needs something.  Education on importance of CPM provided. CPM on for one hour after evening meal, then pt requested it to be removed. PRN oxycodone requested for pain. Lungs clear bilaterally in upper and lower lobes. Denies SOB. Currently on RA. ACE wrap on L knee - CDI. PIV in L forearm - patent. L knee incision not visible at this time. Bed rails x2 w/ bed alarm activated.    Continue pain management. Continue postop education. Expected discharge to TCU on 11/4.    "

## 2021-11-04 NOTE — PROGRESS NOTES
"  Orthopaedic Surgery Daily Progress Note     Subjective: Stephen Valerio is a 81 year old male POD #3 s/p left TKA  Per discussion with patient and family yesterday then prefer TCU placement and that is being worked on. Making slow progress with PT and OT.     Physical Exam   /78 (BP Location: Left arm)   Pulse 100   Temp 99.2  F (37.3  C) (Oral)   Resp 16   Ht 1.727 m (5' 7.99\")   Wt 74 kg (163 lb 2.3 oz)   SpO2 93%   BMI 24.81 kg/m      Intake/Output Summary (Last 24 hours) at 11/2/2021 0742  Last data filed at 11/2/2021 0553  Gross per 24 hour   Intake 1441 ml   Output 2350 ml   Net -909 ml     General: Alert, well-appearing in no acute distress.  Respiratory: Non-labored breathing.   Cardiovascular: Extremities warm and well perfused   Extremities: moving all four extremities.   LLE:  -Sens: SILT dp/sp/s/s/t  -Motor: 5/5 EHL/FHL/TA/GSc  -Vasc: 2+ pulses, wwp, brisk cap refill    Dressing CDI    Skin: As noted above. No rashes or lesions appreciated.    Drain removed by patient yesterday    Labs    Complete Blood Count   Recent Labs   Lab 11/04/21  0603 11/03/21  0638 11/02/21  0549 11/01/21  1107   WBC  --   --  15.0*  --    HGB  --  10.5* 11.5* 12.1*     --  332  --      Basic Metabolic Panel  Recent Labs   Lab 11/04/21  0836 11/04/21  0111 11/03/21  2128 11/03/21  1717 11/02/21  0857 11/02/21  0549 11/02/21  0048 11/01/21  2141 11/01/21  1235 11/01/21  1107   NA  --   --   --   --   --  136  --   --   --   --    POTASSIUM  --   --   --   --   --  3.9  --   --   --  3.6   CHLORIDE  --   --   --   --   --  103  --   --   --   --    CO2  --   --   --   --   --  27  --   --   --   --    BUN  --   --   --   --   --  17  --   --   --   --    CR  --   --   --   --   --  0.83  --  0.84  --  0.85   * 219* 181* 173*   < > 224*   < >  --    < >  --     < > = values in this interval not displayed.     Coagulation Profile  No lab results found in last 7 " days.    Assessment/Plan:  Assessment and Plan:  Stephen Valerio is a 81 year old male with PMH of  RA, DM2 s/p left TKA on 11/1 with Dr. Kruger      Post-op Plan: Admit for PT, pain control and glucose monitoring post-op  WB status:  FWB  Device:  Hemovac Drain x 1-2 d  DVT Prophylaxis:  Lovenox x 40 mg subcutaneous x 4 wks  Follow-up:  2 weeks with Dr. Kruger/[facility] for wound check  -discharge when has a place    Discussed with Dr. Saskia Duran MD   Orthopaedic Surgery Resident, PGY-4  P: [phone]

## 2021-11-04 NOTE — PROGRESS NOTES
[facility]    Medicine Progress Note - Hospitalist Service       Date of Admission:  11/1/2021    Assessment & Plan           Stephen Valerio is a 81 year old male with a history of RA, severe osteoarthritis, type 2 DM, gout, and GERD admitted on 11/1, s/p left total knee arthroplasty with Dr. Kruger.  Internal medicine is consulted for assistance w/ post op medical management.       Today's changes: 11/4/2021  Doing well.   No new concern/ changes.      # S/p L TKA  # Severe osteoarthritis   By Dr. Kruger.  Preop Hgb 12.1, EBL 100cc.   -Postop management primarily by orthopedic team.  -Pain control, DVT prophylaxis, activity, wound care etc. per primary.  -Follow hemoglobin for anemia of acute blood loss.  Transfuse for less than 7.  Hemoglobin 11/2: 11.5--> 11/03: 10.5.   -Bowel regimen w/ senna and miralax, hold for loose stools   -PT, OT consults   -Encourage incentive spirometry.  -Monitor hemodynamics : Currently stable  -Postop delirium precautions: ao x 4 now.      # RA (upper extremity polyarthritis RF+, CCP -)   # Gout   Follows w/ Dr. Joe (Rheumatology), last seen in clinic 9/30/21.  On Methotrexate 20mg weekly on Mondays (increased on 9/30 from 15mg weekly) and Allopurinol 300mg daily.  Has been on Prednisone 5mg BID at least since 9/30 - pt says he is still taking this, but appears that he was only supposed to take for 3 weeks to cover for adjustment to increased Methotrexate dosing.  Received 2mg dexamethasone intraop.   - Hold Methotrexate while inpatient   - Continue Prednisone 5mg BID for now  - Monitor for hypotension - currently w/o s/s adrenal insufficiency   - Will need outpatient follow up with Rheumatology to determine timing/dose of resuming Methotrexate (hopefully this can be scheduled prior to discharge)      # Type 2 DM - Last Hgb A1c 9.6% on 10/21.  On Metformin 500mg at suppertime and Jardiance 20mg daily PTA.  Does not use  insulin at home, but does check his sugars AM/HS.  BGs w/ mild hyperglycemia today, as below:     Recent Labs   Lab 11/04/21  0836 11/04/21  0111 11/03/21  2128 11/03/21  1717 11/03/21  1219 11/03/21  0638   * 219* 181* 173* 232* 215*     - Hold Jardiance for now, need to ensure adequate PO intake - resume at discharge.   - Holding Metformin for now given lactic acidosis- resume at discharge.   - May see transient hyperglycemia w/ steroids on board   - Ct MSSI   - BGs ac/hs for now   - Hypoglycemia protocol      # Stage III CKD - Preop Cr 0.85, c/w recent BL.  GFR 59-83 last 1 month.    - Minimize nephrotoxic medications       # GERD - Famotidine prn     Rest per primary.      The patient's care was discussed with the Bedside Nurse, Care Coordinator/, Patient and Primary team.    Chapo Tinajero MD  Hospitalist Service  [facility]  Securely message with the Vocera Web Console (learn more here)  Text page via [facility] Paging/Directory      ______________________________________________________________________    Interval History   Interval events reviewed.   States doing okay   Denies fever or chills.   No cough or cp or sob.   No NV or pain abdomen.   No new sensory or motor complaint.   No other new or acute medical concern      Data reviewed today: I reviewed all medications, new labs and imaging results over the last 24 hours. I personally reviewed no images or EKG's today.    Physical Exam   Vital Signs: Temp: 99.2  F (37.3  C) Temp src: Oral BP: 127/78 Pulse: 100   Resp: 16 SpO2: 93 % O2 Device: None (Room air)    Weight: 163 lbs 2.25 oz    General Appearance: Awake, interactive, NAD  Respiratory: Normal work of breathing. CTA BL.   Cardiovascular: S1 S2 Regular  GI: Soft. NT. ND.  Extremities: Distally wwp.   Other: A0 x 4.  Grossly nonfocal.    Data   Recent Labs   Lab 11/04/21  0836 11/04/21  0603 11/04/21  0111 11/03/21  2128 11/03/21  1219  11/03/21  0638 11/02/21  0857 11/02/21  0549 11/02/21  0048 11/01/21  2141 11/01/21  1235 11/01/21  1107   WBC  --   --   --   --   --   --   --  15.0*  --   --   --   --    HGB  --   --   --   --   --  10.5*  --  11.5*  --   --   --  12.1*   MCV  --   --   --   --   --   --   --  102*  --   --   --   --    PLT  --  339  --   --   --   --   --  332  --   --   --   --    NA  --   --   --   --   --   --   --  136  --   --   --   --    POTASSIUM  --   --   --   --   --   --   --  3.9  --   --   --  3.6   CHLORIDE  --   --   --   --   --   --   --  103  --   --   --   --    CO2  --   --   --   --   --   --   --  27  --   --   --   --    BUN  --   --   --   --   --   --   --  17  --   --   --   --    CR  --   --   --   --   --   --   --  0.83  --  0.84  --  0.85   ANIONGAP  --   --   --   --   --   --   --  6  --   --   --   --    BRADY  --   --   --   --   --   --   --  8.9  --   --   --   --    *  --  219* 181*   < > 215*   < > 224*   < >  --    < >  --     < > = values in this interval not displayed.     No results found for this or any previous visit (from the past 24 hour(s)).  Medications     lactated ringers Stopped (11/03/21 1048)       acetaminophen  975 mg Oral Q8H     enoxaparin ANTICOAGULANT  40 mg Subcutaneous Q24H     insulin aspart  1-7 Units Subcutaneous TID AC     insulin aspart  1-5 Units Subcutaneous At Bedtime     polyethylene glycol  17 g Oral Daily     predniSONE  5 mg Oral BID     senna-docusate  1 tablet Oral BID     sodium chloride (PF)  3 mL Intracatheter Q8H

## 2021-11-04 NOTE — PLAN OF CARE
Physical Therapy Discharge Summary    Reason for therapy discharge:    Discharged to transitional care facility.    Progress towards therapy goal(s). See goals on Care Plan in [facility] electronic health record for goal details.  Goals partially met.  Barriers to achieving goals:   discharge from facility.    Therapy recommendation(s):    Continued therapy is recommended.  Rationale/Recommendations:  Safety, independence, full return to PLOF.

## 2021-11-05 NOTE — LETTER
11/5/2021        RE: Stephen Valerio  [address]  [facility] [address]        [place] [facility]  INITIAL VISIT NOTE  November 5, 2021    PRIMARY CARE PROVIDER AND CLINIC:  CraigJuliet wise [address] / [place] [address]    CHIEF COMPLAINT:  Hospital follow-up/Initial visit    HPI:    Stephen Valerio is a 81 year old  (1940) male who [facility] for an initial visit.     Medical history is notable for DM type II, dyslipidemia, CKD stage II, gout, glaucoma, rheumatoid arthritis, chronic back pain, and osteoarthritis.    Summary of hospital course:  Patient was hospitalized at [facility] from November 1 through November 4, 2021 for planned left total knee arthroplasty.  The surgery was achieved on November 1, 2021 and EBL was less than 50 mL.  He did well postoperatively.  Hemoglobin dropped to 10.5 from initial 12.1.  TCU was recommended per therapies.    Patient is admitted to this facility for medical management, nursing care, and rehab.     Of note, history was obtained from patient, facility RN, and extensive review of the chart necessitated by complex hospitalization.    Today's visit:  Patient was seen in his room, while sitting on the edge of the bed.  He is accompanied by his son who served as  as patient is only Ghanaian-speaking.  He feels comfortable.  He rates his left knee surgical pain at 6 out of 10.  He denies fever, chills, chest pain, palpitation, dyspnea, nausea, vomiting, abdominal pain, or urinary symptoms.  He reports a bowel movement yesterday.    CODE STATUS:   CPR/Full code     PAST MEDICAL HISTORY:   DM type II  Dyslipidemia  CKD stage II, baseline creatinine around 0.9  Gout  Rheumatoid arthritis (RF+, CCP-), patient follows with Dr. Shayne Joe of rheumatology  Osteoarthritis  Chronic back pain  Glaucoma      Past Medical History:   Diagnosis Date     Chronic back pain       Gout      Hyperlipidemia LDL goal <130 04/08/2014     Primary osteoarthritis of both knees      RA (rheumatoid arthritis) (H)        PAST SURGICAL HISTORY:   Past Surgical History:   Procedure Laterality Date     ARTHROPLASTY KNEE Left 11/1/2021    Procedure: ARTHROPLASTY, LEFT KNEE, TOTAL;  Surgeon: Hermes Kruger MD;  Location: [facility] OR     IRRIGATION AND DEBRIDEMENT HAND, COMBINED  2/11/2014    Procedure: COMBINED IRRIGATION AND DEBRIDEMENT HAND;  I&D Left Wrist;  Surgeon: Randy Perrin MD;  Location:  OR       FAMILY HISTORY:   Family History   Problem Relation Age of Onset     Diabetes Brother      Anesthesia Reaction No family hx of      Cardiovascular No family hx of      Deep Vein Thrombosis (DVT) No family hx of        SOCIAL HISTORY:  Social History     Tobacco Use     Smoking status: Former Smoker     Smokeless tobacco: Never Used   Substance Use Topics     Alcohol use: No       MEDICATIONS:  Current Outpatient Medications   Medication Sig Dispense Refill     acetaminophen (TYLENOL) 325 MG tablet Take 2 tablets (650 mg) by mouth every 4 hours as needed for other 60 tablet 0     alcohol swab prep pads Use to swab area of injection/olga as directed. 100 each 6     allopurinol (ZYLOPRIM) 300 MG tablet Take 1 tablet (300 mg) by mouth daily (Patient taking differently: Take 300 mg by mouth every morning ) 30 tablet 2     blood glucose (ACCU-CHEK SOFTCLIX) lancing device Device to be used with lancets. 1 each 0     blood glucose (NO BRAND SPECIFIED) test strip Use to test blood sugar 1-2 times daily or as directed. Accu-Chek 100 strip 4     blood glucose monitoring (ACCU-CHEK MULTICLIX) lancets Use to test blood sugar 1-2 times daily. 102 each 11     empagliflozin (JARDIANCE) 10 MG TABS tablet Take 2 tablets (20 mg) by mouth daily 60 tablet 3     enoxaparin ANTICOAGULANT (LOVENOX) 40 MG/0.4ML syringe Inject 0.4 mLs (40 mg) Subcutaneous every 24 hours 12 mL 0     latanoprost (XALATAN) 0.005 %  "ophthalmic solution Place 1 drop into both eyes daily       metFORMIN (GLUCOPHAGE) 500 MG tablet Take 1 tablet (500 mg) by mouth 2 times daily (with meals) (Patient taking differently: Take 500 mg by mouth 2 times daily (with meals) Patient tapering up to final dose 1,000 mg BID. Currently taking 1,000 mg in AM and 500 in PM) 120 tablet 11     methotrexate 2.5 MG tablet Take 8 tablets (20 mg) by mouth every 7 days (Patient taking differently: Take 20 mg by mouth every 7 days Monday) 32 tablet 2     oxyCODONE (ROXICODONE) 5 MG tablet Take 1 tablet (5 mg) by mouth every 4 hours as needed 40 tablet 0     polyethylene glycol (MIRALAX) 17 g packet Take 17 g by mouth daily 7 packet 0     predniSONE (DELTASONE) 5 MG tablet Take 1 tablet (5 mg) by mouth 2 times daily       senna-docusate (SENOKOT-S/PERICOLACE) 8.6-50 MG tablet Take 1 tablet by mouth 2 times daily 30 tablet 0       ALLERGIES:  No Known Allergies    ROS:  10 point ROS were negative other than the symptoms noted above in the HPI.    PHYSICAL EXAM:  Vital signs were reviewed in the chart.  Vital Signs: /70   Pulse 98   Temp 97.2  F (36.2  C)   Resp 17   Ht 1.727 m (5' 8\")   Wt 73.9 kg (163 lb)   SpO2 90%   BMI 24.78 kg/m    General: Cooperative, comfortable, and in no acute distress  HEENT: Normocephalic; oropharynx clear; mild conjunctival pallor  Cardiovascular: Normal S1, S2, RRR  Respiratory: Lungs clear to auscultation bilaterally  GI: Abdomen soft, non-tender, non-distended, +BS  Extremities: Mild swelling of the left knee, 1+ left lower extremity edema  Neuro: CX II-XII grossly intact; ROM in all four extremities grossly intact  Psych: Alert and oriented x3; normal affect  Skin: No acute rash    LABORATORY/IMAGING DATA:    Most Recent 3 CBC's:Recent Labs   Lab Test 11/04/21  0603 11/03/21  0638 11/02/21  0549 11/01/21  1107 10/21/21  1549 10/21/21  1549 10/12/21  1100 10/12/21  1100   WBC  --   --  15.0*  --   --  15.9*  --  11.0   HGB  --  " 10.5* 11.5* 12.1*   < > 12.5*   < > 14.2   MCV  --   --  102*  --   --  101*  --  101*     --  332  --   --  316   < > 322    < > = values in this interval not displayed.     Most Recent 3 BMP's:Recent Labs   Lab Test 11/04/21  0836 11/04/21  0111 11/03/21  2128 11/02/21  0857 11/02/21  0549 11/02/21  0048 11/01/21  2141 11/01/21  1235 11/01/21  1107 10/08/21  0816 10/05/21  0910 09/28/21  1735 09/28/21  1735   NA  --   --   --   --  136  --   --   --   --   --  135  --  136   POTASSIUM  --   --   --   --  3.9  --   --   --  3.6  --  4.3   < > 4.1   CHLORIDE  --   --   --   --  103  --   --   --   --   --  102  --  100   CO2  --   --   --   --  27  --   --   --   --   --  28  --  28   BUN  --   --   --   --  17  --   --   --   --   --  33*  --  14   CR  --   --   --   --  0.83  --  0.84  --  0.85   < > 1.17   < > 0.89   ANIONGAP  --   --   --   --  6  --   --   --   --   --  5  --  8   BRADY  --   --   --   --  8.9  --   --   --   --   --  10.1  --  9.8   * 219* 181*   < > 224*   < >  --    < >  --    < > 224*   < > 194*    < > = values in this interval not displayed.     Most Recent 2 LFT's:Recent Labs   Lab Test 10/21/21  1549 10/12/21  1100 10/08/21  0816 08/12/21  1125 08/05/21  0929 08/05/21  0929   AST 9 9   < > 21   < > 30   ALT 15 18   < > 26   < > 38   ALKPHOS  --   --   --  54  --  51   BILITOTAL  --   --   --  0.4  --  0.5    < > = values in this interval not displayed.     Most Recent Cholesterol Panel:Recent Labs   Lab Test 08/05/21  0929   CHOL 172   LDL 94   HDL 44   TRIG 168*     Most Recent TSH and T4:Recent Labs   Lab Test 08/05/21  0929   TSH 9.42*   T4 1.03     Most Recent Hemoglobin A1c:Recent Labs   Lab Test 10/21/21  1549   A1C 9.6*     Most Recent 6 glucoses:Recent Labs   Lab Test 11/04/21  0836 11/04/21  0111 11/03/21  2128 11/03/21  1717 11/03/21  1219 11/03/21  0638   * 219* 181* 173* 232* 215*         ASSESSMENT/PLAN:  Left knee osteoarthritis, s/p left total knee  arthroplasty on November 1, 2021:  Physical deconditioning.  Patient is hemodynamically stable.  Surgical pain is fairly controlled.  Plan:  Pain management with as needed acetaminophen and oxycodone  DVT prophylaxis with enoxaparin 40 mg subcu for 4 weeks through November 30  FWB as tolerated  Mobilize with PT/OT  Follow-up with Ortho (Dr. Kruger) on November 17 as scheduled    Acute blood loss anemia.  Due to orthopedic surgery.  Hemoglobin postop dropped to 10.5 from initial 12.1.  Plan:  Recheck CBC on November 8    DM type II.  Last hemoglobin A1c was 9.6% on October 21, 2021.  Hyperglycemia is fairly controlled.  Plan:  Diabetic diet  Continue Metformin 500 mg p.o. twice daily and empagliflozin 20 mg p.o. daily  Monitor blood glucose AC and HS  Follow-up with PCP after discharge from TCU    CKD stage II.  Baseline creatinine around 0.9.  Last creatinine was stable at 0.83 on November 2.  Plan:  Avoid NSAIDs and nephrotoxins  Monitor renal function periodically    Dyslipidemia.  Not on statin therapy.  Plan:  Follow-up with PCP for optimal management    Gout.  Plan:  Continue PTA allopurinol 300 mg p.o. daily    Rheumatoid arthritis (RF+, CCP-).  Patient follows with Dr. Shayne Joe of rheumatology.  Last seen in rheumatology clinic on September 30, 2021; methotrexate dose was increased to 20 mg p.o. weekly and started on prednisone 5 mg p.o. twice daily for 3 weeks for RA flare  Plan:  Discontinue prednisone  Hold PTA methotrexate 20 mg p.o. every 7 days on Mondays but resume when deemed safe with Ortho/rheumatology  Follow-up with Dr. Joe in mid November as instructed    Slow transit constipation.  Plan:  Continue the bowel regimen      Orders written by provider at facility:  CBC and BMP on November 8  Discontinue prednisone  Make an appointment with Dr. Shayne Joe of rheumatology for mid November      Recommendation by provider at facility:  Weekly CBC while patient stays on  enoxaparin          Electronically signed by:  Tawanda Maurice MD                          Sincerely,        Tawanda Maurice MD

## 2021-11-05 NOTE — LETTER
[facility]   To:   [facility]          Please give to facility    From:   DAMON Porter Care Coordinator [facility]     Patient Name:  Stephen Valerio  YOB: 1940    Admit date: 11/3/2021      *Information Needed:  Please contact me when the patient will discharge (or if they will move to long term care)- include the discharge date, disposition, and main diagnosis   - If the patient is discharged with home care services, please provide the name of the agency  .   Phone, Fax or Email with information       Thank You,   MARIANNE Porter, MSW  Clinic Care Coordinator  [facility] [place], [place], and [place]  Ph: [phone]  [email or web address]

## 2021-11-05 NOTE — PROGRESS NOTES
Clinic Care Coordination Contact  Care Coordination Transition Communication    Referral Source: IP Report    Clinical Data: [facility] Discharge Summary     Stephen Valerio MRN# [id number]   Age: 81 year old YOB: 1940         Date of Admission:              11/1/2021  Date of Discharge::             11/3/2021   Admitting Physician:                      Hermes Kruger MD  Discharge To:                     Home   Primary Care Physician:      Juliet Srinivasan           Admission Diagnoses:   Bilateral primary osteoarthritis of knee [M17.0]  Osteoarthritis of left knee, unspecified osteoarthritis type [M17.12]          Discharge Diagnosis:   Bilateral primary osteoarthritis of knee [M17.0]  Osteoarthritis of left knee, unspecified osteoarthritis type [M17.12]             Procedures Performed:   11/1/2021: Left TKA    Transition to Facility:              Facility Name: [facility]              Contact name and phone number/fax: [phone]/ [phone]    Plan: RN/SW Care Coordinator will await notification from facility staff informing RN/SW Care Coordinator of patient's discharge plans/needs. RN/SW Care Coordinator will review chart and outreach to facility staff every 4 weeks and as needed. Fax sent to TCU with my contact information requesting notification upon discharge.    DOLLY Porter  Clinic Care Coordinator  [facility] [address]  [facility]  [phone]  [email or web address]

## 2021-11-07 PROBLEM — J18.9 COMMUNITY ACQUIRED PNEUMONIA, UNSPECIFIED LATERALITY: Status: ACTIVE | Noted: 2021-01-01

## 2021-11-07 PROBLEM — J96.01 ACUTE RESPIRATORY FAILURE WITH HYPOXIA (H): Status: ACTIVE | Noted: 2021-01-01

## 2021-11-07 NOTE — ED NOTES
[facility]  ED Nurse Handoff Report    ED Chief complaint: Fever      ED Diagnosis:   Final diagnoses:   None       Code Status: Full Code    Allergies: Not on File    Patient Story: pt had surgery last Monday to lt knee, discharge to ,today sob, hypoxic 80% on RA, chills, confused. Temp 102.8. pt normally alert and oriented. Daughters here.   Focused Assessment:  See above    Treatments and/or interventions provided: meds  Patient's response to treatments and/or interventions: see chart    To be done/followed up on inpatient unit:  none    Does this patient have any cognitive concerns?: Disoriented to situation    Activity level - Baseline/Home:  Walker  Activity Level - Current:   Total Care    Patient's Preferred language: Data Unavailable   Needed?: Yes    Isolation: None  Infection: Not Applicable  Patient tested for COVID 19 prior to admission: YES  Bariatric?: No    Vital Signs:   Vitals:    11/07/21 1448   BP: 130/84   Pulse: (!) 129   Temp: (!) 102.8  F (39.3  C)   TempSrc: Oral   SpO2: 97%       Cardiac Rhythm:     Was the PSS-3 completed:   Yes  What interventions are required if any?               Family Comments: daughters  OBS brochure/video discussed/provided to patient/family: N/A              Name of person given brochure if not patient: na              Relationship to patient: na    For the majority of the shift this patient's behavior was Green.   Behavioral interventions performed were none.    ED NURSE PHONE NUMBER: [phone]

## 2021-11-07 NOTE — ED PROVIDER NOTES
History   Chief Complaint:  Fever    The history is provided by a relative and the patient. The history is limited by a language barrier and the condition of the patient. A  was used (Dr. Briggs and the patient's relatives served as interpreters).      Stephen Solorio is a 81 year old male with history of type II diabetes, rheumatoid arthritis, gout, primary osteoarthritis of both knees, and hyponatremia who presents with fever. The patient had knee surgery on Monday at the [facility] and was discharged to [place]. Since then he has been in rehabilitation. Yesterday he developed chills and he was unable to sleep last night. Today, the nurse at [facility] found him to be hypoxic at 80% and lethargic, so he was sent to the ED. His daughter also reports that he has been more confused than usual. He has also been dealing with a flare up of his gout. Yesterday the patient was able to eat, but today, he has not been eating and only drinking a little bit of water. He is also experiencing a little bit of a cough and some shortness of breath. He is not vomiting and has not had any episodes of diarrhea. Normally, he is able to walk and talk normally.     Review of Systems   Constitutional: Positive for activity change, appetite change, chills, fatigue and fever.   Respiratory: Positive for cough and shortness of breath.    Gastrointestinal: Negative for diarrhea and vomiting.   Musculoskeletal: Positive for joint swelling.   Psychiatric/Behavioral: Positive for confusion and sleep disturbance.   All other systems reviewed and are negative.    Allergies:  No known allergies    Medications:  Zyloprim  Accu-check softclix  Blood glucose test strip  Accu-check multiclix  Jardiance  Lovenox  Xalatan  Glucophage  Methotrexate  Roxicodone  Miralax  Deltasone  Senokot-S/Pericolace    Past Medical History:     RA  Chronic back pain  Gout  Primary osteoarthritis of both knees   Esophageal  reflux  Hyponatremia  Acute gouty arthritis  Elevated fasting blood sugar  Type 2 diabetes mellitus with other specified complication, without long-term current use of insulin  Osteoarthritis of left knee, unspecified osteoarthritis type  Anemia  Physical deconditioning  Elevated serum creatinine     Past Surgical History:    Left knee arthroplasty (11/01/2021)  Irrigation and debridement hand, combined     Family History:    Brother: diabetes    Social History:  Presents with his daughter and granddaughter  History of smoking  Speaks Portuguese    Physical Exam     Patient Vitals for the past 24 hrs:   BP Temp Temp src Pulse Resp SpO2   11/07/21 1951 111/50 100.3  F (37.9  C) Oral 104 28 97 %   11/07/21 1859 -- 100.3  F (37.9  C) -- -- -- --   11/07/21 1830 111/68 -- -- 112 -- 98 %   11/07/21 1815 -- -- -- -- -- 99 %   11/07/21 1800 129/50 -- -- 110 -- 99 %   11/07/21 1745 -- -- -- -- -- 100 %   11/07/21 1730 133/62 -- -- 114 -- 97 %   11/07/21 1715 -- -- -- -- -- 100 %   11/07/21 1700 (!) 142/63 -- -- 107 -- 100 %   11/07/21 1645 -- -- -- -- -- 96 %   11/07/21 1630 135/58 -- -- 114 -- 91 %   11/07/21 1615 -- -- -- -- -- 95 %   11/07/21 1600 -- -- -- -- -- 99 %   11/07/21 1545 -- -- -- -- -- 99 %   11/07/21 1530 -- -- -- -- -- 99 %   11/07/21 1448 130/84 (!) 102.8  F (39.3  C) Oral (!) 129 -- 97 %       Physical Exam  General: sleepy, lying comfortably on gurney  HENT: mucous membranes dry  CV: tachycardic, no murmur, rub or gallop  Resp: normal effort, clear throughout, no crackles or wheezing  GI: abdomen soft and nontender, no guarding  MSK: no bony tenderness. Swollen joints and swollen knuckles bilaterally (chronic per family)   Skin: appropriately warm and dry  Extremities: no edema, calves non-tender. Normal post-operative changes to the left knee.  Dressing is intact.  No surrounding redness of warmth around the incision or joint.  Neuro: lying with eyes closed, intermittently following commands.  Moving all  extremities equally.  No facial droop.  Psych: unable to assess    Emergency Department Course   ECG  ECG obtained at 1541, ECG read at 1608  Sinus tachycardia. Rightward axis. Possible inferior infarct, age undetermined. Abnormal ECG.    Rate 121 bpm. NM interval 184 ms. QRS duration 92 ms. QT/QTc 298/423 ms. P-R-T axes 48 94 38.     Imaging:  US Lower Extremity Venous Duplex Left   Final Result   IMPRESSION:   1.  No deep venous thrombosis in the left lower extremity.      XR Chest Port 1 View   Preliminary Result   IMPRESSION: Patchy bibasilar opacities may represent pneumonia. Hypoinflated lungs. Mild elevation of the right hemidiaphragm. Normal cardiac silhouette.        Report per radiology    Laboratory:  Labs Ordered and Resulted from Time of ED Arrival to Time of ED Departure   COMPREHENSIVE METABOLIC PANEL - Abnormal       Result Value    Sodium 129 (*)     Potassium 3.8      Chloride 95      Carbon Dioxide (CO2) 25      Anion Gap 9      Urea Nitrogen 18      Creatinine 0.90      Calcium 9.4      Glucose 146 (*)     Alkaline Phosphatase 137      AST 15      ALT 20      Protein Total 7.1      Albumin 2.4 (*)     Bilirubin Total 0.8      GFR Estimate 81     CBC WITH PLATELETS AND DIFFERENTIAL - Abnormal    WBC Count 13.4 (*)     RBC Count 3.18 (*)     Hemoglobin 10.0 (*)     Hematocrit 32.1 (*)      (*)     MCH 31.4      MCHC 31.2 (*)     RDW 13.6      Platelet Count 422      % Neutrophils 82      % Lymphocytes 7      % Monocytes 10      % Eosinophils 0      % Basophils 0      % Immature Granulocytes 1      NRBCs per 100 WBC 0      Absolute Neutrophils 11.0 (*)     Absolute Lymphocytes 0.9      Absolute Monocytes 1.4 (*)     Absolute Eosinophils 0.0      Absolute Basophils 0.0      Absolute Immature Granulocytes 0.2 (*)     Absolute NRBCs 0.0     BLOOD GAS VENOUS - Abnormal    pH Venous 7.36      pCO2 Venous 33 (*)     pO2 Venous 74 (*)     Bicarbonate Venous 19 (*)     Base Excess/Deficit (+/-) -5.9       FIO2 5     ROUTINE UA WITH MICROSCOPIC - Abnormal    Color Urine Light Yellow      Appearance Urine Clear      Glucose Urine >=1000 (*)     Bilirubin Urine Negative      Ketones Urine 60  (*)     Specific Gravity Urine 1.019      Blood Urine Negative      pH Urine 5.0      Protein Albumin Urine Negative      Urobilinogen Urine Normal      Nitrite Urine Negative      Leukocyte Esterase Urine Negative      RBC Urine 0      WBC Urine 0      Squamous Epithelials Urine <1     LACTIC ACID WHOLE BLOOD - Normal    Lactic Acid 1.3     INFLUENZA A/B & SARS-COV2 PCR MULTIPLEX - Normal    Influenza A target Negative      Influenza B target Negative      SARS CoV2 PCR Negative     BLOOD CULTURE   BLOOD CULTURE   MRSA MSSA PCR, NASAL SWAB     Emergency Department Course:  Reviewed:  I reviewed nursing notes, vitals, past medical history and Care Everywhere    Assessments:  1508 I obtained history and examined the patient as noted above.   1748 I rechecked the patient and explained findings.   He has improved, and is more awake and communicative. Family agrees he appears better.    Consults:  1751 I spoke on the phone with Dr. Gardner about the patient's condition and need for admission.     Interventions:  1503 Normal saline 1,000 mL IV  1617 Normal saline 1,ooo mL IV  1649 Tylenol suppository 650 mg rectal  1649 Zosyn 4.5 g IV    Disposition:  The patient was admitted to the hospital under the care of Dr. Gardner.     Impression & Plan     Medical Decision Making:  This is a 81 year old male with history of rheumatoid arthritis, diabetes, recent total knee arthroscopy and replacement, who presents today with fever and hypoxia. On exam, patient appears somewhat lethargic, and was febrile. Lungs are fairly clear. He is hypoxic at the TCU to 80%, but has not required aggressive respiratory support here in the ED. His knee wound appears to be healing as I would expect, without any surrounding redness, or purulent discharge.  Otherwise, UA is negative for infection, and COVID-19 testing is negative as well.  Labs otherwise notable for an elevated white blood cell count, mild anemia, and mild hyponatremia. Lactate is normal. The patient was started on broad spectrum antibiotics, and given a liter of fluids. I suspect the encephalopathy is related to infection from the pulmonary source. Chest x-ray is portable, therefore somewhat limited, but he does have evidence for bibasilar opacities which could be consistent with pneumonia. Will plan to admit to the hospital for continued respiratory support, IV antibiotics, and continued work-up of fever and lethargy. At this point, I do not think the patient needs a lumbar puncture as I feel that we have an alternative source of fever and confusion.     Diagnosis:    ICD-10-CM    1. Acute respiratory failure with hypoxia (H)  J96.01    2. Community acquired pneumonia, unspecified laterality  J18.9      Scribe Disclosure:  Kristi CAMPOVERDE, am serving as a scribe at 3:12 PM on 11/7/2021 to document services personally performed by Eleonora Briggs MD based on my observations and the provider's statements to me.          Eleonora Briggs MD  11/08/21 8512

## 2021-11-07 NOTE — CONFIDENTIAL NOTE
[facility] TRIAGE ENCOUNTER    Chief Complaint   Patient presents with     hypoxia       Stephen Valerio is a 81 year old  (1940), Nurse called today to report: Patient is lethargic, not waking up much. Hypoxic today 80% on room air. With 2L his sats came up to 88%. Per nursing he appears hot but is afebrile. He is not getting out of bed or eating. Per nursing he is worsening as the day goes on. She does not think he looks well. Patient is full code.    ASSESSMENT/PLAN    Patient should be evaluated in the ED.    Electronically signed by:   Chelsi Delaney, KASH

## 2021-11-07 NOTE — H&P
[facility]    History and Physical - Hospitalist Service       Date of Admission:  11/7/2021    Assessment & Plan      Stephen Solorio is a 81 year old male admitted on 11/7/2021. He presents with sepsis    Sepsis  Unclear source (PNA vs artificial joint)  Presents with lethargy, hypoxia, recent TKA.  Reports cough, but ROS otherwise negative.  CXR with possible bibasilar infiltrates  Joint does not appear obviously atypical from expected appearance post op, but will opt to treat for presumed joint infection empirically.  - admit inpatient  - vanc and zosyn for HCAP  - MRSA nasal swab. Would continue vancomycin if swab is negative until ortho evaluation. If swab is negative and ortho feels joint is not infected, stop vanco.  - consult to ortho for assistance with eval of post op joint  - supplemental O2    Severe OA s/p L TKA 11/1/21  - consult to ortho as above    RA  Gout  Asymmetric BLE edema  - recent TKA, concern for DVT vs possible gout flair vs infection  - Methotrexate has been on hold post op, continue to hold  - continue PTA prednisolone  - LE DVT USG  - No clear pain, erythema on exam.  - abx as above  - continue allopurinol    DMT2  hgbA1c 9.6 10/21  - hold PTA metformin, empagliflozin  - MDSSI  - QID BG     Hyponatremia  - mild  - possible SIADH from pulmonary process  - monitor       Diet:   carb controlled  DVT Prophylaxis: Enoxaparin (Lovenox) SQ  Naik Catheter: Not present  Central Lines: None  Code Status:   FULL CODE    Clinically Significant Risk Factors Present on Admission         # Hyponatremia: Na = 129 mmol/L (Ref range: 133 - 144 mmol/L) on admission, will monitor as appropriate     # Coagulation Defect: home medication list includes an anticoagulant medication       Disposition Plan   Expected discharge: expect 2 days or more recommended to transitional care unit once SIRS/Sepsis treated.     The patient's care was discussed with the Bedside Nurse, Patient,  Patient's Family and ED MD Team.    Joey Gardner MD  [facility]  Securely message with the [employer] Web Console (learn more here)  Text page via 2[employer] Paging/Directory        ______________________________________________________________________    Chief Complaint   hypoxia    History is obtained from the patient, electronic health record, emergency department physician and patient's family    History of Present Illness   Stephen Solorio is a 81 year old male who has history of gout, rheumatoid arthritis, recent left TKA on 11/1/2021 who has been rehabbing at a TCU since 11/4/2021.  Patient was noted to be febrile and hypoxic on 11/7/2021 was therefore sent to the emergency department for further management.  On 11/6/2021 he reports that he felt chills and he had trouble sleeping.  He does endorse a somewhat mild cough as well.  He also reported that he had pain of his left foot.  That pain has resolved flow.  The TCU was found to be hypoxic to 80% and quite lethargic on 11/7/2021 so sent to the emergency department.    Review of Systems    The 10 point Review of Systems is negative other than noted in the HPI or here.     Past Medical History    I have reviewed this patient's medical history and updated it with pertinent information if needed.   No past medical history on file.  Gout  RA    Past Surgical History   I have reviewed this patient's surgical history and updated it with pertinent information if needed.  No past surgical history on file.  L TKA    Social History   I have reviewed this patient's social history and updated it with pertinent information if needed.  Social History     Tobacco Use     Smoking status: Not on file     Smokeless tobacco: Not on file   Substance Use Topics     Alcohol use: Not on file     Drug use: Not on file       Family History     Brother has DMT2    Prior to Admission Medications   Prior to Admission Medications   Prescriptions Last Dose  Informant Patient Reported? Taking?   acetaminophen (TYLENOL) 325 MG tablet 11/7/2021 at 1 pm  Yes Yes   Sig: Take 650 mg by mouth every 4 hours as needed for mild pain or fever   allopurinol (ZYLOPRIM) 300 MG tablet 11/7/2021 at am  Yes Yes   Sig: Take 300 mg by mouth daily   empagliflozin (JARDIANCE) 10 MG TABS tablet 11/7/2021 at am  Yes Yes   Sig: Take 10 mg by mouth daily (hold if bg<100)   enoxaparin ANTICOAGULANT (LOVENOX) 40 MG/0.4ML syringe 11/7/2021 at 1 pm  Yes Yes   Sig: Inject 40 mg Subcutaneous every 24 hours   latanoprost (XALATAN) 0.005 % ophthalmic solution 11/7/2021 at am  Yes Yes   Sig: Place 1 drop into both eyes daily   metFORMIN (GLUCOPHAGE) 500 MG tablet  at 8 am  Yes Yes   Sig: Take 500 mg by mouth 2 times daily (with meals)   methotrexate 2.5 MG tablet   Yes Yes   Sig: Take 20 mg by mouth every 7 days   oxyCODONE (ROXICODONE) 5 MG tablet  at 5 am  Yes Yes   Sig: Take 5 mg by mouth every 4 hours as needed (osteoarthritis of left knee)   polyethylene glycol (MIRALAX) 17 g packet 11/7/2021 at am  Yes Yes   Sig: Take 1 packet by mouth daily for contipation   prednisoLONE 5 MG tablet 11/5/2021 at am  Yes Yes   Sig: Take 5 mg by mouth 2 times daily   senna-docusate (SENOKOT-S/PERICOLACE) 8.6-50 MG tablet 11/7/2021 at am  Yes Yes   Sig: Take 1 tablet by mouth 2 times daily as needed for constipation      Facility-Administered Medications: None     Allergies   Not on File    Physical Exam   Vital Signs: Temp: 100.3  F (37.9  C) Temp src: Oral BP: 111/68 Pulse: 112     SpO2: 98 %      Weight: 0 lbs 0 oz    Constitutional: Awake, alert, cooperative, no apparent cardiopulmonary distress.  Eyes: Conjunctiva and pupils examined and normal.  HEENT: Moist mucous membranes, normal dentition.  Respiratory: bibasilar crackles. No increased WOB, on O2.  Cardiovascular: Regular rate and rhythm, normal S1 and S2, and no murmur noted.  GI: Soft, non-distended, non-tender, normal bowel sounds.  Lymph/Hematologic:  No anterior cervical or supraclavicular adenopathy.  Skin: No rashes, no cyanosis, no edema noted on exposed skin.  Musculoskeletal: L knee post op with dressing. Swelling noted of knee w/o significant warmth or erythema. L leg and foot edematous without sig erythema pain.  Neurologic: Cranial nerves 2-12 grossly intact, normal strength and sensation.  Psychiatric: Alert, oriented to person, place and time, no obvious anxiety or depression.      Data   Data reviewed today: I reviewed all medications, new labs and imaging results over the last 24 hours. I personally reviewed the EKG tracing showing mild sinus tach and the chest x-ray image(s) showing bibasilar infiltrates.    Recent Labs   Lab 11/07/21  1501   WBC 13.4*   HGB 10.0*   *      *   POTASSIUM 3.8   CHLORIDE 95   CO2 25   BUN 18   CR 0.90   ANIONGAP 9   BRADY 9.4   *   ALBUMIN 2.4*   PROTTOTAL 7.1   BILITOTAL 0.8   ALKPHOS 137   ALT 20   AST 15     Recent Results (from the past 24 hour(s))   XR Chest Port 1 View    Narrative    EXAM: XR CHEST PORT 1 VIEW  LOCATION: [facility]  DATE/TIME: 11/7/2021 4:20 PM    INDICATION: Fever.  COMPARISON: None.      Impression    IMPRESSION: Patchy bibasilar opacities may represent pneumonia. Hypoinflated lungs. Mild elevation of the right hemidiaphragm. Normal cardiac silhouette.   US Lower Extremity Venous Duplex Left    Narrative    EXAM: US LOWER EXTREMITY VENOUS DUPLEX LEFT  LOCATION: [facility]  DATE/TIME: 11/7/2021 7:07 PM    INDICATION: Asymmetric leg swelling. Post L TKA  COMPARISON: None.  TECHNIQUE: Venous Duplex ultrasound of the left lower extremity with and without compression, augmentation and duplex. Color flow and spectral Doppler with waveform analysis performed.    FINDINGS: Exam includes the common femoral, femoral, popliteal, and contralateral common femoral veins as well as segmentally visualized deep calf veins and greater  saphenous vein.     LEFT: No deep vein thrombosis. No superficial thrombophlebitis. No popliteal cyst.      Impression    IMPRESSION:  1.  No deep venous thrombosis in the left lower extremity.

## 2021-11-07 NOTE — ED NOTES
Bed: ED01  Expected date: 11/7/21  Expected time: 2:40 PM  Means of arrival: Ambulance  Comments:  Chuck Valdezm sepsis

## 2021-11-08 NOTE — PLAN OF CARE
Cognitive Concerns/ Orientation : A&O to self and place. Arouses to voice. Albanian speaking.  IPad in room.    BEHAVIOR & AGGRESSION TOOL COLOR: Green  CIWA SCORE: NA    ABNL VS/O2: VSS with HR in the 's. On 3L O2 via NC, LS diminished.  MOBILITY: Bedrest, swollen joints and hands. Pain noted with repositioning.   PAIN MANAGMENT: Tylenol Q6hr   DIET: Mod-carb. Family reports poor appetite over the last few days. Taking in fluid orally and swallows pills whole. Family assisted with food preferences.   BOWEL/BLADDER: External catheter in place. No BM this shift.   ABNL LAB/BG: , Na 129, Alb 2.4, WBC 13.4, Hgb 10.0  DRAIN/DEVICES: PIV in RFA SL with vanco and zosyn ordered.   TELEMETRY RHYTHM: NA   SKIN: Surgical incision in L knee. Pink coccyx, intact.   TESTS/PROCEDURES: XR and US completed in ED. Possible LLL pneumonia.   D/C DAY/GOALS/PLACE: Pending improvements. Ortho consulted.  OTHER IMPORTANT INFO: Bed alarm on for safety.

## 2021-11-08 NOTE — PHARMACY-ADMISSION MEDICATION HISTORY
Pharmacy Medication History  Admission medication history interview status for the 11/7/2021  admission is complete. See EPIC admission navigator for prior to admission medications     Location of Interview: Patient room  Medication history sources: Ella, MAR ([facility] ) and Care Everywhere    Significant changes made to the medication list:  Added all the meds and last doses per MAR sent over from [facility].     In the past week, patient estimated taking medication this percent of the time: greater than 90%    Additional medication history information:   MAR info is just for the month of November, no methotrexate last dose was charted. MAR shows it's on hold for the hole month until okay by rheumatology.     Medication reconciliation completed by provider prior to medication history? No    Time spent in this activity: 45 min    Prior to Admission medications    Medication Sig Last Dose Taking? Auth Provider   acetaminophen (TYLENOL) 325 MG tablet Take 650 mg by mouth every 4 hours as needed for mild pain or fever 11/7/2021 at 1 pm Yes Unknown, Entered By History   allopurinol (ZYLOPRIM) 300 MG tablet Take 300 mg by mouth daily 11/7/2021 at am Yes Unknown, Entered By History   empagliflozin (JARDIANCE) 10 MG TABS tablet Take 10 mg by mouth daily (hold if bg<100) 11/7/2021 at am Yes Unknown, Entered By History   enoxaparin ANTICOAGULANT (LOVENOX) 40 MG/0.4ML syringe Inject 40 mg Subcutaneous every 24 hours 11/7/2021 at 1 pm Yes Unknown, Entered By History   latanoprost (XALATAN) 0.005 % ophthalmic solution Place 1 drop into both eyes daily 11/7/2021 at am Yes Unknown, Entered By History   metFORMIN (GLUCOPHAGE) 500 MG tablet Take 500 mg by mouth 2 times daily (with meals)  at 8 am Yes Unknown, Entered By History   methotrexate 2.5 MG tablet Take 20 mg by mouth every 7 days  Yes Unknown, Entered By History   oxyCODONE (ROXICODONE) 5 MG tablet Take 5 mg by mouth every 4 hours as  needed (osteoarthritis of left knee)  at 5 am Yes Unknown, Entered By History   polyethylene glycol (MIRALAX) 17 g packet Take 1 packet by mouth daily for contipation 11/7/2021 at am Yes Unknown, Entered By History   prednisoLONE 5 MG tablet Take 5 mg by mouth 2 times daily 11/5/2021 at am Yes Unknown, Entered By History   senna-docusate (SENOKOT-S/PERICOLACE) 8.6-50 MG tablet Take 1 tablet by mouth 2 times daily as needed for constipation 11/7/2021 at am Yes Unknown, Entered By History       The information provided in this note is only as accurate as the sources available at the time of update(s)

## 2021-11-08 NOTE — CONSULTS
[facility]    Orthopedics Consultation    Date of Admission:  11/7/2021    Assessment & Plan   Stephen Solorio is a 81 year old male who was admitted on 11/7/2021. I was asked to see the patient to rule out a periprosthetic left knee infection.  Based on his physical examination this appears to be a normal postoperative total knee without signs of infection.  He is approximately 1 week out from surgery and it is very unlikely that he has developed a septic infection within the knee.  With his cough, lethargy, hypoxia, and chest x-ray with infiltrates it is more likely related to pneumonia rather than his total knee arthroplasty.  He should be treated for pneumonia and should follow-up with his total joint surgeon.  Please contact our service if you have any questions or concerns.       Eric Sinclair MD    Code Status    Full Code    Reason for Consult   Reason for consult:     Primary Care Physician   Physician No Ref-Primary    History of Present Illness   Stephen Solorio is a 81 year old male who presents with sepsis with an unclear source.  He did have a previous total knee arthroplasty on the left by Dr. Kruger at the [facility] last week.  He was in rehabilitation at a TCU since being discharged.  He was found to be febrile and hypoxic and he was brought into the emergency department for evaluation.  He does complain of some pain in the left knee.  He also complains of difficulty breathing.  He was admitted to the hospitalist service and chest x-rays demonstrated bibasilar opacities which may represent pneumonia.  An ultrasound was also performed of the left lower extremity which did not reveal a DVT.  Movement of the left knee causes pain but leaving it still improves his pain.  He denies any new numbness or tingling.  He does have a history of diabetes with a hemoglobin A1c of 9.6.    MEDS:   No current outpatient medications on file.       PAST MEDICAL  HISTORY: No past medical history on file.    PAST SURGICAL HISTORY: No past surgical history on file.    FAMILY HISTORY: No family history on file.    SOCIAL HISTORY:   Social History     Tobacco Use     Smoking status: Not on file     Smokeless tobacco: Not on file   Substance Use Topics     Alcohol use: Not on file       ALLERGIES:  No Known Allergies    ROS:  10 point ROS neg other than the symptoms noted above in the HPI.    Physical Exam   Temp: 98.5  F (36.9  C) Temp src: Axillary BP: 120/54 Pulse: 86   Resp: 20 SpO2: 98 % O2 Device: Nasal cannula Oxygen Delivery: 2 LPM  Vital Signs with Ranges  Temp:  [98.5  F (36.9  C)-102.8  F (39.3  C)] 98.5  F (36.9  C)  Pulse:  [] 86  Resp:  [20-28] 20  BP: (105-142)/(45-84) 120/54  SpO2:  [91 %-100 %] 98 %  155 lbs 13.84 oz    Constitutional: Pleasant, alert, appropriate, following commands.  HEENT: Head atraumatic normocephalic. Pupils equal round and reactive to light.  Respiratory: Unlabored breathing no audible wheeze  Cardiovascular: Regular rate and rhythm  GI: Abdomen soft nontender nondistended.  Lymph/Hematologic: No lymphadenopathy in areas examined  Genitourinary:  No harrison  Skin: No rashes, no cyanosis, no edema.  Musculoskeletal: Focused examination of the left lower extremity demonstrates a benign anterior knee incision with minimal surrounding erythema.  I am able to take the knee through range of motion exercises without substantial pain.  There is a mild effusion within the knee.  He has normal sensation distally in the left lower extremity.  He is able to flex and extend at the toes and the ankle.  He has a 2+ DP and PT pulses.  He has good capillary refill of less than 2 seconds.  Neurologic: normal without focal findings, mental status, speech normal, alert and oriented x iii, JOSE, reflexes normal and symmetric      Data   Results for orders placed or performed during the hospital encounter of 11/07/21 (from the past 24 hour(s))   CBC with  platelets + differential    Narrative    The following orders were created for panel order CBC with platelets + differential.  Procedure                               Abnormality         Status                     ---------                               -----------         ------                     CBC with platelets and d...[[id number]]  Abnormal            Final result                 Please view results for these tests on the individual orders.   Comprehensive metabolic panel   Result Value Ref Range    Sodium 129 (L) 133 - 144 mmol/L    Potassium 3.8 3.4 - 5.3 mmol/L    Chloride 95 94 - 109 mmol/L    Carbon Dioxide (CO2) 25 20 - 32 mmol/L    Anion Gap 9 3 - 14 mmol/L    Urea Nitrogen 18 7 - 30 mg/dL    Creatinine 0.90 0.66 - 1.25 mg/dL    Calcium 9.4 8.5 - 10.1 mg/dL    Glucose 146 (H) 70 - 99 mg/dL    Alkaline Phosphatase 137 40 - 150 U/L    AST 15 0 - 45 U/L    ALT 20 0 - 70 U/L    Protein Total 7.1 6.8 - 8.8 g/dL    Albumin 2.4 (L) 3.4 - 5.0 g/dL    Bilirubin Total 0.8 0.2 - 1.3 mg/dL    GFR Estimate 81 >60 mL/min/1.73m2   Lactic acid whole blood   Result Value Ref Range    Lactic Acid 1.3 0.7 - 2.0 mmol/L   CBC with platelets and differential   Result Value Ref Range    WBC Count 13.4 (H) 4.0 - 11.0 10e3/uL    RBC Count 3.18 (L) 4.40 - 5.90 10e6/uL    Hemoglobin 10.0 (L) 13.3 - 17.7 g/dL    Hematocrit 32.1 (L) 40.0 - 53.0 %     (H) 78 - 100 fL    MCH 31.4 26.5 - 33.0 pg    MCHC 31.2 (L) 31.5 - 36.5 g/dL    RDW 13.6 10.0 - 15.0 %    Platelet Count 422 150 - 450 10e3/uL    % Neutrophils 82 %    % Lymphocytes 7 %    % Monocytes 10 %    % Eosinophils 0 %    % Basophils 0 %    % Immature Granulocytes 1 %    NRBCs per 100 WBC 0 <1 /100    Absolute Neutrophils 11.0 (H) 1.6 - 8.3 10e3/uL    Absolute Lymphocytes 0.9 0.8 - 5.3 10e3/uL    Absolute Monocytes 1.4 (H) 0.0 - 1.3 10e3/uL    Absolute Eosinophils 0.0 0.0 - 0.7 10e3/uL    Absolute Basophils 0.0 0.0 - 0.2 10e3/uL    Absolute Immature Granulocytes 0.2 (H)  <=0.0 10e3/uL    Absolute NRBCs 0.0 10e3/uL   [place] Draw    Narrative    The following orders were created for panel order [place] Draw.  Procedure                               Abnormality         Status                     ---------                               -----------         ------                     Extra Blue Top Tube[[id number]]                              Final result               Extra Red Top Tube[[id number]]                               Final result               Extra Purple Top Tube[[id number]]                                                         Please view results for these tests on the individual orders.   Extra Blue Top Tube   Result Value Ref Range    Hold Specimen JIC    Extra Red Top Tube   Result Value Ref Range    Hold Specimen JIC    EKG 12 lead   Result Value Ref Range    Systolic Blood Pressure  mmHg    Diastolic Blood Pressure  mmHg    Ventricular Rate 121 BPM    Atrial Rate 121 BPM    IL Interval 184 ms    QRS Duration 92 ms     ms    QTc 423 ms    P Axis 48 degrees    R AXIS 94 degrees    T Axis 38 degrees    Interpretation ECG       Sinus tachycardia  Rightward axis  Possible Inferior infarct , age undetermined  Abnormal ECG  No previous ECGs available  Confirmed by GENERATED REPORT, COMPUTER ([id number]),  MARVIN NAGY ([id number]) on 11/7/2021 3:43:20 PM     Symptomatic Influenza A/B & SARS-CoV2 (COVID-19) Virus PCR Multiplex Nasopharyngeal    Specimen: Nasopharyngeal; Swab   Result Value Ref Range    Influenza A target Negative Negative    Influenza B target Negative Negative    SARS CoV2 PCR Negative Negative    Narrative    Testing was performed using the penny SARS-CoV-2 & Influenza A/B Assay on the penny [employer] System. This test should be ordered for the detection of SARS-CoV-2 and influenza viruses in individuals who meet clinical and/or epidemiological criteria. Test performance is unknown in asymptomatic patients. This test is for in vitro diagnostic use under the FDA  EUA for laboratories certified under CLIA to perform moderate and/or high complexity testing. This test has not been FDA cleared or approved. A negative result does not rule out the presence of PCR inhibitors in the specimen or target RNA in concentration below the limit of detection for the assay. If only one viral target is positive but coinfection with multiple targets is suspected, the sample should be re-tested with another FDA cleared, approved or authorized test, if coinfection would change clinical management. [facility] are certified under the Clinical Laboratory Improvement Amendments of 1988 (CLIA-88) as  qualified to perform moderate and/or high complexity laboratory testing.   Blood Culture Peripheral Blood    Specimen: Peripheral Blood   Result Value Ref Range    Culture No growth after 12 hours    Blood Culture Peripheral Blood    Specimen: Peripheral Blood   Result Value Ref Range    Culture No growth after 12 hours    XR Chest Port 1 View    Narrative    EXAM: XR CHEST PORT 1 VIEW  LOCATION: [facility]  DATE/TIME: 11/7/2021 4:20 PM    INDICATION: Fever.  COMPARISON: None.      Impression    IMPRESSION: Patchy bibasilar opacities may represent pneumonia. Hypoinflated lungs. Mild elevation of the right hemidiaphragm. Normal cardiac silhouette.   Blood gas venous   Result Value Ref Range    pH Venous 7.36 7.32 - 7.43    pCO2 Venous 33 (L) 40 - 50 mm Hg    pO2 Venous 74 (H) 25 - 47 mm Hg    Bicarbonate Venous 19 (L) 21 - 28 mmol/L    Base Excess/Deficit (+/-) -5.9 -7.7 - 1.9 mmol/L    FIO2 5    UA with Microscopic   Result Value Ref Range    Color Urine Light Yellow Colorless, Straw, Light Yellow, Yellow    Appearance Urine Clear Clear    Glucose Urine >=1000 (A) Negative mg/dL    Bilirubin Urine Negative Negative    Ketones Urine 60  (A) Negative mg/dL    Specific Gravity Urine 1.019 1.003 - 1.035    Blood Urine Negative Negative    pH Urine 5.0 5.0 - 7.0     Protein Albumin Urine Negative Negative mg/dL    Urobilinogen Urine Normal Normal, 2.0 mg/dL    Nitrite Urine Negative Negative    Leukocyte Esterase Urine Negative Negative    RBC Urine 0 <=2 /HPF    WBC Urine 0 <=5 /HPF    Squamous Epithelials Urine <1 <=1 /HPF   US Lower Extremity Venous Duplex Left    Narrative    EXAM: US LOWER EXTREMITY VENOUS DUPLEX LEFT  LOCATION: [facility]  DATE/TIME: 11/7/2021 7:07 PM    INDICATION: Asymmetric leg swelling. Post L TKA  COMPARISON: None.  TECHNIQUE: Venous Duplex ultrasound of the left lower extremity with and without compression, augmentation and duplex. Color flow and spectral Doppler with waveform analysis performed.    FINDINGS: Exam includes the common femoral, femoral, popliteal, and contralateral common femoral veins as well as segmentally visualized deep calf veins and greater saphenous vein.     LEFT: No deep vein thrombosis. No superficial thrombophlebitis. No popliteal cyst.      Impression    IMPRESSION:  1.  No deep venous thrombosis in the left lower extremity.   Glucose by meter   Result Value Ref Range    GLUCOSE BY METER POCT 137 (H) 70 - 99 mg/dL   MRSA MSSA PCR, Nasal Swab    Specimen: Nose; Swab   Result Value Ref Range    MRSA Target DNA Negative Negative    SA Target DNA Negative     Narrative    The Michael Bieker  Xpert SA Nasal Complete assay performed in the [employer]  Dx System is a qualitative in vitro diagnostic test designed for rapid detection of Staphylococcus aureus (SA) and methicillin-resistant Staphylococcus aureus (MRSA) from nasal swabs in patients at risk for nasal colonization. The test utilizes automated real-time polymerase chain reaction (PCR) to detect MRSA/SA DNA. The Xpert SA Nasal Complete assay is intended to aid in the prevention and control of MRSA/SA infections in healthcare settings. The assay is not intended to diagnose, guide or monitor treatment for MRSA/SA infections, or provide results of  susceptibility to methicillin. A negative result does not preclude MRSA/SA nasal colonization.    Glucose by meter   Result Value Ref Range    GLUCOSE BY METER POCT 113 (H) 70 - 99 mg/dL   Basic metabolic panel   Result Value Ref Range    Sodium 136 133 - 144 mmol/L    Potassium 3.7 3.4 - 5.3 mmol/L    Chloride 105 94 - 109 mmol/L    Carbon Dioxide (CO2) 23 20 - 32 mmol/L    Anion Gap 8 3 - 14 mmol/L    Urea Nitrogen 19 7 - 30 mg/dL    Creatinine 0.87 0.66 - 1.25 mg/dL    Calcium 8.6 8.5 - 10.1 mg/dL    Glucose 92 70 - 99 mg/dL    GFR Estimate 82 >60 mL/min/1.73m2   CBC with platelets   Result Value Ref Range    WBC Count 11.2 (H) 4.0 - 11.0 10e3/uL    RBC Count 2.40 (L) 4.40 - 5.90 10e6/uL    Hemoglobin 7.5 (L) 13.3 - 17.7 g/dL    Hematocrit 25.1 (L) 40.0 - 53.0 %     (H) 78 - 100 fL    MCH 31.3 26.5 - 33.0 pg    MCHC 29.9 (L) 31.5 - 36.5 g/dL    RDW 13.6 10.0 - 15.0 %    Platelet Count 386 150 - 450 10e3/uL   Glucose by meter   Result Value Ref Range    GLUCOSE BY METER POCT 80 70 - 99 mg/dL

## 2021-11-08 NOTE — PHARMACY-VANCOMYCIN DOSING SERVICE
"Pharmacy Vancomycin Initial Note  Date of Service 2021  Patient's  1940  81 year old, male    Indication: [facility]-Associated Pneumonia    Current estimated CrCl = Estimated Creatinine Clearance: 66.6 mL/min (based on SCr of 0.9 mg/dL).    Creatinine for last 3 days  2021:  3:01 PM Creatinine 0.90 mg/dL    Recent Vancomycin Level(s) for last 3 days  No results found for requested labs within last 72 hours.      Vancomycin IV Administrations (past 72 hours)      No vancomycin orders with administrations in past 72 hours.                Nephrotoxins and other renal medications (From now, onward)    Start     Dose/Rate Route Frequency Ordered Stop    21 2300  piperacillin-tazobactam (ZOSYN) 3.375 g vial to attach to  mL bag        Note to Pharmacy: For [facility], [facility] and [facility]: For Zosyn-naive patients, use the \"Zosyn initial dose + extended infusion\" order panel.    3.375 g  over 30 Minutes Intravenous EVERY 6 HOURS 21            Contrast Orders - past 72 hours (72h ago, onward)            None          Loading dose: 1500 mg at 21:00 2021.  Regimen: 1250 mg IV every 24 hours.  Start time: 20:25 on 2021  Exposure target: AUC24 (range)400-600 mg/L.hr   AUC24,ss: 432 mg/L.hr  Probability of AUC24 > 400: 59 %  Ctrough,ss: 12.1 mg/L  Probability of Ctrough,ss > 20: 13 %  Probability of nephrotoxicity (Lodise LENNIE ): 7 %          Plan:  1. Start vancomycin  1500 mg IV x 1 as a loading dose and then continue with 1250 mg IV every 24 hours  2. Vancomycin monitoring method: AUC  3. Vancomycin therapeutic monitoring goal: 400-600 mg*h/L  4. Pharmacy will check vancomycin levels as appropriate in 1-3 Days.    5. Serum creatinine levels will be ordered daily for the first week of therapy and at least twice weekly for subsequent weeks.      Eric Gibbons [facility]  "

## 2021-11-08 NOTE — PLAN OF CARE
Cognitive Concerns/ Orientation : A&O to self and place. Arouses to voice. Panamanian speaking. Granddaughter assisted with communication, IPad in room.    BEHAVIOR & AGGRESSION TOOL COLOR: Green  CIWA SCORE: NA    ABNL VS/O2: VSS with HR in the 's. On 3L O2 via NC, LS diminished. Max temp of 103 in ED, now 99.4 after oral Tylenol.  MOBILITY: Bedrest, swollen joints and hands. Pain noted with repositioning.   PAIN MANAGMENT: Tylenol Q6hr   DIET: Mod-carb. Family reports poor appetite over the last few days. Taking in fluid orally and swallows pills whole. Family assisted with food preferences.   BOWEL/BLADDER: External catheter in place. No BM this shift.   ABNL LAB/BG: , Na 129, Alb 2.4, WBC 13.4, Hgb 10.0  DRAIN/DEVICES: PIV in RFA SL with vanco and zosyn ordered.   TELEMETRY RHYTHM: NA   SKIN: Surgical incision in L knee. Pink coccyx, intact.   TESTS/PROCEDURES: XR and US completed in ED. Possible LLL pneumonia.   D/C DAY/GOALS/PLACE: Pending improvements. Ortho consulted.  OTHER IMPORTANT INFO: Bed alarm on for safety. Family member will plan on visiting tomorrow.

## 2021-11-08 NOTE — PLAN OF CARE
Summary: Confusion, weakness, fever, swollen joints   DATE & TIME: 11/08/2021  [id number]  Cognitive Concerns/ Orientation: A&O x3, disoriented to situation. Greenlandic speaking only, son at bedside to interpret. Per management, is OK for family to stay the night. Must be the same person all day and than the next day someone else can come and stay the night. No switching out people. 1 person a day.  BEHAVIOR & AGGRESSION TOOL COLOR: Green  CIWA SCORE: N/A  ABNL VS/O2: VSS on 2L NC  MOBILITY: Total, turn and repo Q2H  PAIN MANAGMENT: C/o R arm and elbow pain, Tylenol given x1  DIET: Mod carb, family assists with ordering food  BOWEL/BLADDER: External catheter in place. Large BM this shift. Incontinent.  ABNL LAB/BG: BG 80, 142, Hgb 7.5, .0, WBC 11.2  DRAIN/DEVICES: PIV in RFA SL with Zosyn ordered  TELEMETRY RHYTHM: NSR   SKIN: Surgical incision in L knee. Purpleish/dusky on bottom. R hand edema +3, L hand edema +2, L leg edema +3, R leg edema +2  TESTS/PROCEDURES: US of R elbow completed  D/C DAY/GOALS/PLACE: Discharge pending  OTHER IMPORTANT INFO: LS diminished, BS active x4. Started on PO Predisone today. PT consulted. Per orthopedic surgery they do not believe knee joint is infected and think fevers and fatigue is due to PNA.

## 2021-11-08 NOTE — PROGRESS NOTES
[facility]    Medicine Progress Note - Hospitalist Service       Date of Admission:  11/7/2021    Assessment & Plan         Stephen Solorio is a 81 year old male admitted on 11/7/2021. He presents with sepsis     Sepsis  Unclear source (PNA vs artificial joint)  Severe OA s/p L TKA 11/1/21  Presents with lethargy, hypoxia, recent TKA.  Reports cough, but ROS otherwise negative.  CXR with possible bibasilar infiltrates  Joint does not appear obviously atypical from expected appearance post op  - fever curve improving and WBC improving  - remains on 2L of oxygen this morning    > Ortho evaluated and agrees with pna diagnosis, no indication for infected joint especially this early post operatively  > continue zosyn for pna, stop vanco for negative MRSA swab  > continue supplemental O2 and good pulmonary hygiene  > PT consult    RUE swelling  Large painful mass on R elbow, most consistent with olecranon bursitis    > Will get US for evaluation and may ask ortho to weigh in on these findings, CRP is very elevated and the arm is warm and red however picture skewed with hx of gout and RA      RA  Gout  Asymmetric BLE edema  - Methotrexate has been on hold post op, continue to hold  - continue PTA prednisolone    > US negative for clot  > abx as above  > continue allopurinol     DMT2  hgbA1c 9.6 10/21  - hold PTA metformin, empagliflozin  - MDSSI  - QID BG      Hyponatremia, likely hypovolemic  - improvement with IVF, continue to monitor and encourage PO intake  - monitor           Diet: Moderate Consistent Carb (60 g CHO per Meal) Diet    DVT Prophylaxis: lovenox  Naik Catheter: Not present  Central Lines: None  Code Status: Full Code      Disposition Plan   Expected discharge: 11/11/2021   recommended to prior living situation, TCU for further rehab.  The patient's care was discussed with the patient, son and bedside nurse.    Mandy Vigil, DO  Hospitalist Service  [facility]  [facility]  Securely message with the Vocera Web Console (learn more here)  Text page via [employer] Paging/Directory        Clinically Significant Risk Factors Present on Admission             # Coagulation Defect: home medication list includes an anticoagulant medication       ______________________________________________________________________    Interval History   Patient had large loose BM this AM. He is awake and alert and appears to be clearing mentally. Was more confused on admission. He denies nausea. Endorses mild cough. No fever or chills that he is aware of. Pain at his R elbow with swelling. No major pain at his knee.  Interpretation provided by son.    Data reviewed today: I reviewed all medications, new labs and imaging results over the last 24 hours. I personally reviewed CXR with bilateral infiltrates.    Physical Exam   Vital Signs: Temp: 98.5  F (36.9  C) Temp src: Axillary BP: 120/54 Pulse: 86   Resp: 20 SpO2: 98 % O2 Device: Nasal cannula Oxygen Delivery: 2 LPM  Weight: 155 lbs 13.84 oz     Constitutional: Awake, alert, cooperative, no apparent distress, on NC  Respiratory: no wheezing, crackles R>L  Cardiovascular: Regular rate and rhythm, normal S1 and S2, and no murmur noted  GI: Normal bowel sounds, soft, non-distended, non-tender  Skin/Integumen: No rashes, no cyanosis, +1 edema in LLE  MSK: mild arthritic joint changes in hands, swollen bilateral knees, one with midline incision, not warm or red. R elbow large tender, mobile lesion, warm    Data   Recent Labs   Lab 11/08/21  1127 11/08/21  0817 11/08/21  0803 11/07/21  2233 11/07/21  1501   WBC  --   --  11.2*  --  13.4*   HGB  --   --  7.5*  --  10.0*   MCV  --   --  105*  --  101*   PLT  --   --  386  --  422   NA  --   --  136  --  129*   POTASSIUM  --   --  3.7  --  3.8   CHLORIDE  --   --  105  --  95   CO2  --   --  23  --  25   BUN  --   --  19  --  18   CR  --   --  0.87  --  0.90   ANIONGAP  --   --  8  --  9   BRADY  --   --   8.6  --  9.4   * 80 92   < > 146*   ALBUMIN  --   --   --   --  2.4*   PROTTOTAL  --   --   --   --  7.1   BILITOTAL  --   --   --   --  0.8   ALKPHOS  --   --   --   --  137   ALT  --   --   --   --  20   AST  --   --   --   --  15    < > = values in this interval not displayed.     Recent Results (from the past 24 hour(s))   XR Chest Port 1 View    Narrative    EXAM: XR CHEST PORT 1 VIEW  LOCATION: [facility]  DATE/TIME: 11/7/2021 4:20 PM    INDICATION: Fever.  COMPARISON: None.      Impression    IMPRESSION: Patchy bibasilar opacities may represent pneumonia. Hypoinflated lungs. Mild elevation of the right hemidiaphragm. Normal cardiac silhouette.   US Lower Extremity Venous Duplex Left    Narrative    EXAM: US LOWER EXTREMITY VENOUS DUPLEX LEFT  LOCATION: [facility]  DATE/TIME: 11/7/2021 7:07 PM    INDICATION: Asymmetric leg swelling. Post L TKA  COMPARISON: None.  TECHNIQUE: Venous Duplex ultrasound of the left lower extremity with and without compression, augmentation and duplex. Color flow and spectral Doppler with waveform analysis performed.    FINDINGS: Exam includes the common femoral, femoral, popliteal, and contralateral common femoral veins as well as segmentally visualized deep calf veins and greater saphenous vein.     LEFT: No deep vein thrombosis. No superficial thrombophlebitis. No popliteal cyst.      Impression    IMPRESSION:  1.  No deep venous thrombosis in the left lower extremity.

## 2021-11-09 NOTE — PROGRESS NOTES
Orthopedic Surgery  Stephen Solorio  2021  Admit Date:  2021  POD: #8 Left TKA  Right olecranon bursitis    Alert and oriented to person, place, and time.  Patient resting comfortably in chair   Reports pain in knees and elbow slightly improved today.   Tolerating oral intake.      Vital Sign Ranges  Temperature Temp  Av.4  F (37.4  C)  Min: 98.4  F (36.9  C)  Max: 100.4  F (38  C)   Blood pressure Systolic (24hrs), Av , Min:108 , Max:132        Diastolic (24hrs), Av, Min:52, Max:66      Pulse Pulse  Av.6  Min: 86  Max: 99   Respirations Resp  Av.8  Min: 18  Max: 20   Pulse oximetry SpO2  Av.4 %  Min: 96 %  Max: 99 %       Left knee:   Dressing is clean, dry, and intact. Scant drainage.  Moderate effusion present.  Minimal erythema of the surrounding skin. TTP left knee.   Bilateral calves are soft, non-tender.  Left lower extremity is NVI.  Sensation intact bilateral lower extremities  Patient able to resist dorsi and plantar flexion bilaterally  +Dp pulse    Right knee: moderate to large effusion.  TTP and limited ROM    Right elbow:  Olecranon bursa swelling.  Minimal erythema and mild TTP.  Limited ROM of the elbow (chronic) but is pain-free within the joint.  Unable to extend fingers 4&5 and limited motion of the right thumb (chronic).  There is swelling within the dorsum of right hand.  Pulses and sensation intact.      Labs:  Recent Labs   Lab Test 21  0803 21  1501   POTASSIUM 3.5 3.7 3.8     Recent Labs   Lab Test 21  0803 21  1501   HGB 7.6* 7.5* 10.0*     No results for input(s): INR in the last 88133 hours.  Recent Labs   Lab Test 21  0803 21  1501   * [phone]       1. PLAN:   Continue Lovenox for DVT prophylaxis per medicine.     Continue allopurinol and prednisone   Right elbow bursa, bilateral knees are not appearing septic    Abx per medicine team.    Mobilize with  PT/OT    WBAT bilateral LEs.     Continue current pain regiment.   Dressings: Keep intact left knee.  Change if >60% saturated   Follow-up: with primary knee surgeon for post-op appt.      Elda Reina PA-C

## 2021-11-09 NOTE — PLAN OF CARE
Cognitive Concerns/ Orientation: A&O x2-3; disoriented to time, situation. Prydeinig speaking only, son at bedside to interpret. Per management, is OK for family to stay the night. Must be the same person all day and then the next day someone else can come and stay the night. No switching out people. 1 person a day.  BEHAVIOR & AGGRESSION TOOL COLOR: Green  CIWA SCORE: N/A  ABNL VS/O2: Max Temperature 100.4; down to  after Tylenol.  Other VSS on 2L NC  MOBILITY: up with a assist of 2 and walker and gait belt  PAIN MANAGMENT: c/o of right hand and knee pain-PRN Tylenol and ice  DIET: Mod carb, family assists with ordering food and assisted pt in eating  BOWEL/BLADDER: External catheter in place.  ABNL LAB/BG: , 135 Hgb 7.5, .0, WBC 11.2  DRAIN/DEVICES: PIV x1  TELEMETRY RHYTHM: NSR   SKIN: Surgical incision in L knee. Purpleish/dusky on bottom. R hand edema +4, L hand edema +2, L leg edema +3,  R knee edema +4  TESTS/PROCEDURES: none  D/C DAY/GOALS/PLACE: Discharge pending  OTHER IMPORTANT INFO: PT consulted.  On Zosyn for pneumonia.

## 2021-11-09 NOTE — PROGRESS NOTES
Medicine Progress Note - Hospitalist Service       Date of Admission:  11/7/2021    Assessment & Plan         Stephen Solorio is a 81 year old male admitted on 11/7/2021. He presents with sepsis     Sepsis, suspecting PNA  Severe OA s/p L TKA 11/1/21  Presents with lethargy, hypoxia, recent TKA.  Reports cough, but ROS otherwise negative.  CXR with possible bibasilar infiltrates  Joint did not appear obviously atypical from expected appearance post op and ortho evaluated and did not believe any acute joint infection  - appreciate ortho's assistance  - fever curve improving and off oxygen this morning    > continue zosyn for pna, stopped vanco for negative MRSA swab and unlikely to have infected joint  > PT recommending TCU which patient presented to hospital from    RUE swelling  Large painful mass on R elbow, most consistent with olecranon bursitis  - US revealed complex fluid collection    >Asked ortho to weigh in on the findings above in the setting of sepsis, picture skewed with hx of gout and RA      RA  Gout  - Methotrexate has been on hold post op, continue to hold  - continue PTA prednisolone    > Likely having acute RA flare at the [facility] with several painful joints, however in setting of acute sepsis cannot increase immunosuppressants.  > Informed family that he needs to get over his infection before we can treat his arthritis  > continue allopurinol and PRN tylenol     DMT2  hgbA1c 9.6 10/21  - hold PTA metformin, empagliflozin  - MDSSI  - QID BG      Hyponatremia, likely hypovolemic  - improvement with IVF yesterday and back down to 130 today  - encourage PO intake  - monitor           Diet: Moderate Consistent Carb (60 g CHO per Meal) Diet    DVT Prophylaxis: lovenox  Naik Catheter: Not present  Central Lines: None  Code Status: Full Code      Disposition Plan   Expected discharge: Likely stable for discharge tomorrow on oral antibiotics if he remains a  febrile. Will need TCU    The patient's care was discussed with the patient, son and bedside nurse.    Mandy Vigil DO  Hospitalist Service  [facility]  Securely message with the Vocera Web Console (learn more here)  Text page via [facility] Paging/Directory        Clinically Significant Risk Factors Present on Admission                ______________________________________________________________________    Interval History   Patient up in chair today. He feeling well and looking better. His joints are swollen and they hurts. Most painful is his R elbow. He denies cough. Eating well. Episode of diarrhea yesterday. Son and daughter in law provided interpretation.    Data reviewed today: I reviewed all medications, new labs and imaging results over the last 24 hours.US report of RUE.    Physical Exam   Vital Signs: Temp: 98.4  F (36.9  C) Temp src: Oral BP: 115/66 Pulse: 89   Resp: 20 SpO2: 96 % O2 Device: Nasal cannula Oxygen Delivery: 2 LPM  Weight: 155 lbs 13.84 oz     Constitutional: Awake, alert, cooperative, no apparent distress,  Respiratory: no wheezing, crackles R>L  Cardiovascular: Regular rate and rhythm, normal S1 and S2, and no murmur noted  GI: Normal bowel sounds, soft, non-distended, non-tender  Skin/Integumen: No rashes, no cyanosis, +1 edema in LLE  MSK: Swollen R hand and R elbow with pain on movement, R knee warm and swollen and L knee with incision, clean but swollen    Data   Recent Labs   Lab 11/09/21  0827 11/09/21  0803 11/09/21  0220 11/08/21  0817 11/08/21  0803 11/07/21  2233 11/07/21  1501   WBC 13.5*  --   --   --  11.2*  --  13.4*   HGB 7.6*  --   --   --  7.5*  --  10.0*   *  --   --   --  105*  --  101*   *  --   --   --  386  --  422   *  --   --   --  136  --  129*   POTASSIUM 3.5  --   --   --  3.7  --  3.8   CHLORIDE 99  --   --   --  105  --  95   CO2 23  --   --   --  23  --  25   BUN 18  --   --   --  19  --  18   CR 0.82  --   --    --  0.87  --  0.90   ANIONGAP 8  --   --   --  8  --  9   BRADY 8.9  --   --   --  8.6  --  9.4   * 122* 135*   < > 92   < > 146*   ALBUMIN  --   --   --   --   --   --  2.4*   PROTTOTAL  --   --   --   --   --   --  7.1   BILITOTAL  --   --   --   --   --   --  0.8   ALKPHOS  --   --   --   --   --   --  137   ALT  --   --   --   --   --   --  20   AST  --   --   --   --   --   --  15    < > = values in this interval not displayed.     Recent Results (from the past 24 hour(s))   US Upper Extremity Non Vascular Right    Narrative    US RIGHT UPPER EXTREMITY NONVASCULAR ULTRASOUND  11/8/2021 1:17 PM     HISTORY: Evaluate elbow for bursitis. Painful elbow mass.    FINDINGS: Sonography was performed in the area of clinical concern  (posterior elbow).      Impression    IMPRESSION: Complex fluid collection measuring 4.6 x 4 x 2.5 cm,  located at the olecranon bursa region. Clinical correlation  recommended.    GRISELDA ZAMORA MD         SYSTEM ID:  [id number]

## 2021-11-09 NOTE — PROGRESS NOTES
SW:  D: Anticipation discharge on Wednesday if patient stays afebrile per Dr Vigil.   Patient came from [facility].   Writer spoke with Aydee in admissions at [facility].    He does have a bed hold there under his MA plan.  [place] does have restricted visiting rules at this time which means patient can only have one compassionate caregiver who visits.  Aydee requests this be reviewed with family and to determine if the family does want patient to return with this limitation.  Writer met daughter in law of patient.  She requested writer call patient's granddaughter Celeste at [phone] as Celeste assists with interpreting. Writer called and left Celeste a message to call back yue.

## 2021-11-09 NOTE — PROGRESS NOTES
11/08/21 2115   Quick Adds   Type of Visit Initial PT Evaluation       Present yes  (iPad and patient's son)   Language Maldivian   Living Environment   People in home child(yoav), adult  (Son and daughter in law)   Current Living Arrangements house   Home Accessibility stairs to enter home   Number of Stairs, Main Entrance 2   Stair Railings, Main Entrance railings safe and in good condition;railing on left side (ascending)   Transportation Anticipated family or friend will provide   Living Environment Comments Lives with son and daughter in law. Has been at [facility] after TKA.   Self-Care   Usual Activity Tolerance moderate   Current Activity Tolerance fair   Equipment Currently Used at Home cane, straight   Activity/Exercise/Self-Care Comment Pt's son reports he is independent with a SEC at baseline. Recent L TKA 11/1 at [facility] and has been at [facility] since. Son reports he went to [place] on Thursday-Sunday before coming to [facility].   Disability/Function   Hearing Difficulty or Deaf no   Wear Glasses or Blind yes   Vision Management Glasses   Fall history within last six months no   General Information   Onset of Illness/Injury or Date of Surgery 11/07/21   Referring Physician Mandy Vigil, DO   Pertinent History of Current Problem (include personal factors and/or comorbidities that impact the POC)  81 year old male with history of type II diabetes, rheumatoid arthritis, gout, primary osteoarthritis of both knees, and hyponatremia who presents with fever. The patient had knee surgery on Monday at the [facility] and was discharged to [place]. Since then he has been in rehabilitation.   Existing Precautions/Restrictions fall   Weight-Bearing Status - LLE weight-bearing as tolerated   General Observations Pt with some confusion and difficulty hearing/understanding the ipad . Pt's son and daughter in law helped  "with interpreting.   Cognition   Orientation Status (Cognition) oriented to;person;place  (Stated birthday [birth year], stated \"hospital\")   Affect/Mental Status (Cognition) confused  (Slight confusion)   Follows Commands (Cognition) follows one-step commands;over 90% accuracy;verbal cues/prompting required   Pain Assessment   Patient Currently in Pain No   Integumentary/Edema   Integumentary/Edema Comments L TKA dressing. Gout flare-up to R elbow   Posture    Posture Forward head position;Protracted shoulders   Range of Motion (ROM)   ROM Quick Adds ROM deficits secondary to surgical procedure;ROM deficits secondary to weakness;Knee, Left   ROM Comment L knee flexion ~100 degrees, AROM limited d/t weakness   Strength   Manual Muscle Testing Quick Adds Deficits observed during functional mobility   Strength Comments L LE weakness s/p L TKA; generalized LE weakness   Bed Mobility   Comment (Bed Mobility) Patient up in recliner, not assessed   Transfers   Transfer Safety Comments Sit<>stand minAx2 to FWW   Gait/Stairs (Locomotion)   Comment (Gait/Stairs) Amb in room minAx2 FWW   Balance   Balance Comments UE support on walker, Ax2 for balance   Sensory Examination   Sensory Perception   (Not discussed)   Clinical Impression   Criteria for Skilled Therapeutic Intervention yes, treatment indicated   PT Diagnosis (PT) Impaired mobility   Influenced by the following impairments Generalized weakness, impaired activity tolerance, s/p L TKA, pain, gout flare-up   Functional limitations due to impairments Decreased functional independence   Clinical Presentation Stable/Uncomplicated   Clinical Presentation Rationale See MR per clinical judgement   Clinical Decision Making (Complexity) low complexity   Therapy Frequency (PT) Daily   Predicted Duration of Therapy Intervention (days/wks) 5 days   Planned Therapy Interventions (PT) balance training;bed mobility training;gait training;home exercise program;patient/family " education;ROM (range of motion);stair training;strengthening;transfer training;progressive activity/exercise;stretching;neuromuscular re-education   Anticipated Equipment Needs at Discharge (PT) walker, rolling  (FWW; has cane)   Risk & Benefits of therapy have been explained evaluation/treatment results reviewed;care plan/treatment goals reviewed;risks/benefits reviewed;current/potential barriers reviewed;participants voiced agreement with care plan;participants included;patient;son   PT Discharge Planning    PT Discharge Recommendation (DC Rec) Transitional Care Facility   PT Rationale for DC Rec Patient is currently assist of 2 for mobility and admitted from . He would benefit from continued skilled PT at TCU to improve safety and independence with mobility prior to discharge home. If he were to discharge home he would need assist of 2 for all mobility and home health PT.   PT Brief overview of current status  Sit<>stands min-modAx2, short distance amb with FWW and minAx2   Total Evaluation Time   Total Evaluation Time (Minutes) 15

## 2021-11-09 NOTE — TELEPHONE ENCOUNTER
What type of discharge? Inpatient  Risk of Hospital admission or ED visit: 46%  Is a TCM episode required? Yes  When should the patient follow up with PCP? 7 days of discharge.    Winston Acuna RN  [facility]      Pt is currently at the hospital. Encounter will be closed.

## 2021-11-10 NOTE — PROGRESS NOTES
Care Management Follow Up    Length of Stay (days): 3    Expected Discharge Date: 11/11/2021     Concerns to be Addressed: discharge planning     Patient plan of care discussed at interdisciplinary rounds: Yes    Anticipated Discharge Disposition:  (TCU vs home)     Anticipated Discharge Services:    Anticipated Discharge DME:      Patient/family educated on Medicare website which has current facility and service quality ratings:    Education Provided on the Discharge Plan:    Patient/Family in Agreement with the Plan:      Referrals Placed by CM/SW:    Private pay costs discussed: medivan transportation    Additional Information:  Just received a call from gwyn Alonso.  She has spoken with family and they want to bring patient home.    Discussed DME needs with Celeste, RN Care Coordinator, bedside RN, PT  and Dr Villegas.  Per CC, patient will not qualify for a hospital bed.  Bedside nurse reports he has been able get in and out the hospital bed without use of side rails.  PT will issue patient a walker. PT will work with patient in the morning regarding stairs, though Celeste believes the home patient returns to has a ramp. There is only one step going into the house and patient's living area is all on the first floor.  An OT consult has been entered to assess for bathroom DME such as the need for a raised toliet seat.  OT will schedule their evaluation for the morning.  Family is requesting a medivan ride home, patient's MA plan will cover the transport cost.  Currently [facility] medivan is scheduled for 1115.  This is contingent upon OT being able to see patient early to mid morning.  Celeste aware of tentative time.  Celeste will try to be here in the morning in order to have the discharge instructions reviewed with her.  Patient will have home care for PT and OT.      Amber Davis, NATANSW

## 2021-11-10 NOTE — PROGRESS NOTES
Spoke with patients grandSharadirmajasmin Keyonna regarding home care services.. GD in agreement. Information sent to [facility] . Await response   Bree Dhaliwal RN  Inpatient Care Coordinator  [facility]  # [phone]

## 2021-11-10 NOTE — CONSULTS
Care Management Initial Consult    General Information  Assessment completed with:  ,     granddaughter Celeste, please see progress note filed today at 1:57    Primary Care Provider verified and updated as needed:     Readmission within the last 30 days:   yes        Advance Care Planning:            Communication Assessment  Patient's communication style: spoken language (non-English)    Hearing Difficulty or Deaf: no   Wear Glasses or Blind: yes    Cognitive  Cognitive/Neuro/Behavioral: .WDL except  Level of Consciousness: confused  Arousal Level: opens eyes spontaneously,arouses to touch/gentle shaking,arouses to voice,arouses to pain  Orientation: disoriented to,time,situation  Mood/Behavior: flat affect  Best Language: 0 - No aphasia  Speech: slow    Living Environment:   People in home:   Lives with son     Current living Arrangements: house      Able to return to prior arrangements:  Yes, if he has 24/7 care.        Family/Social Support:  Care provided by:    Provides care for:                  Description of Support System:           Current Resources:   Patient receiving home care services:       Community Resources:    Equipment currently used at home: cane, straight  Supplies currently used at home:      Employment/Financial:  Employment Status:          Financial Concerns:             Lifestyle & Psychosocial Needs:  Social Determinants of Health     Tobacco Use: Not on file   Alcohol Use: Not on file   Financial Resource Strain: Not on file   Food Insecurity: Not on file   Transportation Needs: Not on file   Physical Activity: Not on file   Stress: Not on file   Social Connections: Not on file   Intimate Partner Violence: Not on file   Depression: Not on file   Housing Stability: Not on file       Functional Status:  Prior to admission patient needed assistance:              Mental Health Status:          Chemical Dependency Status:                Values/Beliefs:  Spiritual, Cultural Beliefs, Hoahaoism  Practices, Values that affect care:                 Additional     Amber Davis, [facility] Light [facility] Hill HospitalSW

## 2021-11-10 NOTE — PROGRESS NOTES
Care Management Follow Up    Length of Stay (days): 3    Expected Discharge Date: 11/11/2021     Concerns to be Addressed:       Patient plan of care discussed at interdisciplinary rounds: Yes    Anticipated Discharge Disposition:       Anticipated Discharge Services:    Anticipated Discharge DME:      Patient/family educated on Medicare website which has current facility and service quality ratings:    Education Provided on the Discharge Plan:    Patient/Family in Agreement with the Plan:      Referrals Placed by CM/SW:    Private pay costs discussed: Not applicable    Additional Information:  Writer spoke with granddaughter Cassie today at [phone]. Celeste is the  for discharge planning as she can interpret for her father and uncle and extended family.  Purpose of call was to determine if patient is returning to [place].  [facility] [place] did request that writer review their  visiting policy if patient returns to [place].    Following reviewed with Cassie.  [place] is under COVID restricted visiting at this time. [facility] will justify qualifying two family members as Essential Caregivers if the two family members can speak both Barbadian and English. Patient can only have one visitor at a time during the hours of 8am and 8pm.  Cassie said family has been discussing other options than returning to [facility] such as a different TCU which has open visiting or taking him home where he lives with his son.    Shared PT recommendation of TCU or if patient goes home he will need assistance of one person abd home therapies. Writer spoke with bedside RN who felt discharge to home is feasible as long as patient has one family member to assist.   Writer explained the frequency of home therapies is 2-3 times a week.   Cassie will discuss the options with her family and writer requested they make a decision and inform writer by tomorrow morning.  In the meantime, writer will inquire if there are other TCU options in the area which are available  on day of discharge. At this time the room at [facility] is being held for patient.         NATAN SantoyoSW

## 2021-11-10 NOTE — PROGRESS NOTES
Orthopedic Surgery  Stephen Solorio  11/10/2021  Admit Date:  2021  POD: #8 Left TKA  Right olecranon bursitis  Right knee effusion    Alert and oriented to person, place, and time.  Patient resting comfortably in chair.  States his pain is improving.      Pain controlled.  Tolerating oral intake.      Vital Sign Ranges  Temperature Temp  Av.1  F (36.7  C)  Min: 97.8  F (36.6  C)  Max: 98.7  F (37.1  C)   Blood pressure Systolic (24hrs), Av , Min:114 , Max:129        Diastolic (24hrs), Av, Min:52, Max:72      Pulse Pulse  Av.2  Min: 67  Max: 80   Respirations Resp  Av  Min: 16  Max: 18   Pulse oximetry SpO2  Av.8 %  Min: 92 %  Max: 99 %       Left knee:   Dressing is clean, dry, and intact. Scant drainage.  Moderate effusion present.  Minimal erythema of the surrounding skin. TTP left knee. Able to extend 7-90.   Bilateral calves are soft, non-tender.  Left lower extremity is NVI.  Sensation intact bilateral lower extremities  Patient able to resist dorsi and plantar flexion bilaterally  +Dp pulse     Right knee: moderate effusion, decreased from yesterday.  TTP globally but able to flex and extend actively with very little pain.       Right elbow:  Olecranon bursa swelling.  No erythema and mild TTP.  Limited ROM of the elbow (chronic) but is pain-free within the joint.  Unable to extend fingers 4&5 and limited motion of the right thumb (chronic).  There is swelling within the dorsum of right hand.  Pulses and sensation intact.         Labs:  Recent Labs   Lab Test 11/10/21  0820 11/09/21  0827 11/08/21  0803   POTASSIUM 3.4 3.5 3.7     Recent Labs   Lab Test 11/10/21  0820 11/09/21  0827 11/08/21  0803   HGB 7.9* 7.6* 7.5*     No results for input(s): INR in the last 87682 hours.  Recent Labs   Lab Test 11/10/21  0820 11/09/21  0827 11/08/21  0803   * 491* 386     CRP: 372-->177  WBC: 10       1. PLAN: Patient's joint pain is improving.               Continue Lovenox  for DVT prophylaxis per medicine.                Continue allopurinol and prednisone              Right elbow bursa, bilateral knees are not appearing septic.  Pad right elbow when in chair.                Abx per medicine team.               Mobilize with PT/OT               WBAT bilateral LEs.                Continue current pain regiment.              Dressings: Keep intact left knee.  Change if >60% saturated              Follow-up: with primary knee surgeon for post-op appt.    Elda Reina PA-C

## 2021-11-10 NOTE — PLAN OF CARE
Cognitive Concerns/ Orientation: A&Ox2 disoriented to time and situation. South African speaking only,daughter inlaw at bedside to interpret. Per management, is OK for family to stay the night. Must be the same person all day and then the next day someone else can come and stay the night. No switching out people. 1 person a day.  BEHAVIOR & AGGRESSION TOOL COLOR: Green  CIWA SCORE: N/A  ABNL VS/O2: afebrile, on room air  PAIN MANAGMENT: Denies pain  DIET: Mod carb, family assists with ordering food and assisted pt in eating to ensure he eats food  BOWEL/BLADDER: incontinent of bowel and bladder  ABNL LAB/BG: , 171. WBCs 13.5. Na 130. Hgb 7.6  DRAIN/DEVICES: PIV x1  TELEMETRY RHYTHM: NSR   SKIN: Surgical incision in L knee-dressing c/d/I. R hand edema +3, L hand edema +2, L leg edema +3,  R knee edema +2; improving.   TESTS/PROCEDURES: none  D/C DAY/GOALS/PLACE: Discharge pending  OTHER IMPORTANT INFO: PT consulted.  On Zosyn for pneumonia; lung sounds, crackles posteriorly

## 2021-11-10 NOTE — PROGRESS NOTES
[facility]    Medicine Progress Note - Hospitalist Service       Date of Admission:  11/7/2021    Assessment & Plan         81 year old male admitted on 11/7/2021. He presents with sepsis     Sepsis  Pneumonia -- improving   -- will switch to Ceftriaxone IV then PO antibiotics tomorrow    Severe OA s/p L TKA 11/1/21   -- doing better, still needs assist when up     RUE swelling -- Large painful mass on R elbow, most consistent with olecranon bursitis   --  US revealed complex fluid collection      RA  Gout   -- Methotrexate has been on hold post op, continue to hold   -- continue PTA prednisolone   -- Likely having acute RA flare at the [facility] with several painful joints, however in setting of acute sepsis cannot increase immunosuppressants.     DMT2 -- hgbA1c 9.6 10/21  - hold PTA metformin, empagliflozin  - MDSSI  - QID BG      Hyponatremia, likely hypovolemic   -- improvement with IV fluids        Diet: Moderate Consistent Carb (60 g CHO per Meal) Diet    DVT Prophylaxis: lovenox  Naik Catheter: Not present  Central Lines: None  Code Status: Full Code      Disposition Plan   Expected discharge: Likely stable for discharge tomorrow on oral antibiotics, he prefers to go home and stay with son.  Discussed with daughter-in-law and granddaughter and care coordinator.     Darwin Villalta MD  Hospitalist Service  [facility]  Securely message with the Vocera Web Console (learn more here)  Text page via [employer] Paging/Directory      ______________________________________________________________________    Interval History   Feels OK, comfortable on room air, up sitting in chair.     Physical Exam   Vital Signs: Temp: 97.6  F (36.4  C) Temp src: Oral BP: 131/66 Pulse: 75   Resp: 16 SpO2: 95 % O2 Device: None (Room air)    Weight: 155 lbs 13.84 oz     Constitutional: Awake, alert, cooperative, no apparent distress,  Respiratory: clear   Cardiovascular: Regular  rate and rhythm, normal S1 and S2, and no murmur noted  GI: Normal bowel sounds, soft, non-distended, non-tender  Skin/Integumen: No rashes, no cyanosis, +1 edema in LLE  MSK: Swollen R hand and R elbow with pain on movement, R knee warm and swollen and L knee with incision, clean with minimal swelling     Data   Recent Labs   Lab 11/10/21  1219 11/10/21  0820 11/09/21  1313 11/09/21  0827 11/08/21  0817 11/08/21  0803 11/07/21  2233 11/07/21  1501   WBC  --  10.3  --  13.5*  --  11.2*  --  13.4*   HGB  --  7.9*  --  7.6*  --  7.5*  --  10.0*   MCV  --  103*  --  103*  --  105*  --  101*   PLT  --  554*  --  491*  --  386  --  422   NA  --  137  --  130*  --  136  --  129*   POTASSIUM  --  3.4  --  3.5  --  3.7  --  3.8   CHLORIDE  --  104  --  99  --  105  --  95   CO2  --  25  --  23  --  23  --  25   BUN  --  19  --  18  --  19  --  18   CR  --  0.67  --  0.82  --  0.87  --  0.90   ANIONGAP  --  8  --  8  --  8  --  9   BRADY  --  9.3  --  8.9  --  8.6  --  9.4   * 165*  153*   < > 110*   < > 92   < > 146*   ALBUMIN  --   --   --   --   --   --   --  2.4*   PROTTOTAL  --   --   --   --   --   --   --  7.1   BILITOTAL  --   --   --   --   --   --   --  0.8   ALKPHOS  --   --   --   --   --   --   --  137   ALT  --   --   --   --   --   --   --  20   AST  --   --   --   --   --   --   --  15    < > = values in this interval not displayed.     No results found for this or any previous visit (from the past 24 hour(s)).

## 2021-11-10 NOTE — PLAN OF CARE
Cognitive Concerns/ Orientation: A&O3. [place] speaking only, son at bedside to interpret. Per management, is OK for family to stay the night. Must be the same person all day and then the next day someone else can come and stay the night. No switching out people. 1 person a day. Daughter in law will stay the day and night.  BEHAVIOR & AGGRESSION TOOL COLOR: Green  CIWA SCORE: N/A  ABNL VS/O2: afebrile, on room air  PAIN MANAGMENT: c/o of right hand and knee pain-PRN Tylenol and ice-effective  DIET: Mod carb, family assists with ordering food and assisted pt in eating to ensure he eats food  BOWEL/BLADDER: incontinent  ABNL LAB/BG: bgm 122, 208 and 230. WBCs 13.5. Na 130. Hgb 7.6  DRAIN/DEVICES: PIV x1  TELEMETRY RHYTHM: NSR   SKIN: Surgical incision in L knee-dressing c/d/I. R hand edema +3, L hand edema +2, L leg edema +3,  R knee edema +3. Hands and right knee are hot to touch  TESTS/PROCEDURES: none  D/C DAY/GOALS/PLACE: Discharge pending  OTHER IMPORTANT INFO: PT consulted.  On Zosyn for pneumonia. Clear lung sounds. On room air. No cough. Up in the chair x1, on the commode x1

## 2021-11-11 PROBLEM — M17.11 PRIMARY OSTEOARTHRITIS OF RIGHT KNEE: Status: RESOLVED | Noted: 2021-01-01 | Resolved: 2021-01-01

## 2021-11-11 PROBLEM — M06.9 RA (RHEUMATOID ARTHRITIS) (H): Status: ACTIVE | Noted: 2021-01-01

## 2021-11-11 PROBLEM — E11.9 DIABETES MELLITUS, TYPE 2 (H): Status: ACTIVE | Noted: 2021-01-01

## 2021-11-11 PROBLEM — A41.9 SEPSIS (H): Status: ACTIVE | Noted: 2021-01-01

## 2021-11-11 PROBLEM — M17.11 PRIMARY OSTEOARTHRITIS OF RIGHT KNEE: Status: ACTIVE | Noted: 2021-01-01

## 2021-11-11 PROBLEM — M17.12 OSTEOARTHRITIS OF LEFT KNEE, UNSPECIFIED OSTEOARTHRITIS TYPE: Status: ACTIVE | Noted: 2021-01-01

## 2021-11-11 PROBLEM — E87.1 HYPONATREMIA: Status: ACTIVE | Noted: 2021-01-01

## 2021-11-11 PROBLEM — D62 ACUTE BLOOD LOSS ANEMIA: Status: ACTIVE | Noted: 2021-01-01

## 2021-11-11 PROBLEM — M10.9 GOUT: Status: ACTIVE | Noted: 2021-01-01

## 2021-11-11 NOTE — PLAN OF CARE
Physical Therapy Discharge Summary    Reason for therapy discharge:    Discharged to home with home therapy.    Progress towards therapy goal(s). See goals on Care Plan in [facility] electronic health record for goal details.  Goals partially met.  Barriers to achieving goals:   discharge from facility.    Therapy recommendation(s):    Continued therapy is recommended.  Rationale/Recommendations:  Patient continues to present with impairments in functional mobility and would benefit from TCU setting to address this, however, family would like to take him home with assist instead. Family was educated on how they can assist pt at home for transfers, bed mobility, ambulation, and LE TE. .    Arlin Mckeon, PT

## 2021-11-11 NOTE — PROGRESS NOTES
Care Management Discharge Note    Discharge Date: 11/11/2021       Discharge Disposition: discharge to home where he lives with son Nathaniel and daughter in law    Discharge Services:  Home PT and OT thru [facility] [phone]    Discharge DME:  Walker.  Family will purchase additional items per OT note.    Discharge Transportation: [employer] at 11:15    Private pay costs discussed: Not applicable    PAS Confirmation Code:  N/a   Patient/family educated on Medicare website which has current facility and service quality ratings:      Education Provided on the Discharge Plan: yes   Persons Notified of Discharge Plans: boone Alonso  Patient/Family in Agreement with the Plan:  yes    Handoff Referral Completed: Yes    Additional Information:  Home Health Care has accepted the home care referral and can begin services on Monday with an .  [facility] Will contact boone Alonso to arrange the first visit.         DOLLY Santoyo

## 2021-11-11 NOTE — DISCHARGE INSTRUCTIONS
Follow up on Nov 17th at 12 pm with [facility]-you have contact information and address.    Home Physical and Occupational Therapy thru [facility] [phone].  Home Health Care staff will provide an .  Staff will call Celeste to arrange the first visit on Monday.

## 2021-11-11 NOTE — PLAN OF CARE
Summary: Sepsis - weakness/confusion  DATE & TIME: 11/10/21 1306-7232  Cognitive Concerns/ Orientation: A&Ox2 disoriented to time and situation. Nauruan speaking only,daughter at bedside to interpret. Per management, is OK for family to stay the night. Must be the same person all day and then the next day someone else can come and stay the night. No switching out people. 1 person a day.  BEHAVIOR & AGGRESSION TOOL COLOR: Green  CIWA SCORE: N/A  ABNL VS/O2: VSS on RA  PAIN MANAGMENT: 7/10 knee pain after ambulation. PRN Tylenol x1 and ice packs.  DIET: Mod carb, family assists with ordering/eating food   BOWEL/BLADDER: Incontinent, no BM  ABNL LAB/BG: B, 213, 271  DRAIN/DEVICES: R PIV SL with int abx  TELEMETRY RHYTHM: NSR   SKIN: Surgical incision in L knee-dressing marked. 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  D/C DAY/GOALS/PLACE: [facility] scheduled for 11:15am tomorrow to home pending OT evaluation.  OTHER IMPORTANT INFO: L knee arthroplasty POD #8.

## 2021-11-11 NOTE — PLAN OF CARE
Discharge    Patient discharged to home via [facility]   Care plan note  Discharge instructions reviewed with family and patient via granddaughter. Questions answered. Med filled and given to daughter in law. Received a walker to use at home.     Listed belongings gathered and given to patient (including from security/pharmacy).yes  Care Plan and Patient education resolved: yes  Prescriptions if needed, hard copies sent with patient  yes  Medication Bin checked and emptied on discharge yes  SW/care coordinator/charge RN aware of discharge: yes

## 2021-11-11 NOTE — DISCHARGE SUMMARY
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  Patient was noted to be febrile and hypoxic on 11/7/2021 was therefore sent to the emergency department for further management.  On 11/6/2021 he reports that he felt chills and he had trouble sleeping.  He does endorse a somewhat mild cough as well.  He also reported that he had pain of his left foot.  That pain has resolved flow.  The TCU was found to be hypoxic to 80% and quite lethargic on 11/7/2021 so sent to the emergency department.      In ER, temp 102.8, , RR 22, WBC 13.4, Hgb 10.0, sodium 129.     Hospital Course   Admitted to medical floor, treated with IV Zosyn and switched to IV Ceftriaxone, and the Ceftin 500 mg bid at time of discharge.  WBC improved to 10.3, was seen by PT and discharged home and he will be staying with his son, and home PT and OT arranged. Hgb did drop to 7.9 but stable at time of discharge.  Sodium improved to 137.     Darwin Villalta MD  Pager: [phone]  Cell Phone:  [phone]       Significant Results and Procedures   As above    Pending Results   These results will be followed up by Dr. Villalta  Unresulted Labs Ordered in the Past 30 Days of this Admission     Date and Time Order Name  Status Description    11/7/2021  3:21 PM Blood Culture Peripheral Blood Preliminary     11/7/2021  3:21 PM Blood Culture Peripheral Blood Preliminary           Code Status   Full Code       Primary Care Physician   ORACIO GRANT    Physical Exam   Temp: 98.4  F (36.9  C) Temp src: Oral BP: 130/59 Pulse: 65   Resp: 15 SpO2: 98 % O2 Device: None (Room air)    Vitals:    11/07/21 1951   Weight: 70.7 kg (155 lb 13.8 oz)     Vital Signs with Ranges  Temp:  [97.6  F (36.4  C)-98.7  F (37.1  C)] 98.4  F (36.9  C)  Pulse:  [65-75] 65  Resp:  [15-16] 15  BP: (119-140)/(59-66) 130/59  SpO2:  [92 %-98 %] 98 %  I/O last 3 completed shifts:  In: 480 [P.O.:480]  Out: 350 [Urine:350]    Exam on discharge:   Lungs clear    Discharge Disposition   Discharged to home  Condition at discharge: Good    Consultations This Hospital Stay   PHARMACY TO DOSE VANCO  ORTHOPEDIC SURGERY IP CONSULT  PHYSICAL THERAPY ADULT IP CONSULT  SOCIAL WORK IP CONSULT  CARE MANAGEMENT / SOCIAL WORK IP CONSULT  OCCUPATIONAL THERAPY ADULT IP CONSULT    Time Spent on this Encounter   I spent a total of 35 minutes discharging this patient.     Discharge Orders      Home Care PT Referral for Hospital Discharge      Home Care OT Referral for Hospital Discharge      Reason for your hospital stay    Pneumonia     Follow-up and recommended labs and tests     Follow up with primary care provider, ORACIO GRANT, in 1 week and check CBC and BMP then, and follow-up with orthopedics as scheduled concerning knee replacement.     Activity    Your activity upon discharge: activity as tolerated, up with assist     MD face to face encounter    Documentation of Face to Face and Certification for Home Health Services    I certify that patient: Stephen Solorio is under my care and that I, or a nurse practitioner or physician's assistant working with me, had a face-to-face encounter that meets the physician face-to-face encounter requirements with this patient on:  11/11/2021.    This encounter with the patient was in whole, or in part, for the following medical condition, which is the primary reason for home health care: knee replacement, ongoing therapy.    I certify that, based on my findings, the following services are medically necessary home health services: Physical Therapy.    My clinical findings support the need for the above services because: Physical Therapy Services are needed to assess and treat the following functional impairments: needs help with ambulation.    Further, I certify that my clinical findings support that this patient is homebound (i.e. absences from home require considerable and taxing effort and are for medical reasons or Worship services or infrequently or of short duration when for other reasons) because: Leaving home is medically contraindicated for the following reason(s): ambulation difficult after knee surgery..    Based on the above findings. I certify that this patient is confined to the home and needs intermittent skilled nursing care, physical therapy and/or speech therapy.  The patient is under my care, and I have initiated the establishment of the plan of care.  This patient will be followed by a physician who will periodically review the plan of care.  Physician/Provider to provide follow up care: Juliet Srinivasan    Attending [facility] physician (the Medicare certified [place] provider): Darwin Villegas MD  Physician Signature: See electronic signature associated with these discharge orders.  Date: 11/11/2021     Discharge Instructions    Call Dr. Villegas if any medical questions at Cell Phone [phone].     Walker    DME Documentation:   Describe the reason for need to support medical necessity: TKA.     I, the undersigned, certify that the above prescribed supplies are medically necessary for this patient and is both reasonable and necessary in reference to accepted standards of medical and necessary in reference to  accepted standards of medical practice in the treatment of this patient's condition and is not prescribed as a convenience.     Diet    Follow this diet upon discharge: Orders Placed This Encounter      Diabetic diet     Discharge Medications   Current Discharge Medication List      START taking these medications    Details   cefuroxime (CEFTIN) 500 MG tablet Take 1 tablet (500 mg) by mouth every 12 hours for 5 days  Qty: 10 tablet, Refills: 0    Associated Diagnoses: Community acquired pneumonia, unspecified laterality         CONTINUE these medications which have CHANGED    Details   methotrexate 2.5 MG tablet To start in 1 week  Refills: 0    Associated Diagnoses: Rheumatoid arthritis, involving unspecified site, unspecified whether rheumatoid factor present (H)      polyethylene glycol (MIRALAX) 17 GM/Dose powder Take 17 g by mouth daily as needed for constipation  Qty: 510 g, Refills: 0    Associated Diagnoses: Constipation, unspecified constipation type         CONTINUE these medications which have NOT CHANGED    Details   acetaminophen (TYLENOL) 325 MG tablet Take 650 mg by mouth every 4 hours as needed for mild pain or fever      allopurinol (ZYLOPRIM) 300 MG tablet Take 300 mg by mouth daily      empagliflozin (JARDIANCE) 10 MG TABS tablet Take 10 mg by mouth daily (hold if bg<100)      latanoprost (XALATAN) 0.005 % ophthalmic solution Place 1 drop into both eyes daily      metFORMIN (GLUCOPHAGE) 500 MG tablet Take 500 mg by mouth 2 times daily (with meals)      predniSONE (DELTASONE) 5 MG tablet Take 5 mg by mouth 2 times daily      senna-docusate (SENOKOT-S/PERICOLACE) 8.6-50 MG tablet Take 1 tablet by mouth 2 times daily as needed for constipation         STOP taking these medications       enoxaparin ANTICOAGULANT (LOVENOX) 40 MG/0.4ML syringe Comments:   Reason for Stopping:         oxyCODONE (ROXICODONE) 5 MG tablet Comments:   Reason for Stopping:             Allergies   No Known Allergies  Data    Most Recent 3 CBC's:Recent Labs   Lab Test 11/10/21  0820 11/09/21  0827 11/08/21  0803   WBC 10.3 13.5* 11.2*   HGB 7.9* 7.6* 7.5*   * 103* 105*   * 491* 386      Most Recent 3 BMP's:  Recent Labs   Lab Test 11/11/21  0743 11/11/21  0147 11/10/21  2139 11/10/21  1219 11/10/21  0820 11/09/21  1313 11/09/21  0827 11/08/21  0817 11/08/21  0803   NA  --   --   --   --  137  --  130*  --  136   POTASSIUM  --   --   --   --  3.4  --  3.5  --  3.7   CHLORIDE  --   --   --   --  104  --  99  --  105   CO2  --   --   --   --  25  --  23  --  23   BUN  --   --   --   --  19  --  18  --  19   CR  --   --   --   --  0.67  --  0.82  --  0.87   ANIONGAP  --   --   --   --  8  --  8  --  8   BRADY  --   --   --   --  9.3  --  8.9  --  8.6   * 187* 193*   < > 165*  153*   < > 110*   < > 92    < > = values in this interval not displayed.     Most Recent 2 LFT's:  Recent Labs   Lab Test 11/07/21  1501 10/21/21  1549 10/08/21  0816 08/12/21  1125   AST 15 9   < > 21   ALT 20 15   < > 26   ALKPHOS 137  --   --  54   BILITOTAL 0.8  --   --  0.4    < > = values in this interval not displayed.     Most Recent INR's and Anticoagulation Dosing History:  Anticoagulation Dose History     Recent Dosing and Labs Latest Ref Rng & Units 2/11/2014    INR 0.86 - 1.14 1.16(H)        Most Recent 3 Troponin's:  Recent Labs   Lab Test 02/11/14  1720   TROPI <0.012     Most Recent Cholesterol Panel:  Recent Labs   Lab Test 08/05/21  0929   CHOL 172   LDL 94   HDL 44   TRIG 168*     Most Recent 6 Bacteria Isolates From Any Culture (See EPIC Reports for Culture Details):  Recent Labs   Lab Test 02/13/14  1440 02/13/14  1432 02/11/14  2230 02/11/14  1955 02/11/14  1840 02/11/14  1747   CULT No growth No growth On day 5, isolated in broth only: Propionibacterium acnes Susceptibility testing of Propionibacterium species is not done from this  source. Our antibiogram indicates that Propionibacterum species is susceptible  to penicillin  and cefotaxime and most are susceptible to clindamycin. Jessica Hernandez M.D., Medical Director*  No growth No growth No growth No Beta Streptococcus isolated     Most Recent TSH, T4 and A1c Labs:  Recent Labs   Lab Test 10/21/21  1549 08/05/21  0929   TSH  --  9.42*   T4  --  1.03   A1C 9.6* 5.5

## 2021-11-11 NOTE — PROGRESS NOTES
11/11/21 0800   Quick Adds   Type of Visit Initial Occupational Therapy Evaluation   Living Environment   People in home child(yoav), adult  (son, daughter-in-law, daughter and granddaughter)   Current Living Arrangements house   Home Accessibility stairs to enter home   Number of Stairs, Main Entrance 2   Stair Railings, Main Entrance railings safe and in good condition;railing on left side (ascending)   Transportation Anticipated family or friend will provide   Living Environment Comments Lives with son and daughter in law. Has been at [facility] after TKA. Son's house has 2 steps to enter, daughter saying she thinks they are building a ramp and getting a wheelchair. Tub shower, pt currently has a shower chair   Self-Care   Usual Activity Tolerance moderate   Current Activity Tolerance fair   Equipment Currently Used at Home cane, straight   Activity/Exercise/Self-Care Comment Per chart, pt independent with SEC at baseline, assist and shower chair for bathing- recent L TKA 11/1 at [facility], was discharged to [place] and now at [facility].    Disability/Function   Hearing Difficulty or Deaf no   Walking or Climbing Stairs Difficulty yes   Walking or Climbing Stairs ambulation difficulty, requires equipment;ambulation difficulty, assistance 1 person;stair climbing difficulty, requires equipment;stair climbing difficulty, assistance 1 person;transferring difficulty, requires equipment;transferring difficulty, assistance 1 person   Dressing/Bathing Difficulty yes   Dressing/Bathing bathing difficulty, requires equipment;bathing difficulty, assistance 1 person   Toileting issues no   Fall history within last six months no   General Information   Onset of Illness/Injury or Date of Surgery 11/07/21   Referring Physician Darwin Villegas MD   Additional Occupational Profile Info/Pertinent History of Current Problem 80 yo male admitted 11/7 from TCU, s/p R TKA  on 11/1. Found to have sepsis on admission, SALIMA mcdaniel  bursitis, PMH significant for RA and gout, DMII   Existing Precautions/Restrictions fall   Right Lower Extremity (Weight-bearing Status) weight-bearing as tolerated (WBAT)   Cognitive Status Examination   Follows Commands follows one-step commands;75-90% accuracy   Sensory   Sensory Quick Adds No deficits were identified   Pain Assessment   Patient Currently in Pain Yes, see Vital Sign flowsheet   Integumentary/Edema   Integumentary/Edema Comments Pt with arthritic hands, R hand 4th and 5th fingers in flexed posture    Posture   Posture forward head position;protracted shoulders   Range of Motion Comprehensive   Comment, General Range of Motion Not assessed due to joint pain, anticipate WFL   Strength Comprehensive (MMT)   Comment, General Manual Muscle Testing (MMT) Assessment Generalized weakness, not assessed due to joint pain   Muscle Tone Assessment   Muscle Tone Quick Adds No deficits were identified   Coordination   Upper Extremity Coordination No deficits were identified   Bed Mobility   Bed Mobility supine-sit   Supine-Sit [place] (Bed Mobility) contact guard   Assistive Device (Bed Mobility) bed rails   Transfers   Transfers sit-stand transfer   Sit-Stand Transfer   Sit-Stand [place] (Transfers) minimum assist (75% patient effort)   Assistive Device (Sit-Stand Transfers) walker, front-wheeled   Activities of Daily Living   BADL Assessment lower body dressing;toileting   Lower Body Dressing Assessment   [place] Level (Lower Body Dressing) moderate assist (50% patient effort)   Toileting   [place] Level (Toileting) minimum assist (75% patient effort)   Assistive Devices (Toileting) commode chair   Comment (Toileting) Not formally assessed, anticipate min A with transfer to commode   Clinical Impression   Criteria for Skilled Therapeutic Interventions Met (OT) yes;meets criteria;skilled treatment is necessary   OT Diagnosis Impaired independence with functional mobility and ADLs   OT  Problem List-Impairments impacting ADL activity tolerance impaired;mobility;range of motion (ROM);strength;pain   Assessment of Occupational Performance 3-5 Performance Deficits   Identified Performance Deficits functional mobility, dressing, bathing, toileting   Planned Therapy Interventions (OT) ADL retraining;transfer training   Clinical Decision Making Complexity (OT) moderate complexity   Therapy Frequency (OT) Daily   Anticipated Equipment Needs Upon Discharge (OT) tub bench;commode chair   Risk & Benefits of therapy have been explained evaluation/treatment results reviewed;care plan/treatment goals reviewed;risks/benefits reviewed;current/potential barriers reviewed;participants voiced agreement with care plan;participants included;patient;daughter   OT Discharge Planning    OT Discharge Recommendation (DC Rec) Transitional Care Facility;Home with assist;home with home care occupational therapy   OT Rationale for DC Rec Pt currently below baseline for functional mobility and ADL performance, recommend TCU for continued rehab to maximize independence and safety. However, pt admitted from TCU and had poor experience due to language barrier and visitor restrictions. Pt and family wishing for pt to return home. Anticipate pt safe to do so with 24 hr assist with mobility and ADLs, adaptive equipment including commode and tub transfer bench, and safe plan for entering home with 2 stairs (defer to PT- pt's daughter stating they may also be building a ramp and getting a wheelchair)   Total Evaluation Time (Minutes)   Total Evaluation Time (Minutes) 8

## 2021-11-11 NOTE — PLAN OF CARE
Occupational Therapy Discharge Summary    Reason for therapy discharge:    Discharged to home with home therapy.    Progress towards therapy goal(s). See goals on Care Plan in [facility] electronic health record for goal details.  Goals partially met.  Barriers to achieving goals:   discharge from facility.    Therapy recommendation(s):    Continued therapy is recommended.  Rationale/Recommendations:  Pt currently below baseline for functional mobility and ADL performance, recommend TCU for continued rehab to maximize independence and safety. However, pt admitted from TCU and had poor experience due to language barrier and visitor restrictions. Pt and family wishing for pt to return home. Anticipate pt safe to do so with 24 hr assist with mobility and ADLs, adaptive equipment including commode and tub transfer bench, and safe plan for entering home with 2 stairs (defer to PT- pt's daughter stating they may also be building a ramp and getting a wheelchair).

## 2021-11-11 NOTE — PLAN OF CARE
-Summary: Sepsis - weakness/confusion  DATE & TIME: 11/10/21 1900- 21 0730  Cognitive Concerns/ Orientation: A&Ox2 disoriented to time and situation. Maltese speaking only,daughter at bedside to interpret. Per management, is OK for family to stay the night. Must be the same person all day and then the next day someone else can come and stay the night. No switching out people. 1 person a day.  BEHAVIOR & AGGRESSION TOOL COLOR: Green  CIWA SCORE: N/A  ABNL VS/O2: VSS on RA  PAIN MANAGMENT: Denies pain, but ice pack given for intermittent sore L knee.  DIET: Mod carb, family assists with ordering/eating food   BOWEL/BLADDER: Incontinent at times, also voiding in urinal; no BM  ABNL LAB/BG: B and 187  DRAIN/DEVICES: R PIV SL with int abx  TELEMETRY RHYTHM: NSR   SKIN: Surgical incision in L knee-dressing marked. 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 BLE edema 1-2+ L>R.  R hand edema 3+, L hand edema 2+.  R elbow bursitis, ice pack helpful.  D/C DAY/GOALS/PLACE: Medivan scheduled for 11:15am tomorrow to home pending OT evaluation.  OTHER IMPORTANT INFO: L knee arthroplasty done on 21.

## 2021-11-12 NOTE — Clinical Note
REKHAI, focused on diet today as they had a number of questions on this and will follow up again in a few weeks.   Kimberli Sotomayor, HARPREET LD CDE

## 2021-11-12 NOTE — LETTER
"    11/12/2021         RE: Stephen Valerio  [address]  [facility] [address]        Dear Colleague,    Thank you for referring your patient, Stephen Valerio, to the [facility] [place]. Please see a copy of my visit note below.      Diabetes Self-Management Education & Support  Type of Service: Telephone Visit    Originating Location (Patient Location): Home  Distant Location (Provider Location): Home  Mode of Communication:  Telephone    Telephone Visit Start Time: 2:33pm  Telephone Visit End Time (telephone visit stop time): 3:33pm    How would patient like to obtain AVS? Adela      Presents for: Individual review    SUBJECTIVE/OBJECTIVE:  Presents for: Individual review  Accompanied by: Self,Spouse,   Focus of Visit: Monitoring,Healthy Eating,Taking Medication  Diabetes type: Type 2  Date of diagnosis: 3 years ago in [place] per chart  Disease course: Worsening  How confident are you filling out medical forms by yourself:: Not Assessed  Diabetes management related comments/concerns: Had knee surgery recently.   What should his levels be in the morning and after a meal? How many carbs to eat at each meal? Is yogurt okay and milk?   Other concerns:: Language barrier  Cultural Influences/Ethnic Background:   /     Diabetes Symptoms & Complications:  Weight trend: Decreasing  Complications assessed today?: No    Patient Problem List and Family Medical History reviewed for relevant medical history, current medical status, and diabetes risk factors.    Vitals:  There were no vitals taken for this visit.  Estimated body mass index is 21.74 kg/m  as calculated from the following:    Height as of 11/7/21: 1.803 m (5' 11\").    Weight as of 11/7/21: 70.7 kg (155 lb 13.8 oz).     Last 3 BP:   BP Readings from Last 3 Encounters:   11/11/21 130/59   11/05/21 130/70   11/04/21 127/78       History   Smoking Status     Former Smoker   Smokeless Tobacco     Never Used "       Labs:  Lab Results   Component Value Date    A1C 9.6 10/21/2021     Lab Results   Component Value Date     11/11/2021     11/10/2021     03/03/2014     Lab Results   Component Value Date    LDL 94 08/05/2021    LDL 82 04/12/2014     HDL Cholesterol   Date Value Ref Range Status   04/12/2014 41 >40 mg/dL Final     Direct Measure HDL   Date Value Ref Range Status   08/05/2021 44 >=40 mg/dL Final     Comment:     0-19 years:       Greater than or equal to 45 mg/dL   Low: Less than 40 mg/dL   Borderline low: 40-44 mg/dL     20 years and older:   Female: Greater than or equal to 50 mg/dL   Male:   Greater than or equal to 40 mg/dL        ]  GFR Estimate   Date Value Ref Range Status   11/10/2021 >90 >60 mL/min/1.73m2 Final     Comment:     As of July 11, 2021, eGFR is calculated by the CKD-EPI creatinine equation, without race adjustment. eGFR can be influenced by muscle mass, exercise, and diet. The reported eGFR is an estimation only and is only applicable if the renal function is stable.   04/24/2014 88.4 ml/min/1.73m2 Final     GFR Estimate If Black   Date Value Ref Range Status   03/03/2014 >90 >60 mL/min/1.7m2 Final     Lab Results   Component Value Date    CR 0.67 11/10/2021    CR 0.9 04/24/2014     No results found for: MICROALBUMIN    Healthy Eating:  Healthy Eating Assessed Today: Yes  Meal planning/habits: None    Diet recall as of today:  Breakfast today: protein bar and tea and then checked his BG and ate his regular breakfast which was ham (turkey), 2 eggs, and a tortilla (corn)  Lunch: chicken soup with rice and vegetables  Dinner: green salad and chicken and cucumbers  Snacks: fruit (only if he had vegetables at his previous meal) and usually 1/2 if it is a large fruit  Drinks milk, water, tea    Previous diet recall in endo note:  Breakfast- green tea, shake- put protein powder, strawberries, and some strawberry flavoring without sugar, milk 2%, granola bar  Lunch-   Dinner-  Cactus salad, beans w/o fat, pasta, 2 tortillas (corn)- 3pm  Snacks- Pineapple, pear   Late night- tea  Beverages- lots of water, stopped drinking juice (8oz)    Being Active:  Being Active Assessed Today: No    Monitoring:  Monitoring Assessed Today: Yes  Did patient bring glucose meter to appointment? : Yes  Blood glucose trend: Decreasing    230 mg/dL this morning after a light breakfast (protein bar and tea).             Taking Medications:  Diabetes Medication(s)     Biguanides       metFORMIN (GLUCOPHAGE) 500 MG tablet    Take 500 mg by mouth 2 times daily (with meals)     metFORMIN (GLUCOPHAGE) 500 MG tablet    Take 1 tablet (500 mg) by mouth 2 times daily (with meals)     Patient taking differently: Take 500 mg by mouth 2 times daily (with meals) Patient tapering up to final dose 1,000 mg BID. Currently taking 1,000 mg in AM and 500 in PM    Sodium-Glucose Co-Transporter 2 (SGLT2) Inhibitors       empagliflozin (JARDIANCE) 10 MG TABS tablet    Take 10 mg by mouth daily (hold if bg<100)     empagliflozin (JARDIANCE) 10 MG TABS tablet    Take 2 tablets (20 mg) by mouth daily      metformin resumed at endo visit last month.     Taking Medication Assessed Today: Yes  Current Treatments: Oral Medication (taken by mouth)  Problems taking diabetes medications regularly?: No  Diabetes medication side effects?: No    Problem Solving:  Problem Solving Assessed Today: No    Reducing Risks:  Reducing Risks Assessed Today: No    Healthy Coping:  Healthy Coping Assessed Today: Yes  Emotional response to diabetes: Ready to learn  Informal Support system:: Family  Stage of change: ACTION (Actively working towards change)  Patient Activation Measure Survey Score:  No flowsheet data found.    Diabetes knowledge and skills assessment:   Patient is knowledgeable in diabetes management concepts related to: Taking Medication and Healthy Coping  Patient needs further education on the following diabetes management concepts: Healthy  Eating, Being Active, Monitoring, Problem Solving and Reducing Risks  Based on learning assessment above, most appropriate setting for further diabetes education would be: Individual setting.      INTERVENTIONS:    Education provided today on:  AADE Self-Care Behaviors:  Healthy Eating: carbohydrate counting, consistency in amount, composition, and timing of food intake, portion control and label reading. Educated on checking the nutrition label to see how many carbs are in his yogurt and also his protein bar. Explained that some can be quite high in carbs. Educated on aiming for 60 grams of carb per meal and what a portion is for different foods he commonly eats such as beans, rice, corn, tortillas, fruit, milk.  If he is still hungry at meals and ate all his carbs, encouraged more protein or vegetables.   Monitoring: log and interpret results and individual blood glucose targets,. Educated on goal of  before meals and < 180 after meals (2 hours after) for his BG.     Opportunities for ongoing education and support in diabetes-self management were discussed.    Pt verbalized understanding of concepts discussed and recommendations provided today.       Education Materials Provided:  will mail plate planner in Irish and carb count guide in Irish      ASSESSMENT:  Stephen has reduced his intake of fruit and carbs since his meeting with the endo. Now he has just a small fruit or 1/2 fruit between meals and only if he had some vegetable at the previous meal. He is eating more vegetables as well.     They had a number of questions today on diet so we focused on those. Focused on keeping his carb intake consistent and a moderate amount at meals and then small amounts between meals. Praised him on eating more vegetables. He was recently in the hospital but it looks like his BG's have been improving with his higher dose of Metformin (increased at his endo visit).         Patient's most recent   Lab Results    Component Value Date    A1C 9.6 10/21/2021       PLAN  See Patient Instructions for co-developed, patient-stated behavior change goals.  Aim for 60 grams of carb per meal and 15 grams of carb for snacks.  Will mail Frisian diet with diabetes handouts.   Look at how many carbs are in the protein bar.   Follow up in 4 weeks scheduled to make sure they received the diet handouts and see if there are further questions/review BGs.   AVS printed and provided to patient today. See Follow-Up section for recommended follow-up.    HARPREET Townsend CDE    Time Spent: 60 minutes  Encounter Type: Individual    Any diabetes medication dose changes were made via the CDE Protocol and Collaborative Practice Agreement with the patient's referring provider. A copy of this encounter was shared with the provider.

## 2021-11-12 NOTE — TELEPHONE ENCOUNTER
[facility] looking for confirmation that you are willing to follow for home care services     Okay to leave detailed message    .Ishaan Vasquez RN

## 2021-11-12 NOTE — PROGRESS NOTES
"  Diabetes Self-Management Education & Support  Type of Service: Telephone Visit    Originating Location (Patient Location): Home  Distant Location (Provider Location): Home  Mode of Communication:  Telephone    Telephone Visit Start Time: 2:33pm  Telephone Visit End Time (telephone visit stop time): 3:33pm    How would patient like to obtain AVS? Adela      Presents for: Individual review    SUBJECTIVE/OBJECTIVE:  Presents for: Individual review  Accompanied by: Self,Spouse,   Focus of Visit: Monitoring,Healthy Eating,Taking Medication  Diabetes type: Type 2  Date of diagnosis: 3 years ago in [place] per chart  Disease course: Worsening  How confident are you filling out medical forms by yourself:: Not Assessed  Diabetes management related comments/concerns: Had knee surgery recently.   What should his levels be in the morning and after a meal? How many carbs to eat at each meal? Is yogurt okay and milk?   Other concerns:: Language barrier  Cultural Influences/Ethnic Background:   /     Diabetes Symptoms & Complications:  Weight trend: Decreasing  Complications assessed today?: No    Patient Problem List and Family Medical History reviewed for relevant medical history, current medical status, and diabetes risk factors.    Vitals:  There were no vitals taken for this visit.  Estimated body mass index is 21.74 kg/m  as calculated from the following:    Height as of 11/7/21: 1.803 m (5' 11\").    Weight as of 11/7/21: 70.7 kg (155 lb 13.8 oz).     Last 3 BP:   BP Readings from Last 3 Encounters:   11/11/21 130/59   11/05/21 130/70   11/04/21 127/78       History   Smoking Status     Former Smoker   Smokeless Tobacco     Never Used       Labs:  Lab Results   Component Value Date    A1C 9.6 10/21/2021     Lab Results   Component Value Date     11/11/2021     11/10/2021     03/03/2014     Lab Results   Component Value Date    LDL 94 08/05/2021    LDL 82 04/12/2014     HDL " Cholesterol   Date Value Ref Range Status   04/12/2014 41 >40 mg/dL Final     Direct Measure HDL   Date Value Ref Range Status   08/05/2021 44 >=40 mg/dL Final     Comment:     0-19 years:       Greater than or equal to 45 mg/dL   Low: Less than 40 mg/dL   Borderline low: 40-44 mg/dL     20 years and older:   Female: Greater than or equal to 50 mg/dL   Male:   Greater than or equal to 40 mg/dL        ]  GFR Estimate   Date Value Ref Range Status   11/10/2021 >90 >60 mL/min/1.73m2 Final     Comment:     As of July 11, 2021, eGFR is calculated by the CKD-EPI creatinine equation, without race adjustment. eGFR can be influenced by muscle mass, exercise, and diet. The reported eGFR is an estimation only and is only applicable if the renal function is stable.   04/24/2014 88.4 ml/min/1.73m2 Final     GFR Estimate If Black   Date Value Ref Range Status   03/03/2014 >90 >60 mL/min/1.7m2 Final     Lab Results   Component Value Date    CR 0.67 11/10/2021    CR 0.9 04/24/2014     No results found for: MICROALBUMIN    Healthy Eating:  Healthy Eating Assessed Today: Yes  Meal planning/habits: None    Diet recall as of today:  Breakfast today: protein bar and tea and then checked his BG and ate his regular breakfast which was ham (turkey), 2 eggs, and a tortilla (corn)  Lunch: chicken soup with rice and vegetables  Dinner: green salad and chicken and cucumbers  Snacks: fruit (only if he had vegetables at his previous meal) and usually 1/2 if it is a large fruit  Drinks milk, water, tea    Previous diet recall in endo note:  Breakfast- green tea, shake- put protein powder, strawberries, and some strawberry flavoring without sugar, milk 2%, granola bar  Lunch-   Dinner- Cactus salad, beans w/o fat, pasta, 2 tortillas (corn)- 3pm  Snacks- Pineapple, pear   Late night- tea  Beverages- lots of water, stopped drinking juice (8oz)    Being Active:  Being Active Assessed Today: No    Monitoring:  Monitoring Assessed Today: Yes  Did  patient bring glucose meter to appointment? : Yes  Blood glucose trend: Decreasing    230 mg/dL this morning after a light breakfast (protein bar and tea).             Taking Medications:  Diabetes Medication(s)     Biguanides       metFORMIN (GLUCOPHAGE) 500 MG tablet    Take 500 mg by mouth 2 times daily (with meals)     metFORMIN (GLUCOPHAGE) 500 MG tablet    Take 1 tablet (500 mg) by mouth 2 times daily (with meals)     Patient taking differently: Take 500 mg by mouth 2 times daily (with meals) Patient tapering up to final dose 1,000 mg BID. Currently taking 1,000 mg in AM and 500 in PM    Sodium-Glucose Co-Transporter 2 (SGLT2) Inhibitors       empagliflozin (JARDIANCE) 10 MG TABS tablet    Take 10 mg by mouth daily (hold if bg<100)     empagliflozin (JARDIANCE) 10 MG TABS tablet    Take 2 tablets (20 mg) by mouth daily      metformin resumed at endo visit last month.     Taking Medication Assessed Today: Yes  Current Treatments: Oral Medication (taken by mouth)  Problems taking diabetes medications regularly?: No  Diabetes medication side effects?: No    Problem Solving:  Problem Solving Assessed Today: No    Reducing Risks:  Reducing Risks Assessed Today: No    Healthy Coping:  Healthy Coping Assessed Today: Yes  Emotional response to diabetes: Ready to learn  Informal Support system:: Family  Stage of change: ACTION (Actively working towards change)  Patient Activation Measure Survey Score:  No flowsheet data found.    Diabetes knowledge and skills assessment:   Patient is knowledgeable in diabetes management concepts related to: Taking Medication and Healthy Coping  Patient needs further education on the following diabetes management concepts: Healthy Eating, Being Active, Monitoring, Problem Solving and Reducing Risks  Based on learning assessment above, most appropriate setting for further diabetes education would be: Individual setting.      INTERVENTIONS:    Education provided today on:  AADE Self-Care  Behaviors:  Healthy Eating: carbohydrate counting, consistency in amount, composition, and timing of food intake, portion control and label reading. Educated on checking the nutrition label to see how many carbs are in his yogurt and also his protein bar. Explained that some can be quite high in carbs. Educated on aiming for 60 grams of carb per meal and what a portion is for different foods he commonly eats such as beans, rice, corn, tortillas, fruit, milk.  If he is still hungry at meals and ate all his carbs, encouraged more protein or vegetables.   Monitoring: log and interpret results and individual blood glucose targets,. Educated on goal of  before meals and < 180 after meals (2 hours after) for his BG.     Opportunities for ongoing education and support in diabetes-self management were discussed.    Pt verbalized understanding of concepts discussed and recommendations provided today.       Education Materials Provided:  will mail plate planner in Trinidadian and carb count guide in Trinidadian      ASSESSMENT:  Stephen has reduced his intake of fruit and carbs since his meeting with the endo. Now he has just a small fruit or 1/2 fruit between meals and only if he had some vegetable at the previous meal. He is eating more vegetables as well.     They had a number of questions today on diet so we focused on those. Focused on keeping his carb intake consistent and a moderate amount at meals and then small amounts between meals. Praised him on eating more vegetables. He was recently in the hospital but it looks like his BG's have been improving with his higher dose of Metformin (increased at his endo visit).         Patient's most recent   Lab Results   Component Value Date    A1C 9.6 10/21/2021       PLAN  See Patient Instructions for co-developed, patient-stated behavior change goals.  Aim for 60 grams of carb per meal and 15 grams of carb for snacks.  Will mail Trinidadian diet with diabetes handouts.   Look at how  many carbs are in the protein bar.   Follow up in 4 weeks scheduled to make sure they received the diet handouts and see if there are further questions/review BGs.   AVS printed and provided to patient today. See Follow-Up section for recommended follow-up.    HARPREET Townsend CDE    Time Spent: 60 minutes  Encounter Type: Individual    Any diabetes medication dose changes were made via the CDE Protocol and Collaborative Practice Agreement with the patient's referring provider. A copy of this encounter was shared with the provider.

## 2021-11-17 NOTE — LETTER
11/17/2021         RE: Stephen Valerio  [address] [facility] [address]        Dear Colleague,    Thank you for referring your patient, Stephen Valerio, to the [facility] [place]. Please see a copy of my visit note below.    I was present with the resident during the history and exam.  I discussed the case with the resident and agree with the findings as documented in the assessment and plan.    CHIEF CONCERN:  2 weeks s/p left TKA    Interval history:    Pain improving. Patient is working with PT and making progress. Patient is here with family who feel he is making good progress.     PHYSICAL EXAM:    Left anterior knee incision is healing well without drainage. No erythema. Appropriate post-op edema. ROM from 5-95 degrees today with significant ever to achieve this.     IMAGING:  X-rays of left knee were removed showing appropriate alignment    ASSESSMENT:  2 weeks s/p left TKA -with well healing incision     PLAN:  F/u in 3 months with repeat x-rays of the left knee  -consideration of right TKA pending his overall recovery to this point    Caesar Duran MD PGY-4   Orthopaedic Surgery Resident   Pager [phone]

## 2021-11-17 NOTE — NURSING NOTE
Reason For Visit:   Chief Complaint   Patient presents with     Surgical Followup     2 wk post-op left knee athroplasty DOS 11/1/21 // wound check      Medication Refill     requesting prednisone refill        There were no vitals taken for this visit.    Pain Assessment  Patient Currently in Pain: Yes  0-10 Pain Scale: 5  Primary Pain Location: Knee (left knee)    Ramila Fernando ATC

## 2021-11-17 NOTE — PROGRESS NOTES
CHIEF CONCERN:  2 weeks s/p left TKA    Interval history:    Pain improving. Patient is working with PT and making progress. Patient is here with family who feel he is making good progress.     PHYSICAL EXAM:    Left anterior knee incision is healing well without drainage. No erythema. Appropriate post-op edema. ROM from 5-95 degrees today with significant ever to achieve this.     IMAGING:  X-rays of left knee were removed showing appropriate alignment    ASSESSMENT:  2 weeks s/p left TKA -with well healing incision     PLAN:  F/u in 3 months with repeat x-rays of the left knee  -consideration of right TKA pending his overall recovery to this point    Caesar Duran MD PGY-4   Orthopaedic Surgery Resident   Pager [phone]

## 2021-12-08 PROBLEM — U07.1 INFECTION DUE TO 2019 NOVEL CORONAVIRUS: Status: ACTIVE | Noted: 2021-01-01

## 2021-12-08 PROBLEM — R09.02 HYPOXIA: Status: ACTIVE | Noted: 2021-01-01

## 2021-12-08 NOTE — H&P
[facility]    History and Physical  Hospitalist       Date of Admission:  12/8/2021    Assessment & Plan      This is a 81-year-old male with history of diabetes mellitus type 2, rheumatoid arthritis, hyperlipidemia, gout, chronic back pain, chronic hyponatremia, who was recently admitted in the hospital from 11/07 to 11/11 for community-acquired pneumonia and acute hypoxic respiratory failure following his left total knee arthroplasty on 11/01/2021.  The patient is now coming to the ER with complaint of back pain, coughing, feeling fatigued and weak for the last couple of days.    ASSESSMENT AND PLAN:  1.  Acute hypoxic respiratory failure, likely secondary to COVID-19 pneumonia:  This is a 81-year-old male with COVID negative.  He has been vaccinated in [place], does not remember what vaccination.  He was recently tested negative COVID last month and was treated for pneumonia after his total knee arthroplasty.  He is now coming here with coughing, coughing up some phlegm, fatigue, tired, weakness, back pain.  Chest x-ray does show bibasilar airspace pulmonary opacity, left worse than right, worrisome for infection.  His COVID-19 is positive.  We will admit him.  He got a liter of fluid in the ED.  We will hold further fluids, keep him on IV dexamethasone 6 mg daily for 10 days, started on IV remdesivir, Lovenox 40 mg daily.  I will check his inflammatory markers.  If the D-dimer is significantly elevated, we will go ahead and do a CT chest.  Right leg has no tenderness or swelling.  He has some back pain.  For that, he needs further workup once his COVID is improved.  Incentive spirometry flutter keeping him on Combivent inhaler every 4 hours.  As he has flutter, RT to assess and treat.  He is on 9 liters of oxygen at this time.  If needed, we will switch him to high-flow nasal cannula as needed.  He is immunocompromised status given use of methotrexate for his rheumatoid arthritis.  2.   Diabetes mellitus type 2:  Medications include metformin and Jardiance.  Hold metformin.  Continue Jardiance.  Start him on Lantus 8 units every evening.  Keep him on sliding scale insulin for correction and hypoglycemia protocol.  3.  Rheumatoid arthritis:  He is on methotrexate 20 mg on Mondays.  We will hold methotrexate for that at this time.  Prednisone 5 mg b.i.d.  We will hold prednisone while he is on dexamethasone at this time.  4.  Gout:  On allopurinol.  I will continue with that.  5.  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  Ranges from 129-135 at this time.  He was given IV fluids in the ED.  We will hold further intravenous fluids and check his labs in the morning.  6.  Osteoarthritis of bilateral knees:  He is status post TKA of left knee 11/01/2021.  His left knee on examination looks good.  No sign of infection at this time.  7.  GI prophylaxis with Pepcid.  8.  DVT prophylaxis with Lovenox.    CODE STATUS:  Full code as per the patient and his family's wishes.    The case discussed with the ED physician and the nursing staff taking care of the patient.    Randal Ritchie MD    DVT Prophylaxis: Enoxaparin (Lovenox) SQ and Pneumatic Compression Devices  Code Status: Full Code    Disposition: Expected discharge in 2 days once stable    Randal Ritchie MD    Primary Care Physician   [facility]    Chief Complaint   Back pain, weakness and fatigued     History is obtained from the patient    History of Present Illness   Admitted: 12/08/2021    HISTORY OF PRESENT ILLNESS:  This is a 81-year-old male with history of diabetes mellitus type 2, rheumatoid arthritis, hyperlipidemia, gout, chronic back pain, chronic hyponatremia, who was recently admitted in the hospital from 11/07 to 11/11 for community-acquired pneumonia and acute hypoxic respiratory failure following his left total knee arthroplasty on 11/01/2021.  The patient is now coming to the ER with complaint of  back pain, coughing, feeling fatigued and weak for the last couple of days.    The patient is [place] speaking.  It is difficult to obtain much history from him.  Most of the history is obtained from using the patient's son as an .  He told me that for the last couple of days he has been coughing and feeling weak and tired and spending most of his time either on the bed or on the sofa during the day.  Not eating much.  He started having some nausea, feeling tired and fatigued and started having back pain, so he decided to come to the ER.  In the ER, the patient found to be hypoxic and saturating 88% to 83% on room air, slightly hypertensive on arrival as well.    At this time, the patient is denying any chest pain, abdominal pain, fever, chills, dysuria, hematuria, constipation, diarrhea.  He does feel nauseated, and vomited once.  He has been coughing up some phlegm as well.  His COVID test came back positive, and the Hospitalist Service was consulted to admit the patient.    Past Medical History    I have reviewed this patient's medical history and updated it with pertinent information if needed.   Past Medical History:   Diagnosis Date     Chronic back pain      Gout      Hyperlipidemia LDL goal <130 04/08/2014     Primary osteoarthritis of both knees      RA (rheumatoid arthritis) (H)        Past Surgical History   I have reviewed this patient's surgical history and updated it with pertinent information if needed.  Past Surgical History:   Procedure Laterality Date     ARTHROPLASTY KNEE Left 11/1/2021    Procedure: ARTHROPLASTY, LEFT KNEE, TOTAL;  Surgeon: Hermes Kurger MD;  Location: [facility] OR     IRRIGATION AND DEBRIDEMENT HAND, COMBINED  2/11/2014    Procedure: COMBINED IRRIGATION AND DEBRIDEMENT HAND;  I&D Left Wrist;  Surgeon: Randy Perrin MD;  Location:  OR       Prior to Admission Medications   Prior to Admission Medications   Prescriptions Last Dose Informant Patient Reported?  Taking?   acetaminophen (TYLENOL) 325 MG tablet 12/7/2021 at Unknown time  No Yes   Sig: Take 2 tablets (650 mg) by mouth every 4 hours as needed for other   alcohol swab prep pads  Daughter No No   Sig: Use to swab area of injection/olga as directed.   allopurinol (ZYLOPRIM) 300 MG tablet 12/7/2021 at Unknown time Daughter No Yes   Sig: Take 1 tablet (300 mg) by mouth daily   Patient taking differently: Take 300 mg by mouth every morning    blood glucose (ACCU-CHEK SOFTCLIX) lancing device  Daughter No No   Sig: Device to be used with lancets.   blood glucose (NO BRAND SPECIFIED) test strip  Daughter No No   Sig: Use to test blood sugar 1-2 times daily or as directed. Accu-Chek   blood glucose monitoring (ACCU-CHEK MULTICLIX) lancets  Daughter No No   Sig: Use to test blood sugar 1-2 times daily.   empagliflozin (JARDIANCE) 10 MG TABS tablet   Yes No   Sig: Take 2 tablets (20 mg) by mouth daily   empagliflozin (JARDIANCE) 10 MG TABS tablet   Yes No   Sig: Take 10 mg by mouth daily (hold if bg<100)   enoxaparin ANTICOAGULANT (LOVENOX) 40 MG/0.4ML syringe   No No   Sig: Inject 0.4 mLs (40 mg) Subcutaneous every 24 hours   latanoprost (XALATAN) 0.005 % ophthalmic solution  Daughter Yes No   Sig: Place 1 drop into both eyes daily   metFORMIN (GLUCOPHAGE) 500 MG tablet  Daughter No No   Sig: Take 1 tablet (500 mg) by mouth 2 times daily (with meals)   Patient taking differently: Take 500 mg by mouth 2 times daily (with meals) Patient tapering up to final dose 1,000 mg BID. Currently taking 1,000 mg in AM and 500 in PM   metFORMIN (GLUCOPHAGE) 500 MG tablet   Yes No   Sig: Take 500 mg by mouth 2 times daily (with meals)   methotrexate 2.5 MG tablet  Daughter No No   Sig: Take 8 tablets (20 mg) by mouth every 7 days   Patient taking differently: Take 20 mg by mouth every 7 days Monday   methotrexate 2.5 MG tablet   No No   Sig: To start in 1 week   oxyCODONE (ROXICODONE) 5 MG tablet   No No   Sig: Take 1 tablet (5 mg) by  mouth every 4 hours as needed   Patient not taking: Reported on 11/17/2021   polyethylene glycol (MIRALAX) 17 GM/Dose powder   No No   Sig: Take 17 g by mouth daily as needed for constipation   polyethylene glycol (MIRALAX) 17 g packet   No No   Sig: Take 17 g by mouth daily   predniSONE (DELTASONE) 5 MG tablet   Yes No   Sig: Take 1 tablet (5 mg) by mouth 2 times daily   senna-docusate (SENOKOT-S/PERICOLACE) 8.6-50 MG tablet   No No   Sig: Take 1 tablet by mouth 2 times daily   senna-docusate (SENOKOT-S/PERICOLACE) 8.6-50 MG tablet   Yes No   Sig: Take 1 tablet by mouth 2 times daily as needed for constipation      Facility-Administered Medications: None     Allergies   No Known Allergies    Social History   I have reviewed this patient's social history and updated it with pertinent information if needed. InocenteLISBETH Valerio  reports that he has quit smoking. He has never used smokeless tobacco. He reports that he does not drink alcohol and does not use drugs.    Family History   I have reviewed this patient's family history and updated it with pertinent information if needed.   Family History   Problem Relation Age of Onset     Diabetes Brother      Anesthesia Reaction No family hx of      Cardiovascular No family hx of      Deep Vein Thrombosis (DVT) No family hx of        Review of Systems   CONSTITUTIONAL:  positive for  fatigue, malaise and anorexia  EYES:  negative  HEENT:  negative  RESPIRATORY:  positive for  cough with sputum  CARDIOVASCULAR:  negative  GASTROINTESTINAL:  positive for nausea and vomiting  GENITOURINARY:  negative  INTEGUMENT/BREAST:  negative  HEMATOLOGIC/LYMPHATIC:  negative  ALLERGIC/IMMUNOLOGIC:  negative  ENDOCRINE:  negative  MUSCULOSKELETAL:  positive for  arthralgias and back pain  NEUROLOGICAL:  negative  BEHAVIOR/PSYCH:  negative    Physical Exam   Temp: 99  F (37.2  C)   BP: 126/54 Pulse: 84   Resp: 22 SpO2: (!) 89 % O2 Device: Oxymask Oxygen Delivery: 6 LPM  Vital Signs with  Ranges  Temp:  [99  F (37.2  C)] 99  F (37.2  C)  Pulse:  [82-98] 84  Resp:  [15-30] 22  BP: ()/(41-77) 126/54  SpO2:  [83 %-95 %] 89 %  165 lbs 0 oz    Constitutional: Fatiued, Lethargic but cooperative.  Eyes: Conjunctiva and pupils examined and normal.  HEENT: Moist mucous membranes, normal dentition.  Respiratory:diminished air entry bilaterally, crackles positive   Cardiovascular: Regular rate and rhythm, normal S1 and S2, and no murmur noted.  GI: Soft, non-distended, non-tender, normal bowel sounds.  Lymph/Hematologic: No anterior cervical or supraclavicular adenopathy.  Skin: No rashes, no cyanosis, no edema.  Musculoskeletal: No joint swelling, erythema or tenderness.  Neurologic: Cranial nerves 2-12 intact, normal strength and sensation.  Psychiatric: Alert, oriented to person and place but  Not to time    Data   Data reviewed today:  I personally reviewed the chest x-ray image(s) and agree with the note below   Recent Labs   Lab 12/08/21  1313 12/08/21  1220   WBC  --  9.0   HGB  --  10.9*   MCV  --  98   PLT  --  135*   *  --    POTASSIUM 4.2  --    CHLORIDE 101  --    CO2 22  --    BUN 40*  --    CR 1.23  --    ANIONGAP 6  --    BRADY 8.4*  --    *  --    ALBUMIN 2.4*  --    PROTTOTAL 6.4*  --    BILITOTAL 0.3  --    ALKPHOS 102  --    ALT 33  --    AST 36  --        Recent Results (from the past 24 hour(s))   XR Chest Port 1 View    Narrative    CHEST ONE VIEW PORTABLE  12/8/2021 2:34 PM     HISTORY: Shortness of breath.    COMPARISON: Chest x-ray on 11/7/2021      Impression    IMPRESSION: Single AP view of the chest was obtained.  Cardiomediastinal silhouette is within normal limits. Bibasilar  airspace pulmonary opacities, left worse than right, worrisome for  infection. No significant pleural effusion or pneumothorax.

## 2021-12-08 NOTE — ED TRIAGE NOTES
Right back to leg pain for 2 days, never had this before, no injury , no english speaks [place] , here with son, needs left knee replacement

## 2021-12-08 NOTE — ED PROVIDER NOTES
History     Chief Complaint:  Leg pain   No chief complaint on file.       HPI   Stephen Valerio is a 81 year old male who presents with multiple complaints.  He is primarily here because his leg hurts and his back hurts.  He tells me he has had leg pain for several days, and it shoots down the back of his leg.  Son points to part of his sacrum, and is concerned that there is a small mass in this area as well.  The patient also seems to been more confused lately and more fatigued not answering questions appropriately.  His symptoms seem to be constant, slowly getting worse, and his leg pain is worse when he walks around.    Allergies:  No Known Allergies     Medications:    acetaminophen (TYLENOL) 325 MG tablet  allopurinol (ZYLOPRIM) 300 MG tablet  alcohol swab prep pads  blood glucose (ACCU-CHEK SOFTCLIX) lancing device  blood glucose (NO BRAND SPECIFIED) test strip  blood glucose monitoring (ACCU-CHEK MULTICLIX) lancets  empagliflozin (JARDIANCE) 10 MG TABS tablet  empagliflozin (JARDIANCE) 10 MG TABS tablet  enoxaparin ANTICOAGULANT (LOVENOX) 40 MG/0.4ML syringe  latanoprost (XALATAN) 0.005 % ophthalmic solution  metFORMIN (GLUCOPHAGE) 500 MG tablet  metFORMIN (GLUCOPHAGE) 500 MG tablet  methotrexate 2.5 MG tablet  methotrexate 2.5 MG tablet  oxyCODONE (ROXICODONE) 5 MG tablet  polyethylene glycol (MIRALAX) 17 g packet  polyethylene glycol (MIRALAX) 17 GM/Dose powder  predniSONE (DELTASONE) 5 MG tablet  senna-docusate (SENOKOT-S/PERICOLACE) 8.6-50 MG tablet  senna-docusate (SENOKOT-S/PERICOLACE) 8.6-50 MG tablet        Past Medical History:    Past Medical History:   Diagnosis Date     Chronic back pain      Gout      Hyperlipidemia LDL goal <130 04/08/2014     Primary osteoarthritis of both knees      RA (rheumatoid arthritis) (H)        Patient Active Problem List    Diagnosis Date Noted     Hypoxia 12/08/2021     Priority: Medium     Infection due to 2019 novel coronavirus 12/08/2021     Priority:  Medium     Gout 11/11/2021     Priority: Medium     RA (rheumatoid arthritis) (H) 11/11/2021     Priority: Medium     DM type 2, Hgb A1C 9.6 on 10/21 11/11/2021     Priority: Medium     Osteoarthritis Left knee -- S/P TKA 11/1/21 11/11/2021     Priority: Medium     Hyponatremia 11/11/2021     Priority: Medium     Sepsis 2nd to Pneumonia (Temp 102.8, , WBC 13.4) 11/11/2021     Priority: Medium     Acute blood loss anemia 11/11/2021     Priority: Medium     Acute respiratory failure with hypoxia (H) 11/07/2021     Priority: Medium     Community acquired pneumonia, unspecified laterality 11/07/2021     Priority: Medium     Osteoarthritis of left knee, unspecified osteoarthritis type 11/01/2021     Priority: Medium     Type 2 diabetes mellitus with other specified complication, without long-term current use of insulin (H) 10/12/2021     Priority: Medium     Elevated fasting blood sugar 09/14/2021     Priority: Medium     Rheumatoid arthritis (H) 08/16/2021     Priority: Medium     Elevated serum creatinine 08/16/2021     Priority: Medium     Annual physical exam 08/05/2021     Priority: Medium     Need for hepatitis C screening test 08/05/2021     Priority: Medium     Hyperlipidemia LDL goal <130 04/08/2014     Priority: Medium     CARDIOVASCULAR SCREENING; LDL GOAL LESS THAN 130 03/26/2014     Priority: Medium     Esophageal reflux 02/20/2014     Priority: Medium     Acute gouty arthritis 02/19/2014     Priority: Medium     Physical deconditioning 02/18/2014     Priority: Medium     Anemia 02/18/2014     Priority: Medium     Hyponatremia 02/18/2014     Priority: Medium     Gout      Priority: Medium     Chronic back pain      Priority: Medium        Past Surgical History:    Past Surgical History:   Procedure Laterality Date     ARTHROPLASTY KNEE Left 11/1/2021    Procedure: ARTHROPLASTY, LEFT KNEE, TOTAL;  Surgeon: Hermes Kruger MD;  Location: [facility] OR     IRRIGATION AND DEBRIDEMENT HAND, COMBINED   2/11/2014    Procedure: COMBINED IRRIGATION AND DEBRIDEMENT HAND;  I&D Left Wrist;  Surgeon: Randy Perrin MD;  Location:  OR        Family History:    family history includes Diabetes in his brother.    Social History:   reports that he has quit smoking. He has never used smokeless tobacco. He reports that he does not drink alcohol and does not use drugs.    PCP: Juliet Srinivasan     Review of Systems   All other systems reviewed and are negative.        Physical Exam     Patient Vitals for the past 24 hrs:   BP Temp Pulse Resp SpO2 Weight   12/08/21 1415 126/54 -- 84 22 (!) 89 % --   12/08/21 1400 97/77 -- 82 30 91 % --   12/08/21 1345 114/58 -- 92 15 92 % --   12/08/21 1330 109/52 -- 85 16 95 % --   12/08/21 1315 116/53 -- 83 26 92 % --   12/08/21 1300 93/52 -- 85 19 91 % --   12/08/21 1245 98/50 -- 88 17 95 % --   12/08/21 1230 113/47 -- 92 30 (!) 83 % --   12/08/21 1130 (!) 89/41 -- 98 -- (!) 88 % --   12/08/21 1128 -- 99  F (37.2  C) -- 16 -- 74.8 kg (165 lb)        Physical Exam  Vitals: reviewed by me  General: Pt seen on [facility], pleasant, cooperative, and alert to conversation.  Sick appearing, believes it to be the year 1900, is confused, but answering questions with clear speech.  Eyes: Tracking well, clear conjunctiva BL  ENT: MMM, midline trachea.   Lungs: Tachypneic, short of breath, coughing occasionally.  CV: Rate as above  Abd: Soft, non tender, no guarding, no rebound. Non distended  MSK: no joint effusion.  No evidence of trauma  Skin: No rash  Neuro: Clear speech and no facial droop.  Does have a positive straight leg raise in the right side, bilateral lower extremities are with sensation intact light touch and 5 and 5 motor throughout.  Psych: Not RIS, no e/o AH/VH    Emergency Department Course     Imaging:  XR Chest Port 1 View   Preliminary Result   IMPRESSION: Single AP view of the chest was obtained.   Cardiomediastinal silhouette is within normal limits. Bibasilar   airspace  pulmonary opacities, left worse than right, worrisome for   infection. No significant pleural effusion or pneumothorax.      CT Head w/o Contrast    (Results Pending)        Laboratory:  Labs Ordered and Resulted from Time of ED Arrival to Time of ED Departure   COMPREHENSIVE METABOLIC PANEL - Abnormal       Result Value    Sodium 129 (*)     Potassium 4.2      Chloride 101      Carbon Dioxide (CO2) 22      Anion Gap 6      Urea Nitrogen 40 (*)     Creatinine 1.23      Calcium 8.4 (*)     Glucose 152 (*)     Alkaline Phosphatase 102      AST 36      ALT 33      Protein Total 6.4 (*)     Albumin 2.4 (*)     Bilirubin Total 0.3      GFR Estimate 55 (*)    COVID-19 VIRUS (CORONAVIRUS) BY PCR - Abnormal    SARS CoV2 PCR Positive (*)    CBC WITH PLATELETS AND DIFFERENTIAL - Abnormal    WBC Count 9.0      RBC Count 3.53 (*)     Hemoglobin 10.9 (*)     Hematocrit 34.6 (*)     MCV 98      MCH 30.9      MCHC 31.5      RDW 14.6      Platelet Count 135 (*)     % Neutrophils 94      % Lymphocytes 5      % Monocytes 0      % Eosinophils 0      % Basophils 0      % Immature Granulocytes 1      NRBCs per 100 WBC 0      Absolute Neutrophils 8.4 (*)     Absolute Lymphocytes 0.4 (*)     Absolute Monocytes 0.0      Absolute Eosinophils 0.0      Absolute Basophils 0.0      Absolute Immature Granulocytes 0.1      Absolute NRBCs 0.0     TROPONIN I - Normal    Troponin I High Sensitivity 6     LACTIC ACID WHOLE BLOOD - Normal    Lactic Acid 0.9     ROUTINE UA WITH MICROSCOPIC REFLEX TO CULTURE   ERYTHROCYTE SEDIMENTATION RATE AUTO   CRP INFLAMMATION   FERRITIN   LACTATE DEHYDROGENASE   CK TOTAL   D DIMER QUANTITATIVE      Interventions:  Medications   ondansetron (ZOFRAN) injection 4 mg (4 mg Intravenous Given 12/8/21 1225)   0.9% sodium chloride BOLUS (0 mLs Intravenous Stopped 12/8/21 1450)     Followed by   sodium chloride 0.9% infusion (has no administration in time range)   morphine (PF) injection 4 mg (4 mg Intravenous Given 12/8/21  1247)   dexamethasone PF (DECADRON) injection 6 mg (6 mg Intravenous Given 12/8/21 1450)        Emergency Department Course:  Past medical records, nursing notes, and vitals reviewed.  I performed an exam of the patient and obtained history, as documented above.    Impression & Plan      Medical Decision Making:  This is a 81-year-old male presents the emergency room with appears to be an advanced coronavirus infection.  He is requiring significant oxygenation here, and I do think this the cause of his confusion and fatigue.  He may also have some baseline sciatica as he has a positive straight leg raise in the right side as well, and the pain profile that fits with it, but his main issue seems to be Covid related.  He will receive Decadron here, oxygen support, and he will clearly need to be admitted to the hospital.  I spoke to Dr. Ritchie of the hospitalist team is kindly agreed accept care of the patient.  No indication for antibiotics at this time, will plan for careful monitoring and supportive care.      Critical Care time:  was 30 minutes for this patient excluding procedures.    Diagnosis:    ICD-10-CM    1. Infection due to 2019 novel coronavirus  U07.1    2. Hypoxia  R09.02         12/8/2021   Noe Jimenez*        Noe Jimenez MD  12/08/21 1717

## 2021-12-08 NOTE — H&P
Admitted: 12/08/2021    HISTORY OF PRESENT ILLNESS:  This is a 81-year-old male with history of diabetes mellitus type 2, rheumatoid arthritis, hyperlipidemia, gout, chronic back pain, chronic hyponatremia, who was recently admitted in the hospital from 11/07 to 11/11 for community-acquired pneumonia and acute hypoxic respiratory failure following his left total knee arthroplasty on 11/01/2021.  The patient is now coming to the ER with complaint of back pain, coughing, feeling fatigued and weak for the last couple of days.    The patient is Samoan speaking.  It is difficult to obtain much history from him.  Most of the history is obtained from using the patient's son as an .  He told me that for the last couple of days he has been coughing and feeling weak and tired and spending most of his time either on the bed or on the sofa during the day.  Not eating much.  He started having some nausea, feeling tired and fatigued and started having back pain, so he decided to come to the ER.  In the ER, the patient found to be hypoxic and saturating 88% to 83% on room air, slightly hypertensive on arrival as well.    At this time, the patient is denying any chest pain, abdominal pain, fever, chills, dysuria, hematuria, constipation, diarrhea.  He does feel nauseated, and vomited once.  He has been coughing up some phlegm as well.  His COVID test came back positive, and the Hospitalist Service was consulted to admit the patient.    ASSESSMENT AND PLAN:  1.  Acute hypoxic respiratory failure, likely secondary to COVID-19 pneumonia:  This is a 81-year-old male with COVID negative.  He has been vaccinated in [place], does not remember what vaccination.  He was recently tested negative COVID last month and was treated for pneumonia after his total knee arthroplasty.  He is now coming here with coughing, coughing up some phlegm, fatigue, tired, weakness, back pain.  Chest x-ray does show bibasilar airspace pulmonary  opacity, left worse than right, worrisome for infection.  His COVID-19 is positive.  We will admit him.  He got a liter of fluid in the ED.  We will hold further fluids, keep him on IV dexamethasone 6 mg daily for 10 days, started on IV remdesivir, Lovenox 40 mg daily.  I will check his inflammatory markers.  If the D-dimer is significantly elevated, we will go ahead and do a CT chest.  Right leg has no tenderness or swelling.  He has some back pain.  For that, he needs further workup once his COVID is improved.  Incentive spirometry flutter keeping him on Combivent inhaler every 4 hours.  As he has flutter, RT to assess and treat.  He is on 9 liters of oxygen at this time.  If needed, we will switch him to high-flow nasal cannula as needed.  He is immunocompromised status given use of methotrexate for his rheumatoid arthritis.  2.  Diabetes mellitus type 2:  Medications include metformin and Jardiance.  Hold metformin.  Continue Jardiance.  Start him on Lantus 8 units every evening.  Keep him on sliding scale insulin for correction and hypoglycemia protocol.  3.  Rheumatoid arthritis:  He is on methotrexate 20 mg on Mondays.  We will hold methotrexate for that at this time.  Prednisone 5 mg b.i.d.  We will hold prednisone while he is on dexamethasone at this time.  4.  Gout:  On allopurinol.  I will continue with that.  5.  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  Ranges from 129-135 at this time.  He was given IV fluids in the ED.  We will hold further intravenous fluids and check his labs in the morning.  6.  Osteoarthritis of bilateral knees:  He is status post TKA of left knee 11/01/2021.  His left knee on examination looks good.  No sign of infection at this time.  7.  GI prophylaxis with Pepcid.  8.  DVT prophylaxis with Lovenox.    CODE STATUS:  Full code as per the patient and his family's wishes.    The case discussed with the ED physician and the nursing staff  taking care of the patient.    Randal Ritchie MD        D: 2021   T: 2021   MT: [id number]    Name:     TANA GARZA  MRN:      [id number]        Account:     [id number]   :      1940           Admitted:    2021       Document: [id number]

## 2021-12-08 NOTE — ED NOTES
[facility]  ED Nurse Handoff Report    ED Chief complaint: No chief complaint on file.      ED Diagnosis:   Final diagnoses:   Infection due to 2019 novel coronavirus   Hypoxia       Code Status: not addressed by ED MD.    Allergies: No Known Allergies    Patient Story: Patient had total knee replacement in early November. Brought in by son for complaints of left low back pain that radiates into his leg.  Focused Assessment:  Patient complains of low back pain.  Patient is dyspneic and oxygen sats were low upon arrival. Patient has cough that started Sunday. Denies fevers. Complains of fatigue and a generalized sense of not feeling well. Patient tested positive for Covid in ED.    Treatments and/or interventions provided: Morphine, zofran, decadron, oxygen  Patient's response to treatments and/or interventions: Some relief of pain    To be done/followed up on inpatient unit:  Continue to monitor.    Does this patient have any cognitive concerns?: alert and oriented    Activity level - Baseline/Home:  Independent  Activity Level - Current:   Stand with Assist    Patient's Preferred language: Danish   Needed?: Yes    Isolation: COVID r/o and special precautions  Infection: COVID r/o and special precautions  Patient tested for COVID 19 prior to admission: YES  Bariatric?: No    Vital Signs:   Vitals:    12/08/21 1330 12/08/21 1345 12/08/21 1400 12/08/21 1415   BP: 109/52 114/58 97/77 126/54   Pulse: 85 92 82 84   Resp: 16 15 30 22   Temp:       SpO2: 95% 92% 91% (!) 89%   Weight:           Cardiac Rhythm:     Was the PSS-3 completed:   Yes  What interventions are required if any?  none             Family Comments: Son at bedside and updated on visitor policy  OBS brochure/video discussed/provided to patient/family: No              Name of person given brochure if not patient: N/A              Relationship to patient: N/A    For the majority of the shift this patient's behavior was Green.    Behavioral interventions performed were no.    ED NURSE PHONE NUMBER: [phone]

## 2021-12-08 NOTE — PHARMACY-ADMISSION MEDICATION HISTORY
Pharmacy Medication History  Admission medication history interview status for the 12/8/2021  admission is complete. See EPIC admission navigator for prior to admission medications     Location of Interview: Phone  Medication history sources: Patient's family/friend (daughter and aunt)    Significant changes made to the medication list:   Deleted: enox (done 11/30 per notes), latanoprost, oxycodone, senna/docusate   Changed: empagliflozin 20 mg every day to 10 mg every day per aunt, metformin 500 bid to 1000 mg bid    Additional medication history information:    Prednisone: aunt says patient is not taking but recently filled 11/29/2021 for 5 mg BID x21 days     Medication reconciliation completed by provider prior to medication history? Yes    Time spent in this activity: 20 minutes     Prior to Admission medications    Medication Sig Last Dose Taking? Auth Provider   acetaminophen (TYLENOL) 325 MG tablet Take 2 tablets (650 mg) by mouth every 4 hours as needed for other 12/7/2021 at Unknown time Yes Danielle Preston APRN CNS   empagliflozin (JARDIANCE) 10 MG TABS tablet Take 10 mg by mouth daily (hold if bg<100)  Yes Unknown, Entered By History   metFORMIN (GLUCOPHAGE) 500 MG tablet Take 1 tablet (500 mg) by mouth 2 times daily (with meals)  Patient taking differently: Take 1,000 mg by mouth 2 times daily (with meals)   Yes Lauryn Goodman MD   methotrexate 2.5 MG tablet Take 8 tablets (20 mg) by mouth every 7 days  Patient taking differently: Take 20 mg by mouth every 7 days Monday 12/6/2021 Yes Shayne Joe MD   alcohol swab prep pads Use to swab area of injection/olga as directed.   Lauryn Goodman MD   allopurinol (ZYLOPRIM) 300 MG tablet Take 1 tablet (300 mg) by mouth daily   Shayne Joe MD   blood glucose (ACCU-CHEK SOFTCLIX) lancing device Device to be used with lancets.   Lauryn Goodman MD   blood glucose (NO BRAND SPECIFIED) test strip Use to test blood sugar 1-2 times daily or as  directed. Accu-Chek   Lauryn Goodman MD   blood glucose monitoring (ACCU-CHEK MULTICLIX) lancets Use to test blood sugar 1-2 times daily.   Lauryn Goodman MD   predniSONE (DELTASONE) 5 MG tablet Take 1 tablet (5 mg) by mouth 2 times daily  Patient not taking: Reported on 12/8/2021 Not Taking at Unknown time  Chapo Tinajero MD       The information provided in this note is only as accurate as the sources available at the time of update(s)     Brittany Still, PharmD, BCCCP

## 2021-12-08 NOTE — PROGRESS NOTES
RECEIVING UNIT ED HANDOFF REVIEW    ED Nurse Handoff Report was reviewed by: Toya Cartwright RN on December 8, 2021 at 5:37 PM

## 2021-12-09 NOTE — PROGRESS NOTES
[facility]    Hospitalist Progress Note    Brief Summary:   This is a 81-year-old male with history of diabetes mellitus type 2, rheumatoid arthritis, hyperlipidemia, gout, chronic back pain, chronic hyponatremia, who was recently admitted in the hospital from 11/07 to 11/11 for community-acquired pneumonia and acute hypoxic respiratory failure following his left total knee arthroplasty on 11/01/2021.  The patient is now coming to the ER with complaint of back pain, coughing, feeling fatigued and weak for the last couple of days  PTA     Assessment & Plan       1.  Acute hypoxic respiratory failure, likely secondary to COVID-19 pneumonia:   Possible Bacterial Pneumonia    This is a 81-year-old male,  He has been vaccinated in [place], does not remember what vaccination.  He was recently tested negative COVID last month and was treated for pneumonia after his total knee arthroplasty.  He is now coming here with coughing, coughing up some phlegm, fatigue, tired, weakness, back pain.  Chest x-ray does show bibasilar airspace pulmonary opacity, left worse than right, worrisome for infection.  His COVID-19 is now positive.   He got a liter of fluid in the ED.  We will hold further fluids, started him on IV dexamethasone 6 mg daily for 10 days, started on IV remdesivir, Lovenox 40 mg daily.Inflammatory markers significantly elevated. . Incentive spirometry flutter keeping him on Combivent inhaler every 4 hours.  As he has flutter, RT to assess and treat.  He is on 9 liters of oxygen at this time.  If needed, we will switch him to high-flow nasal cannula as needed.  He is immunocompromised status given use of methotrexate for his rheumatoid arthritis.  Overall he is much better now, continue to wean his oxygen down.   His Procalcitonin elevated to 1.6, I will start him on IV Zosyn for now.     2.  Diabetes mellitus type 2:  Medications include metformin and Jardiance.  Hold metformin.  Continue  Jardiance.  Start him on Lantus 8 units every evening.  Keep him on sliding scale insulin for correction and hypoglycemia protocol.  BS reasonably  control at this time.     3.  Rheumatoid arthritis:  He is on methotrexate 20 mg on Mondays.  We will hold methotrexate for that at this time.  Prednisone 5 mg b.i.d.  We will hold prednisone while he is on dexamethasone at this time.    4.  Gout:  On allopurinol.  I will continue with that.    5.  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  Ranges from 129-135 at this time.  He was given IV fluids in the ED.  We will hold further intravenous fluids and sodium is now normal this morning.     6.  Osteoarthritis of bilateral knees:  He is status post TKA of left knee 11/01/2021.  His left knee on examination looks good.  No sign of infection at this time.    7.  GI prophylaxis with Pepcid.       DVT Prophylaxis: Enoxaparin (Lovenox) SQ  Code Status: Full Code    Disposition: Expected discharge in >2  days once stable .    Randal Ritchie MD  Text Page  (7am - 6pm)    Interval History   More awake and alert today, has reasonable appetite, no fever, chills, chest pain or SOB at this time.     -Data reviewed today: I reviewed all new labs and imaging results over the last 24 hours. I personally reviewed no images or EKG's today.    Physical Exam   Temp: (!) 96.6  F (35.9  C) Temp src: Oral BP: 122/59 Pulse: 87   Resp: 18 SpO2: 98 % O2 Device: High Flow Nasal Cannula (HFNC) Oxygen Delivery: 7 LPM  Vitals:    12/08/21 1128   Weight: 74.8 kg (165 lb)     Vital Signs with Ranges  Temp:  [96.6  F (35.9  C)-97.9  F (36.6  C)] 96.6  F (35.9  C)  Pulse:  [] 87  Resp:  [8-31] 18  BP: ()/(47-92) 122/59  FiO2 (%):  [78 %] 78 %  SpO2:  [89 %-100 %] 98 %  I/O last 3 completed shifts:  In: 120 [P.O.:120]  Out: 1100 [Urine:1100]    Constitutional: awake, alert, cooperative, no apparent distress, and appears stated age  Eyes: Lids and lashes  normal, pupils equal, round and reactive to light, extra ocular muscles intact, sclera clear, conjunctiva normal  Respiratory: No increased work of breathing, good air exchange, poasitive crackles but no wheezing  Cardiovascular: Normal apical impulse, regular rate and rhythm, normal S1 and S2, no S3 or S4, and no murmur noted  GI: No scars, normal bowel sounds, soft, non-distended, non-tender, no masses palpated, no hepatosplenomegally  Skin: no bruising or bleeding  Musculoskeletal: no lower extremity pitting edema present  Neurologic: no focal deficit.     Medications     - MEDICATION INSTRUCTIONS -         remdesivir  100 mg Intravenous Q24H    And     sodium chloride 0.9%  50 mL Intravenous Q24H     allopurinol  300 mg Oral QAM     dexamethasone  6 mg Intravenous Daily     empagliflozin  10 mg Oral Daily     enoxaparin ANTICOAGULANT  40 mg Subcutaneous Q24H     famotidine  20 mg Intravenous Q12H     insulin aspart  1-7 Units Subcutaneous TID AC     insulin aspart  1-5 Units Subcutaneous At Bedtime     insulin glargine  8 Units Subcutaneous At Bedtime     ipratropium-albuterol  2 puff Inhalation 4x Daily     latanoprost  1 drop Both Eyes At Bedtime     polyethylene glycol  17 g Oral Daily     senna-docusate  1 tablet Oral BID     sodium chloride (PF)  3 mL Intracatheter Q8H       Data   Recent Labs   Lab 12/09/21  0900 12/09/21  0735 12/09/21  0207 12/08/21  1847 12/08/21  1313 12/08/21  1220   WBC  --  11.0  --   --   --  9.0   HGB  --  10.5*  --   --   --  10.9*   MCV  --  97  --   --   --  98   PLT  --  201  --   --   --  135*   NA  --  136  --   --  129*  --    POTASSIUM  --  5.3  --   --  4.2  --    CHLORIDE  --  110*  --   --  101  --    CO2  --  17*  --   --  22  --    BUN  --  35*  --   --  40*  --    CR  --  0.85  --   --  1.23  --    ANIONGAP  --  9  --   --  6  --    BRADY  --  10.0  --   --  8.4*  --    * 162* 197*   < > 152*  --    ALBUMIN  --   --   --   --  2.4*  --    PROTTOTAL  --   --   --    --  6.4*  --    BILITOTAL  --   --   --   --  0.3  --    ALKPHOS  --   --   --   --  102  --    ALT  --   --   --   --  33  --    AST  --   --   --   --  36  --     < > = values in this interval not displayed.       Recent Results (from the past 24 hour(s))   XR Chest Port 1 View    Narrative    CHEST ONE VIEW PORTABLE  12/8/2021 2:34 PM     HISTORY: Shortness of breath.    COMPARISON: Chest x-ray on 11/7/2021      Impression    IMPRESSION: Single AP view of the chest was obtained.  Cardiomediastinal silhouette is within normal limits. Bibasilar  airspace pulmonary opacities, left worse than right, worrisome for  infection. No significant pleural effusion or pneumothorax.    DERRICK PRINGLE MD         SYSTEM ID:  [id number]   CT Head w/o Contrast    Narrative    EXAM: CT HEAD W/O CONTRAST  LOCATION: [facility]  DATE/TIME: 12/8/2021 5:38 PM    INDICATION: Confusion  COMPARISON: None.  TECHNIQUE: Routine CT Head without IV contrast. Multiplanar reformats. Dose reduction techniques were used.    FINDINGS:  INTRACRANIAL CONTENTS: No intracranial hemorrhage, extraaxial collection, or mass effect.  No CT evidence of acute infarct. Moderate presumed chronic small vessel ischemic changes. Moderate generalized volume loss. No hydrocephalus.     VISUALIZED ORBITS/SINUSES/MASTOIDS: No intraorbital abnormality. No paranasal sinus mucosal disease. No middle ear or mastoid effusion.    BONES/SOFT TISSUES: No acute abnormality.      Impression    IMPRESSION:  1.  No acute intracranial process.

## 2021-12-09 NOTE — PLAN OF CARE
COVID. Alert to self and place. Kiswahili speaking. VSS on HF NC. Ax1 w/GB and W. Mod CHO. , 154. K 5.3, MG 2.3, P 3.7. Tele NSR. Sitter at bedside. PIV SL. +flatus +BS -BM. Urinal @bedside. discharge pending pt improvement and weaning of oxygen.

## 2021-12-09 NOTE — PLAN OF CARE
Alert to person, appears to be confused. May be language barrier, however  used with no reply to questions. Covid pos, on 6l nc with sats in low to mid 90's. On continous O2 monitoring. VSS on 6L NC. PT removed PIV l, moderate bleeding noted, dressing in place and bleeding stopped. Tele. Assist of 1 with ambulation. Urinal at bedside.

## 2021-12-09 NOTE — PROGRESS NOTES
I am a student from [employer].  I am accessing this chart under the supervision of my clinical instructor or respiratory care services staff.

## 2021-12-09 NOTE — PLAN OF CARE
Pt is A&O x2 to person and place. Pt confused, impulsively setting off bed alarm to urinate frequently.  used for communication.VSS. O2 sats in mid/upper 80s on RA. Sats in low/mid 90s on 9L oxymask. New IV placed. Tele with NSR. Up with assist x1.

## 2021-12-10 NOTE — PLAN OF CARE
Pt is alert to self and place only. A x 1 GBW and uses a urinal at bedside. Vietnamese speaking. Impulsive at times and has a sitter at bedside. VSS on 50 L of 70% O2 through a HFNC. Pt sats at 95 - 96%. Tele: NSR. DAVALOS. Difficult to assess pain due to language barriers. PIV SL with intermittent infusions. Mod CHO diet,  at 2200 and 162 at 0200 am. Discharge pending improvement of clinical state and when pt is weaned off O2.

## 2021-12-10 NOTE — PLAN OF CARE
Alert to self and place only. Upper sorbian speaking. Sitter at bedside for impulsivity. VSS on high flow oxygen, RT called at 1800 to re-assess/wean as able. Continuous oxygen saturations 92-95% on 50 L at 78% O2. PRN Tyl given x1 for generalized aches. Difficult to assess pain. PIV SL with intermittent infusions. Up to bedside for bedside urinal. Tolerated Mod CHO diet, BG checks. Electrolyte rechecks in AM. Tele NSR. Up A1, recent L TKA.

## 2021-12-10 NOTE — PROGRESS NOTES
Pt resting with Bipap at 100%, RT managing. Soft restraints in place with sitter at bedside. PT placed on IMC status awaiting available bed. VSS.

## 2021-12-10 NOTE — PROGRESS NOTES
[facility]    Hospitalist Progress Note    Brief Summary:   This is a 81-year-old male with history of diabetes mellitus type 2, rheumatoid arthritis, hyperlipidemia, gout, chronic back pain, chronic hyponatremia, who was recently admitted in the hospital from 11/07 to 11/11 for community-acquired pneumonia and acute hypoxic respiratory failure following his left total knee arthroplasty on 11/01/2021.  The patient is now coming to the ER with complaint of back pain, coughing, feeling fatigued and weak for the last couple of days  PTA     Assessment & Plan       1.  Acute hypoxic respiratory failure, likely secondary to COVID-19 pneumonia:   Possible Bacterial Pneumonia   Acute hypoxic hypercapnic respiratory failure.   Acute Encephalopathy secondary to hypoxia and Hypercapnia.    This is a 81-year-old male,  He has been vaccinated in [place], does not remember what vaccination.  He was recently tested negative COVID last month and was treated for pneumonia after his total knee arthroplasty.  He is now coming here with coughing, coughing up some phlegm, fatigue, tired, weakness, back pain.  Chest x-ray does show bibasilar airspace pulmonary opacity, left worse than right, worrisome for infection.  His COVID-19 is now positive.   He got a liter of fluid in the ED.  We will hold further fluids, started him on IV dexamethasone 6 mg daily for 10 days, started on IV remdesivir, Lovenox 40 mg daily on admission. .Inflammatory markers significantly elevated. . Incentive spirometry flutter keeping him on Combivent inhaler every 4 hours.  As he has flutter, RT to assess and treat.  He was on 9 liters of oxygen at this time.  If needed, we will switch him to high-flow nasal cannula as needed.  He is immunocompromised status given use of methotrexate for his rheumatoid arthritis.  His oxygen requirement is increasing since yesterday afternoon and escalated to HFNC on 12/09 and now desaturating on   High  flow as well.  His Procalcitonin elevated to 1.6 on admission, He is on  IV Zosyn for now.     At this time, start him on BiPAP, to ABG/VGB, CT chest PE protocol.   Increase Dexamethasone to 20 mg daily for 5 days then 10 mg daily to complete 10 day course.   Xray chest. Transfer him to [facility] [place],   VBG reviewed shows hypercapnia and respiratory acidosis.   BiPAP initiated and his saturation is now improve  Encephalopathic at this time secondary to hypercapnia, use soft restrain for his safety.     2.  Diabetes mellitus type 2:  Medications include metformin and Jardiance.  Hold metformin.  Continue Jardiance.  Start him on Lantus 8 units every evening.  Keep him on sliding scale insulin for correction and hypoglycemia protocol.  BS reasonably  control at this time.     3.  Rheumatoid arthritis:  He is on methotrexate 20 mg on Mondays.  We will hold methotrexate for that at this time.  Prednisone 5 mg b.i.d.  We will hold prednisone while he is on dexamethasone at this time.    4.  Gout:  On allopurinol.  I will continue with that.    5.  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  Ranges from 129-135 at this time.  He was given IV fluids in the ED.  We will hold further intravenous fluids and sodium is now normal this morning.     6.  Osteoarthritis of bilateral knees:  He is status post TKA of left knee 11/01/2021.  His left knee on examination looks good.  No sign of infection at this time.    7.  GI prophylaxis with Pepcid.       DVT Prophylaxis: Enoxaparin (Lovenox) SQ  Code Status: Full Code    Disposition: Expected discharge in >2  days once stable .      Discussed with the patient rick Armstrong over the phone and updated him on patient status and current condition and all the questions answered.     Randal Ritchie MD  Text Page  (7am - 6pm)    Interval History   Call early this morning that patient is desaturating despite on High flow nasal cannula, he was saturating 96% overnight.    Saw him urgently this morning, patient was quite confused and sleepy and unable to follow, breathing comfortably but saturating in 80's at this time.     Reviewed with the nursing and RT staff at bedside.     -Data reviewed today: I reviewed all new labs and imaging results over the last 24 hours. I personally reviewed no images or EKG's today.    Physical Exam   Temp: (!) 95.7  F (35.4  C) Temp src: Axillary BP: (!) 126/98 Pulse: 73   Resp: 23 SpO2: 96 % O2 Device: BiPAP/CPAP Oxygen Delivery: 50 LPM  Vitals:    12/08/21 1128   Weight: 74.8 kg (165 lb)     Vital Signs with Ranges  Temp:  [95.7  F (35.4  C)-97.5  F (36.4  C)] 95.7  F (35.4  C)  Pulse:  [60-84] 73  Resp:  [18-23] 23  BP: (108-137)/(54-98) 126/98  FiO2 (%):  [60 %-100 %] 75 %  SpO2:  [81 %-100 %] 96 %  I/O last 3 completed shifts:  In: 460 [P.O.:460]  Out: 1925 [Urine:1925]    Constitutional: patient fatigued, drowsy, confused and not following command but remain comfortable.   Eyes: Lids and lashes normal, pupils equal, round and reactive to light, extra ocular muscles intact, sclera clear, conjunctiva normal  Respiratory: No increased work of breathing, diminished air entry bilaterally   Cardiovascular: Normal apical impulse, regular rate and rhythm, normal S1 and S2, no S3 or S4, and no murmur noted  GI: No scars, normal bowel sounds, soft, non-distended, non-tender, no masses palpated, no hepatosplenomegally  Skin: no bruising or bleeding  Musculoskeletal: no lower extremity pitting edema present  Neurologic: no focal deficit, moving all 4 limbs.     Medications     - MEDICATION INSTRUCTIONS -       - MEDICATION INSTRUCTIONS -       - MEDICATION INSTRUCTIONS -         remdesivir  100 mg Intravenous Q24H    And     sodium chloride 0.9%  50 mL Intravenous Q24H     allopurinol  300 mg Oral QAM     dexamethasone (DECADRON) intermittent infusion  20 mg Intravenous Daily     empagliflozin  10 mg Oral Daily     enoxaparin ANTICOAGULANT  40 mg  Subcutaneous Q24H     famotidine  20 mg Intravenous Q12H     insulin aspart  1-7 Units Subcutaneous TID AC     insulin aspart  1-5 Units Subcutaneous At Bedtime     insulin glargine  8 Units Subcutaneous At Bedtime     ipratropium-albuterol  2 puff Inhalation 4x Daily     latanoprost  1 drop Both Eyes At Bedtime     piperacillin-tazobactam  3.375 g Intravenous Q6H     polyethylene glycol  17 g Oral Daily     senna-docusate  1 tablet Oral BID     sodium chloride (PF)  3 mL Intracatheter Q8H     sodium chloride (PF)  3 mL Intracatheter Q8H       Data   Recent Labs   Lab 12/10/21  0951 12/10/21  0805 12/10/21  0216 12/09/21  0900 12/09/21  0735 12/08/21  1847 12/08/21  1313 12/08/21  1220   0000   WBC 6.9  --   --   --  11.0  --   --  9.0  --    HGB 11.0*  --   --   --  10.5*  --   --  10.9*  --    MCV 99  --   --   --  97  --   --  98  --      --   --   --  201  --   --  135*  --      --   --   --  136  --  129*  --   --    POTASSIUM 4.5  --   --   --  5.3  --  4.2  --   --    CHLORIDE 106  --   --   --  110*  --  101  --   --    CO2 24  --   --   --  17*  --  22  --   --    BUN 32*  --   --   --  35*  --  40*  --   --    CR 0.90  --   --   --  0.85  --  1.23  --   --    ANIONGAP 7  --   --   --  9  --  6  --   --    BRADY 10.2*  --   --   --  10.0  --  8.4*  --   --    * 163* 162*   < > 162*   < > 152*  --    < >   ALBUMIN  --   --   --   --   --   --  2.4*  --   --    PROTTOTAL  --   --   --   --   --   --  6.4*  --   --    BILITOTAL  --   --   --   --   --   --  0.3  --   --    ALKPHOS  --   --   --   --   --   --  102  --   --    ALT  --   --   --   --   --   --  33  --   --    AST  --   --   --   --   --   --  36  --   --     < > = values in this interval not displayed.       Recent Results (from the past 24 hour(s))   XR Chest Port 1 View    Narrative    XR CHEST PORT 1 VIEW 12/10/2021 10:10 AM    HISTORY: SOB    COMPARISON: 12/8/2021        Impression    IMPRESSION: Left perihilar and basilar  infiltrate has slightly  improved. No pleural effusion or pneumothorax. Normal heart size.    CYNDIE ROBINS MD         SYSTEM ID:  [id number]

## 2021-12-10 NOTE — PROGRESS NOTES
Pt remains on 50L 70% HFNC. FIo2 weaned down to 70% from 80%.SPo2 95%. BS diminished. Will continue to monitor.    Preet Scott, RT

## 2021-12-10 NOTE — PROGRESS NOTES
RT called to room as patient's saturation levels were in the mid-to-low 80's. Patient was on Heated-high flow nasal cannula 50L, 80%. Patient appeared to be agitated and was moving his hands around. Unsure if good pleth was observed via the pulse oximeter. Patient had his arms restrained and was placed on BiPAP S/T 12/6, 100%. Patient responded well with SpO2 100%. FiO2 weaned to 90%. Lab angie arterial blood gas. RT to continue to follow patient.

## 2021-12-10 NOTE — PROGRESS NOTES
Pt transferred to [facility] [place] [facility] bed 333 report given to Pauline SANDOVAL

## 2021-12-11 NOTE — PLAN OF CARE
Pt with COVID pneumonia on BiPAP, [place] speaking,  intermittently confused and restless. Pulls off tubings and mask at times, incontinent and incontinent. Tele SR. Denies pain. Ax1 gait belt and walker, NPO, discharge pending clinical  improvement and O2 needs

## 2021-12-11 NOTE — PROGRESS NOTES
[facility]    Hospitalist Progress Note    Brief Summary:   This is a 81-year-old male with history of diabetes mellitus type 2, rheumatoid arthritis, hyperlipidemia, gout, chronic back pain, chronic hyponatremia, who was recently admitted in the hospital from 11/07 to 11/11 for community-acquired pneumonia and acute hypoxic respiratory failure following his left total knee arthroplasty on 11/01/2021.  The patient is now coming to the ER with complaint of back pain, coughing, feeling fatigued and weak for the last couple of days  PTA     Assessment & Plan       1.  Acute hypoxic respiratory failure, likely secondary to COVID-19 pneumonia:   Possible Bacterial Pneumonia   Acute hypoxic hypercapnic respiratory failure.   Acute Encephalopathy secondary to hypoxia and Hypercapnia.    This is a 81-year-old male,  He has been vaccinated in [place], does not remember what vaccination.  He was recently tested negative COVID last month and was treated for pneumonia after his total knee arthroplasty.  He is now coming here with coughing, coughing up some phlegm, fatigue, tired, weakness, back pain.  Chest x-ray does show bibasilar airspace pulmonary opacity, left worse than right, worrisome for infection.  His COVID-19 is now positive.   He got a liter of fluid in the ED.  We will hold further fluids, started him on IV dexamethasone 6 mg daily for 10 days, started on IV remdesivir, Lovenox 40 mg daily on admission. .Inflammatory markers significantly elevated. . Incentive spirometry flutter keeping him on Combivent inhaler every 4 hours.  As he has flutter, RT to assess and treat.  He was on 9 liters of oxygen at this time.  If needed, we will switch him to high-flow nasal cannula as needed.  He is immunocompromised status given use of methotrexate for his rheumatoid arthritis.  His oxygen requirement is increasing since admission, and escalated to HFNC on 12/09 and was desaturating on   High flow as  well on 12/10, and started on BiPAP with hypercapnia.    His Procalcitonin elevated to 1.6 on admission, He is on  IV Zosyn for now.     ABG reviewed on 12/11  7.44/37/88 with 75% FiO2, hypercapnia now resolved, more awake and alert now.   Wean him off the BiPAP and start on HFNC and continue weaning as well.   CT chest PE protocol done on 12/10 negative for PE but does shows moderate to severe bilateral infiltrate.     Increase Dexamethasone to 20 mg daily for 5 days then 10 mg daily to complete 10 day course on 12/10 because of   Increasing oxygen requirement.    Xray chest. Transfer him to [facility] [place],   Encephalopathic secondary to hypoxia and hypercapnia is now improving. Need  To orient him daily.      2.  Diabetes mellitus type 2:  Medications include metformin and Jardiance.  Hold metformin.  Continue Jardiance.  Start him on Lantus 8 units every evening.  Keep him on sliding scale insulin for correction and hypoglycemia protocol.  BS reasonably  control at this time, will increase Lantuss to 12 units at night today     3.  Rheumatoid arthritis:  He is on methotrexate 20 mg on Mondays.  We will hold methotrexate for that at this time.  Prednisone 5 mg b.i.d.  We will hold prednisone while he is on dexamethasone at this time.    4.  Gout:  On allopurinol.  I will continue with that.    5.  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  Ranges from 129-135 at this time.  He was given IV fluids in the ED.  We will hold further intravenous fluids and sodium is now normal at this time.     6.  Osteoarthritis of bilateral knees:  He is status post TKA of left knee 11/01/2021.  His left knee on examination looks good.  No sign of infection at this time. complaining of left knee pain will use tylenol for pain.      7.  GI prophylaxis with Pepcid.       DVT Prophylaxis: Enoxaparin (Lovenox) SQ  Code Status: Full Code    Disposition: Expected discharge in >2  days once stable .      Discussed  with the patient son Nathaniel over the phone and updated him on patient status and current condition and all the questions answered.     Randal Ritchie MD  Text Page  (7am - 6pm)    Interval History   Patient is more awake and alert today, following command, use  this morning, complaining of left knee pain, no fever, chills, nausea or vomiting, do feel hungry and want to eat.     Tolerated the BiPAP since yesterday now wean down to HFNC this morning.    No other significant event overnight.     -Data reviewed today: I reviewed all new labs and imaging results over the last 24 hours. I personally reviewed no images or EKG's today.    Physical Exam   Temp: 97.5  F (36.4  C) Temp src: Axillary BP: (!) 155/83 Pulse: 57   Resp: 15 SpO2: 100 % O2 Device: High Flow Nasal Cannula (HFNC) Oxygen Delivery: 50 LPM  Vitals:    12/08/21 1128   Weight: 74.8 kg (165 lb)     Vital Signs with Ranges  Temp:  [97.4  F (36.3  C)-98.1  F (36.7  C)] 97.5  F (36.4  C)  Pulse:  [42-82] 57  Resp:  [12-29] 15  BP: (126-155)/(60-98) 155/83  FiO2 (%):  [75 %-100 %] 100 %  SpO2:  [91 %-100 %] 100 %  I/O last 3 completed shifts:  In: -   Out: 1900 [Urine:1900]    Constitutional: awake, alert and oriented to self only at this time. .   Eyes: Lids and lashes normal, pupils equal, round and reactive to light, extra ocular muscles intact, sclera clear, conjunctiva normal  Respiratory: No increased work of breathing, diminished air entry bilaterally   Cardiovascular: Normal apical impulse, regular rate and rhythm, normal S1 and S2, no S3 or S4, and no murmur noted  GI: No scars, normal bowel sounds, soft, non-distended, non-tender, no masses palpated, no hepatosplenomegally  Skin: no bruising or bleeding  Musculoskeletal: no lower extremity pitting edema present  Neurologic: no focal deficit, moving all 4 limbs.     Medications     - MEDICATION INSTRUCTIONS -       - MEDICATION INSTRUCTIONS -       - MEDICATION INSTRUCTIONS -          remdesivir  100 mg Intravenous Q24H    And     sodium chloride 0.9%  50 mL Intravenous Q24H     allopurinol  300 mg Oral QAM     dexamethasone (DECADRON) intermittent infusion  20 mg Intravenous Daily     [START ON 12/15/2021] dexamethasone  10 mg Intravenous Q24H     empagliflozin  10 mg Oral Daily     enoxaparin ANTICOAGULANT  40 mg Subcutaneous Q24H     famotidine  20 mg Intravenous Q12H     insulin aspart  1-7 Units Subcutaneous TID AC     insulin aspart  1-5 Units Subcutaneous At Bedtime     insulin glargine  8 Units Subcutaneous At Bedtime     ipratropium-albuterol  2 puff Inhalation 4x Daily     latanoprost  1 drop Both Eyes At Bedtime     piperacillin-tazobactam  3.375 g Intravenous Q6H     polyethylene glycol  17 g Oral Daily     senna-docusate  1 tablet Oral BID     sodium chloride (PF)  3 mL Intracatheter Q8H     sodium chloride (PF)  3 mL Intracatheter Q8H       Data   Recent Labs   Lab 12/11/21  0528 12/11/21  0523 12/10/21  2120 12/10/21  1602 12/10/21  1340 12/10/21  0951 12/09/21  0900 12/09/21  0735 12/08/21  1847 12/08/21  1313   WBC 2.6*  --   --   --   --  6.9  --  11.0  --   --    HGB 10.0*  --   --   --   --  11.0*  --  10.5*  --   --    MCV 99  --   --   --   --  99  --  97  --   --      --   --   --   --  282  --  201  --   --    NA  --  139  --   --   --  137  --  136  --  129*   POTASSIUM  --  4.5  --   --   --  4.5  --  5.3  --  4.2   CHLORIDE  --  110*  --   --   --  106  --  110*  --  101   CO2  --  23  --   --   --  24  --  17*  --  22   BUN  --  36*  --   --   --  32*  --  35*  --  40*   CR  --  0.79  --   --   --  0.90  --  0.85  --  1.23   ANIONGAP  --  6  --   --   --  7  --  9  --  6   BRADY  --  9.6  --   --   --  10.2*  --  10.0  --  8.4*   GLC  --  190* 198* 140*   < > 164*   < > 162*   < > 152*   ALBUMIN  --  2.3*  --   --   --   --   --   --   --  2.4*   PROTTOTAL  --   --   --   --   --   --   --   --   --  6.4*   BILITOTAL  --   --   --   --   --   --   --   --   --   0.3   ALKPHOS  --   --   --   --   --   --   --   --   --  102   ALT  --   --   --   --   --   --   --   --   --  33   AST  --   --   --   --   --   --   --   --   --  36    < > = values in this interval not displayed.       Recent Results (from the past 24 hour(s))   CT Chest Pulmonary Embolism w Contrast    Narrative    EXAM: CT CHEST PULMONARY EMBOLISM W CONTRAST  LOCATION: [facility]  DATE/TIME: 12/10/2021 4:42 PM    INDICATION: Chest pain. PE suspected, high prob.  COMPARISON: None.  TECHNIQUE: CT chest pulmonary angiogram during arterial phase injection of IV contrast. Multiplanar reformats and MIP reconstructions were performed. Dose reduction techniques were used.   CONTRAST: 64 mL [id number]    FINDINGS:  ANGIOGRAM CHEST: Pulmonary arteries are normal caliber and negative for pulmonary emboli. Thoracic aorta is negative for dissection. No CT evidence of right heart strain.    LUNGS AND PLEURA: Moderate to severe infiltrates, including ground-glass and consolidative changes.    MEDIASTINUM/AXILLAE: A few minimally prominent lymph nodes are noted, likely reactive in the setting. Moderately atherosclerotic nonaneurysmal aorta.    CORONARY ARTERY CALCIFICATION: None.    UPPER ABDOMEN: Cholelithiasis without cholecystitis.    MUSCULOSKELETAL: No frankly destructive bony lesions.      Impression    IMPRESSION:  1.  No pulmonary embolism demonstrated.  2.  Moderate to severe bilateral infiltrates.

## 2021-12-11 NOTE — PLAN OF CARE
Pt arrived on unit @ 1200 from gen surg. Indonesian speaking only, hard to assess orientation but seems disoriented to situation. Pt in soft wrist restraints on arrival, discontinued and sitter at bedside. Pt calm/cooperative. On BiPAP 75% FiO2, VSS ex bradycardic at times. TELE SR/SB. D-dimer elevated, CT chest w/ contrast completed, no PE. LS dim. Able to reposition self in bed. Using urinal, voiding adequately. Remains NPO due to Bipap, frequent oral cares. Plan to continue current treatment plan, wean Bipap as able. Discharge pending.

## 2021-12-11 NOTE — PROVIDER NOTIFICATION
MD Notification    Notified Person: MD    Notified Person Name: Erma    Notification Date/Time: 12/11/21 1700    Notification Interaction: web page    Purpose of Notification: FYI pt's HR has been dropping to 40's, as low as 35 once while pt is sleeping, does not sustain. Let me know if we should do anything, possible apnea?    Orders Received: Per MD Remdesivir can cause this, ok as long as not sustained but let MD know if sustained and may need to discontinue Remdes.    Comments:

## 2021-12-12 NOTE — CONSULTS
[facility]  Gastroenterology Consultation         Stephen Valerio  [address]  [facility] [address]  81 year old male    Admission Date/Time: 12/8/2021  Primary Care Provider: Juliet Srinivasan  Referring / Attending Physician: Dr. Ritchie    We were asked to see the patient in consultation by Dr. Ritchie for evaluation of acute GI bleed.      CC: Melena    HPI:  Stephen Valerio is a 81 year old male who was admitted due to significant respiratory failure COVID-19 pneumonia.  Patient is in respiratory isolation patient is requiring BiPAP support patient has hypoxic hypercapnic respiratory failure patient is also alert encephalopathic due to hypoxia and metabolic causes.  Patient has history of type 2 diabetes rheumatoid arthritis hyperlipidemia gout chronic back pain chronic hyponatremia who was recently hospitalized.  Patient also had total knee arthroplasty on November 1.  Patient had worsening of his coughing and back pain fatigue during that patient was admitted.  Patient is significantly uncomfortable.  Patient had one episode of black tarry stool this morning patient altered respiratory compromise due to that patient had RRT called on him this morning patient is now laying comfortably patient is still struggling with respiratory symptoms patient is fairly labile.  Patient's hemoglobin is reasonably stable.  Most of the history was obtained from reviewing chart and hospitalist team.    ROS: A comprehensive ten point review of systems was negative aside from those in mentioned in the HPI.      PAST MED HX:  I have reviewed this patient's medical history and updated it with pertinent information if needed.   Past Medical History:   Diagnosis Date     Chronic back pain      Gout      Hyperlipidemia LDL goal <130 04/08/2014     Primary osteoarthritis of both knees      RA (rheumatoid arthritis) (H)        MEDICATIONS:   Prior to Admission Medications   Prescriptions Last Dose  Informant Patient Reported? Taking?   acetaminophen (TYLENOL) 325 MG tablet 12/7/2021 at Unknown time  No Yes   Sig: Take 2 tablets (650 mg) by mouth every 4 hours as needed for other   alcohol swab prep pads  Daughter No No   Sig: Use to swab area of injection/olga as directed.   allopurinol (ZYLOPRIM) 300 MG tablet  Daughter No No   Sig: Take 1 tablet (300 mg) by mouth daily   blood glucose (ACCU-CHEK SOFTCLIX) lancing device  Daughter No No   Sig: Device to be used with lancets.   blood glucose (NO BRAND SPECIFIED) test strip  Daughter No No   Sig: Use to test blood sugar 1-2 times daily or as directed. Accu-Chek   blood glucose monitoring (ACCU-CHEK MULTICLIX) lancets  Daughter No No   Sig: Use to test blood sugar 1-2 times daily.   empagliflozin (JARDIANCE) 10 MG TABS tablet   Yes Yes   Sig: Take 10 mg by mouth daily (hold if bg<100)   metFORMIN (GLUCOPHAGE) 500 MG tablet  Daughter No Yes   Sig: Take 1 tablet (500 mg) by mouth 2 times daily (with meals)   Patient taking differently: Take 1,000 mg by mouth 2 times daily (with meals)    methotrexate 2.5 MG tablet 12/6/2021 Daughter No Yes   Sig: Take 8 tablets (20 mg) by mouth every 7 days   Patient taking differently: Take 20 mg by mouth every 7 days Monday   predniSONE (DELTASONE) 5 MG tablet Not Taking at Unknown time  Yes No   Sig: Take 1 tablet (5 mg) by mouth 2 times daily   Patient not taking: Reported on 12/8/2021      Facility-Administered Medications: None       ALLERGIES: No Known Allergies    SOCIAL HISTORY:  Social History     Tobacco Use     Smoking status: Former Smoker     Smokeless tobacco: Never Used   Substance Use Topics     Alcohol use: No     Drug use: No       FAMILY HISTORY:  Family History   Problem Relation Age of Onset     Diabetes Brother      Anesthesia Reaction No family hx of      Cardiovascular No family hx of      Deep Vein Thrombosis (DVT) No family hx of        PHYSICAL EXAM:   General awake confused on BiPAP  Vital Signs with  Ranges  Temp: 98.2  F (36.8  C) Temp src: Axillary BP: 127/74 Pulse: 114   Resp: 25 SpO2: 90 % O2 Device: BiPAP/CPAP    I/O last 3 completed shifts:  In: -   Out: 475 [Urine:475]    Constitutional: healthy, alert and no distress   Cardiovascular: negative, PMI normal. No lifts, heaves, or thrills. RRR. No murmurs, clicks gallops or rub  Respiratory: negative, Percussion normal. Good diaphragmatic excursion. Lungs clear  Neck: Neck supple. No adenopathy. Thyroid symmetric, normal size,, Carotids without bruits.  Abdomen: Abdomen soft, non-tender. BS normal. No masses, organomegaly  SKIN: no suspicious lesions or rashes          ADDITIONAL COMMENTS:   I reviewed the patient's new clinical lab test results.   Recent Labs   Lab Test 12/16/21  0604 12/15/21  0551 12/14/21  0610 02/12/14  0636 02/11/14  1720   WBC 1.6* 3.0* 5.6   < > 7.9   HGB 12.2* 12.8* 12.3*   < > 11.3*   MCV 97 99 99   < > 98   PLT 57* 79* 139*   < > 259   INR  --   --   --   --  1.16*    < > = values in this interval not displayed.     Recent Labs   Lab Test 12/16/21  0604 12/15/21  0551 12/14/21  0610   POTASSIUM 3.6 3.5 3.9   CHLORIDE 117* 112* 112*   CO2 27 26 27   BUN 38* 42* 37*   ANIONGAP 3 8 7     Recent Labs   Lab Test 12/13/21  1132 12/11/21  0523 12/08/21  1450 12/08/21  1313 11/07/21  1756 11/07/21  1501 10/21/21  1549 10/08/21  0816 09/28/21  1735 08/12/21  1125   ALBUMIN 3.0* 2.3*  --  2.4*  --  2.4* 3.2*   < > 3.1* 3.8   BILITOTAL  --   --   --  0.3  --  0.8  --   --   --  0.4   ALT  --   --   --  33  --  20 15   < >  --  26   AST  --   --   --  36  --  15 9   < >  --  21   PROTEIN  --   --  20 *  --  Negative  --   --   --  Negative  --     < > = values in this interval not displayed.       I reviewed the patient's new imaging results.        CONSULTATION ASSESSMENT AND PLAN:    Active Problems:  Very pleasant 81-year-old  gentleman with complicated recent past history with respiratory failure Covid pneumonia requiring  significant respiratory support recent left total knee arthroplasty currently on variety of medications for respiratory support requiring BiPAP with episode of black stools this morning patient's hemoglobin is stable so far patient is on isolation due to Covid.  At this point I will recommend to continue on current supportive care continue to monitor respiratory status if patient needs patient can be transfused packed red blood cell continue on IV Protonix serial hemoglobins GI will continue to follow along closely at this point patient will be substantially high risk for any sedation or endoscopic procedure.  Risk-benefit plan discussed in detail.  Thank you very much for letting me participate in his care.            Lyla Arroyo MD, [facility]  [facility].  Office: [phone]  Cell :     Encephlopathy  COVID 19 positive  Dark stool, Hb Stable  Continue on supportive care  I/V Protonix  Serial Hb    Lyla Arroyo MD FACP  [facility]

## 2021-12-12 NOTE — PLAN OF CARE
Oriented to self only. Polish speaking, using Jabbr. Sitter at bedside as pt is confused and pulling off O2 intermittently. Off Bipap this am, currently on HFNC 80% FiO2 and 45L. Bradycardic at times, a few times dropped to 40's while sleeping but not sustained-MD notified, see note. TELE NSR/SB. Tylenol given x1 for L knee pain (previous TKA site). Good PO fluid intake, appetite fair. Sliding scale insulin given per orders. Incont of urine at times. No BM, miralax and senna given. Discharge pending ability to wean O2.

## 2021-12-12 NOTE — CODE/RAPID RESPONSE
[facility]    House VAN RRT Note  12/12/2021   Time Called: 0415    RRT called for: Hypoxia    Assessment & Plan     Acute hypoxic respiratory failure 2/2 COVID 19 PNA.  Large, dark stool, concern for GIB.  Acute encephalopathy 2/2 hypoxia, critical illness, ?steroids.  - Upon arrival, pt sitting upright in bed, awake, alert, in no overt respiratory distress, on 100% HFNC with O2 sats 90%.  Nursing notes pt has been progressively agitated overnight, had received haldol prior with no obvious results noted.  Pt had been placed on Bipap earlier due to hypoxia; however, pt had removed by time of arrival.  Nursing also notes pt now with large dark stool concerning for GIB.        INTERVENTIONS:  - Will trial one time dose morphine 2 mg IV now  - Will order 80 mg IV protonix now and place pt on protonix 40 mg IV BID; will discontinue pepcid at this time  - Will place hold on prophylactic lovenox at this time  - Stat CBC, T&S  - Will consult GI, Dr. Arroyo  - Will make pt NPO until seen by GI  - Bedside attendant remains at pt's bedside  - 2-point soft restraints     At the end of the RRT pt remains hemodynamically stable, on HFNC    Discussed with and defer further cares to nursing and hospitalist; also updated NDMT regarding pt's clinical status, notes available should pt further decompensate    Interval History     Stephen Valerio is a 81 year old male who was admitted on 12/8/2021 for back pain, cough, fatigue, weakness.    Medical history significant for: DMII, RA, HLP, gout, chronic back pain, chronic hyponatremia    Code Status: Full Code    Allergies   No Known Allergies    Physical Exam   Vital Signs with Ranges:  Temp:  [94.7  F (34.8  C)-97.6  F (36.4  C)] 94.7  F (34.8  C)  Pulse:  [54-83] 65  Resp:  [14-28] 23  BP: ()/() 128/74  FiO2 (%):  [75 %-100 %] 100 %  SpO2:  [81 %-100 %] 88 %  I/O last 3 completed shifts:  In: 720 [P.O.:720]  Out: 2350  [Urine:1790]    Constitutional: Pt sitting upright in bed, awake, alert, in no overt respiratory distress, restless  Neck: No upper airway wheezes or stridor noted  Pulmonary: In no overt respiratory distress, clear to auscultation bilaterally, diminished BLL, no crackles or wheezes noted  Cardiovascular: Regular rate and rhythm, normal S1S2, no murmur, rub or gallop noted  GI: Flat  Skin/Integumen: Warm, dry, pink  Neuro: Awake, alert, Setswana speaking, moving all extremities, no obvious focal neuro deficit  Psych:  Restless  Extremities: No peripheral edema noted      Time Spent on this Encounter   I spent 15 minutes on the unit/floor managing the care of Stephen Valerio. Over 50% of my time was spent counseling the patient and/or coordinating care regarding services listed in this note.    JASMIN Nolen [facility]

## 2021-12-12 NOTE — PROGRESS NOTES
Hospitalist service cross-cover note:     Paged regarding sinus bradycardia as low as 34, patient asymptomatic.  Will hold remdesivir which can be associated with bradycardia.  No other AV luke blocking agents noted. COVID infection itself my also be associated with bradycardia. Continue telemetry. Call RRT if becomes symptomatic or changes from sinus rhythm.     ADDENDUM: Paged subsequently for agitation while on BiPAP.  Reportedly was desatting to the 70s on high flow nasal cannula and thus switched to BiPAP, this improved his oxygenation, though he has subsequently been restless and pulling at his mask.  Remains confused, though this has been an ongoing issue.  Ordered haloperidol 2 mg IV as needed.    Luca Ramírez MD   Hospitalist  [phone]

## 2021-12-12 NOTE — PROVIDER NOTIFICATION
2327 hours Pt having frequent low HR below 40s, lowest 34, provider appraised, plan hold Remdesivir, keep monitoring, page if symptomatic    0325 hrs pt increasingly becoming agitated and restless, does not communicate back via jaber hence writer unable to assess needs, does not  tolerate BiPAP/HFNC at this time, provider paged, haldol ordered, call RRT if pt worsens     0420 hours patient more agitated, confused and restless now  despite haldol, unredirectable, pulling off mask, cords and tubings  RRT paged, morphine administered, had x1 large dark loose stool, GI consult,occult blood, ABO type and screen ordered    0515 hours pt laying in bed, beginning to calm down now, sitter at bed side

## 2021-12-12 NOTE — PROGRESS NOTES
[facility]    Hospitalist Progress Note    Brief Summary:   This is a 81-year-old male with history of diabetes mellitus type 2, rheumatoid arthritis, hyperlipidemia, gout, chronic back pain, chronic hyponatremia, who was recently admitted in the hospital from 11/07 to 11/11 for community-acquired pneumonia and acute hypoxic respiratory failure following his left total knee arthroplasty on 11/01/2021.  The patient is now coming to the ER with complaint of back pain, coughing, feeling fatigued and weak for the last couple of days  PTA     Assessment & Plan       1.  Acute hypoxic respiratory failure, likely secondary to COVID-19 pneumonia:   Possible Bacterial Pneumonia   Acute hypoxic hypercapnic respiratory failure.   Acute Encephalopathy secondary to hypoxia and Hypercapnia.    This is a 81-year-old male,  He has been vaccinated in [place], does not remember what vaccination.  He was recently tested negative COVID last month and was treated for pneumonia after his total knee arthroplasty.  He is now coming here with coughing, coughing up some phlegm, fatigue, tired, weakness, back pain.  Chest x-ray does show bibasilar airspace pulmonary opacity, left worse than right, worrisome for infection.  His COVID-19 is now positive.   He got a liter of fluid in the ED.  We will hold further fluids, started him on IV dexamethasone 6 mg daily for 10 days, started on IV remdesivir, Lovenox 40 mg daily on admission. .Inflammatory markers significantly elevated. . Incentive spirometry flutter keeping him on Combivent inhaler every 4 hours.  As he has flutter, RT to assess and treat.  He was on 9 liters of oxygen at this time.  If needed, we will switch him to high-flow nasal cannula as needed.  He is immunocompromised status given use of methotrexate for his rheumatoid arthritis.  His oxygen requirement is increasing since admission, and escalated to HFNC on 12/09 and was desaturating on   High flow as  well on 12/10, and started on BiPAP with hypercapnia.    His Procalcitonin elevated to 1.6 on admission, He is on  IV Zosyn for now.     ABG reviewed on 12/11  7.44/37/88 with 75% FiO2, hypercapnia now resolved, more awake and alert now.   Wean him off the BiPAP and start on HFNC and continue weaning as well.   CT chest PE protocol done on 12/10 negative for PE but does shows moderate to severe bilateral infiltrate.     Increase Dexamethasone to 20 mg daily for 5 days then 10 mg daily to complete 10 day course on 12/10 because of   Increasing oxygen requirement.    Xray chest. Transfer him to [facility],   Encephalopathic secondary to hypoxia and hypercapnia is now improving.   Lovenox held this morning by the house NP for large black stool, Hb and vital remain stable, recheck Hemoglobin today and if remain stable will restart Lovenox from tomorrow.     2.  Diabetes mellitus type 2:  Medications include metformin and Jardiance.  Hold metformin.  Continue Jardiance.  Start him on Lantus 8 units every evening.  Keep him on sliding scale insulin for correction and hypoglycemia protocol.  BS reasonably  control at this time, will increase Lantuss to 12 units at night today     3.  Rheumatoid arthritis:  He is on methotrexate 20 mg on Mondays.  We will hold methotrexate for that at this time.  Prednisone 5 mg b.i.d.  We will hold prednisone while he is on dexamethasone at this time.    4.  Gout:  On allopurinol.  I will continue with that.    5.  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  Ranges from 129-135 at this time.  He was given IV fluids in the ED.  We will hold further intravenous fluids and sodium is now normal at this time.     6.  Osteoarthritis of bilateral knees:  He is status post TKA of left knee 11/01/2021.  His left knee on examination looks good.  No sign of infection at this time. complaining of left knee pain will use tylenol for pain.      7.  GI prophylaxis with  Protonix.     8. Dark BM concern for GI bleeding.   There was concern for possible GI bleeding given large dark stool, Patient remain hemodynamically stable and this morning Hemoglobin stable around 10.   Agree with holding Lovenox today and given IV Protonix 80 mg bolus, for now I will keep him on oral Protonix 40 bid now, recheck Hb every 8 hrs to see the trend, if Hb remain stable, restart Lovenox from tomorrow.   Not a candidate for any EGD at this time, given respiratory failure and COVID positive status, discussed with GI as well. Restart on oral diet now.          DVT Prophylaxis: Enoxaparin (Lovenox) subcutaneous(holding today)  Code Status: Full Code    Disposition: Expected discharge in >2  days once stable .    Discussed with the nursing staff taking care of the patient today     Randal Ritchie MD  Text Page  (7am - 6pm)    Interval History   Noted RRT early this morning because of large dark BM, patient remain hemodynamically stable.   At time of my visit, sleeping comfortably, woke up easily , slightly confused, denies any SOB.     No other significant event overnight.       -Data reviewed today: I reviewed all new labs and imaging results over the last 24 hours. I personally reviewed no images or EKG's today.    Physical Exam   Temp: 97.5  F (36.4  C) Temp src: Axillary BP: (!) 155/84 Pulse: 76   Resp: (!) 34 SpO2: 98 % O2 Device: High Flow Nasal Cannula (HFNC) Oxygen Delivery: 55 LPM  Vitals:    12/08/21 1128   Weight: 74.8 kg (165 lb)     Vital Signs with Ranges  Temp:  [94.7  F (34.8  C)-97.6  F (36.4  C)] 97.5  F (36.4  C)  Pulse:  [42-83] 76  Resp:  [13-34] 34  BP: ()/() 155/84  FiO2 (%):  [80 %-100 %] 100 %  SpO2:  [81 %-100 %] 98 %  I/O last 3 completed shifts:  In: 960 [P.O.:960]  Out: 2240 [Urine:2240]    Constitutional: awake, alert and oriented to self only at this time. .   Eyes: Lids and lashes normal, pupils equal, round and reactive to light, extra ocular muscles intact,  sclera clear, conjunctiva normal  Respiratory: No increased work of breathing, diminished air entry bilaterally   Cardiovascular: Normal apical impulse, regular rate and rhythm, normal S1 and S2, no S3 or S4, and no murmur noted  GI: No scars, normal bowel sounds, soft, non-distended, non-tender, no masses palpated, no hepatosplenomegally  Skin: no bruising or bleeding  Musculoskeletal: no lower extremity pitting edema present  Neurologic: no focal deficit, moving all 4 limbs.     Medications     - MEDICATION INSTRUCTIONS -       - MEDICATION INSTRUCTIONS -       - MEDICATION INSTRUCTIONS -         [Held by provider] remdesivir  100 mg Intravenous Q24H    And     [Held by provider] sodium chloride 0.9%  50 mL Intravenous Q24H     allopurinol  300 mg Oral QAM     dexamethasone (DECADRON) intermittent infusion  20 mg Intravenous Daily     [START ON 12/15/2021] dexamethasone  10 mg Intravenous Q24H     empagliflozin  10 mg Oral Daily     [Held by provider] enoxaparin ANTICOAGULANT  40 mg Subcutaneous Q24H     insulin aspart  1-7 Units Subcutaneous TID AC     insulin aspart  1-5 Units Subcutaneous At Bedtime     insulin glargine  12 Units Subcutaneous At Bedtime     ipratropium-albuterol  2 puff Inhalation 4x Daily     latanoprost  1 drop Both Eyes At Bedtime     pantoprazole  40 mg Oral BID AC     piperacillin-tazobactam  3.375 g Intravenous Q6H     polyethylene glycol  17 g Oral Daily     senna-docusate  1 tablet Oral BID     sodium chloride (PF)  3 mL Intracatheter Q8H     sodium chloride (PF)  3 mL Intracatheter Q8H       Data   Recent Labs   Lab 12/12/21  1108 12/12/21  0816 12/12/21  0548 12/11/21  2151 12/11/21  0528 12/11/21  0523 12/10/21  1340 12/10/21  0951 12/08/21  1847 12/08/21  1313   WBC  --   --  2.2*  --  2.6*  --   --  6.9   < >  --    HGB  --   --  10.0*  --  10.0*  --   --  11.0*   < >  --    MCV  --   --  101*  --  99  --   --  99   < >  --    PLT  --   --  206  --  234  --   --  282   < >  --     NA  --   --  141  --   --  139  --  137   < > 129*   POTASSIUM  --   --  3.9  --   --  4.5  --  4.5   < > 4.2   CHLORIDE  --   --  111*  --   --  110*  --  106   < > 101   CO2  --   --  24  --   --  23  --  24   < > 22   BUN  --   --  41*  --   --  36*  --  32*   < > 40*   CR  --   --  0.81  --   --  0.79  --  0.90   < > 1.23   ANIONGAP  --   --  6  --   --  6  --  7   < > 6   BRADY  --   --  9.6  --   --  9.6  --  10.2*   < > 8.4*   * 155* 184*   < >  --  190*   < > 164*   < > 152*   ALBUMIN  --   --   --   --   --  2.3*  --   --   --  2.4*   PROTTOTAL  --   --   --   --   --   --   --   --   --  6.4*   BILITOTAL  --   --   --   --   --   --   --   --   --  0.3   ALKPHOS  --   --   --   --   --   --   --   --   --  102   ALT  --   --   --   --   --   --   --   --   --  33   AST  --   --   --   --   --   --   --   --   --  36    < > = values in this interval not displayed.       No results found for this or any previous visit (from the past 24 hour(s)).

## 2021-12-13 NOTE — CONSULTS
Care Management Initial Consult    General Information  Assessment completed with: Children,         Primary Care Provider verified and updated as needed:     Readmission within the last 30 days:           Advance Care Planning: Advance Care Planning Reviewed: no concerns identified          Communication Assessment  Patient's communication style: spoken language (non-English)             Cognitive  Cognitive/Neuro/Behavioral: .WDL except  Level of Consciousness: confused  Arousal Level: opens eyes spontaneously  Orientation: disoriented to,time,situation,place  Mood/Behavior: anxious  Best Language: 0 - No aphasia  Speech: spontaneous (Afghan speaking)    Living Environment:   People in home: child(yoav), adult     Current living Arrangements: house      Able to return to prior arrangements:         Family/Social Support:  Care provided by: self  Provides care for: no one     Children          Description of Support System: Involved         Current Resources:   Patient receiving home care services:       Community Resources:    Equipment currently used at home:    Supplies currently used at home:      Employment/Financial:  Employment Status:          Financial Concerns:             Lifestyle & Psychosocial Needs:  Social Determinants of Health     Tobacco Use: Medium Risk     Smoking Tobacco Use: Former Smoker     Smokeless Tobacco Use: Never Used   Alcohol Use: Not on file   Financial Resource Strain: Not on file   Food Insecurity: Not on file   Transportation Needs: Not on file   Physical Activity: Not on file   Stress: Not on file   Social Connections: Not on file   Intimate Partner Violence: Not on file   Depression: Not at risk     PHQ-2 Score: 0   Housing Stability: Not on file       Functional Status:  Prior to admission patient needed assistance:   Dependent ADLs:: Son assists with ADL's  Dependent IADLs:: Son assists with ADL's       Mental Health Status:          Chemical Dependency Status:                 Values/Beliefs:  Spiritual, Cultural Beliefs, Sikh Practices, Values that affect care: no               Additional Information:  Writer spoke with pts son, who stated that pt lives with him and that he assists pt when needed. He stated that pt is not confused at baseline.     12/14 11:59a Addendum: Please see 12/14 note for more updated information.    NICOLE Clark

## 2021-12-13 NOTE — PLAN OF CARE
IMC status. Patient alert to self only. Does not appear to be in pain. Attempted to use jabber but very difficult with noises from HEPA filter. Patient did not respond when using the jabber, likely due to hearing difficulties. Patient very restless and continuously taking of HFNC. Sitter at beside and soft restraints on BUE. Patient did need to be placed on BiPAP again this afternoon as oxygen saturations dropped. NPO while on BiPAP. He did require 1x haldol for restlessness. Voiding with external catheter. Phosphorous replaced IV, recheck this evening. No BM this shift. Tele- NSR

## 2021-12-13 NOTE — PROGRESS NOTES
Called to evaluate the patient as he was getting agitated, pulled his IV out, pulling off his HFNC which causes precipitous desat of his spo2  He is mumbling predominantly incoherent Frisian words, non sensible, and not able to communicate his needs, he is fidgety and non directed movements  He redirects briefly with verbal or slight tactile stimulation but does not sustain it and repeats the processes  This occurred similarly last night around the same time. I am concerned the patient is sundowning  I reviewed his charts, labs, etc, I do not think there is a new medical emergency at this time and will dose haldol 2.5 and ativan 1 IV one time and repeat if necessary  Will start seroquel 50 BID for ICU like psychosis

## 2021-12-13 NOTE — PLAN OF CARE
Oriented to self only. Swedish speaking, using Jabbr. Sitter at bedside as pt is confused and pulling off O2 intermittently, restless. Stable on HFNC 80% and 50L ex intermittent bradycardia, slightly hypertensive. TELE SR/SB. GI consulted, continue protonix, Q6 hgb, no plans for scope. No BM this shift, Hgb stable. Tolerating regular diet, fair appetite. Incont of urine at times. L arm IV infiltrated, elevated, pt denies pain, +2 edema. Tylenol given x1 for L knee pain. Discharge pending improvement.

## 2021-12-13 NOTE — PROGRESS NOTES
CLINICAL NUTRITION SERVICES  -  ASSESSMENT NOTE      Recommendations Ordered by Registered Dietitian (RD):   Updated weight   Glucerna TID with meals   Thera-vit-m for micronutrient needs   Future/Additional Recommendations:   Consider need for nutrition support pending respiratory status    Malnutrition:   % Weight Loss:  None noted  % Intake:  </= 50% for >/= 5 days (severe malnutrition)  Subcutaneous Fat Loss:  Deferred with COVID-19 precautions   Muscle Loss:  Deferred with COVID-19 precautions   Fluid Retention:  Mild LUE     Malnutrition Diagnosis: Moderate malnutrition (at least)  In Context of:  Acute illness or injury       REASON FOR ASSESSMENT  Stephen Valerio is a 81 year old male seen by Registered Dietitian for Admission Nutrition Risk Screen   Have you recently lost weight without trying? Unsure  Have you been eating poorly because of a decreased appetite? No    Per chart review, PMH significant for T2DM, rheumatoid arthritis, HLD, gout, chronic back pain and chronic hyponatremia.  Admitted 2/2 hypoxia in setting of COVID-19 infection.       NUTRITION HISTORY  - Information obtained from chart review. Unable to obtain nutrition hx from pt at this time as he is noted to be encephalopathic, agitated and only oriented to self.   - Per H&P, pt was not eating much with nausea and fatigue for a couple days PTA.   - No known food allergies noted.       CURRENT NUTRITION ORDERS  Diet Order:     Regular     Current Intake/Tolerance:  Pt has been eating </= 25% of meals over the past 5 days of admission, with exception of 75% of dinner last evening (pot roast, green beans, white rice, OJ and vanilla ice cream). No meals ordered thus far today.       NUTRITION FOCUSED PHYSICAL ASSESSMENT FOR DIAGNOSING MALNUTRITION)  No:  Deferred at this time with COVID-19 precautions           Obtained from Chart/Interdisciplinary Team:  - Last BM on 12/12 - melena. Per GI, Hgb stable this morning. Not a candidate for  "EGD at this time with respiratory failure and positive COVID status.   - 2+ edema LUE    ANTHROPOMETRICS  Height: 5' 11\"  Weight: 165 lbs 0 oz  Body mass index is 23.01 kg/m .  Weight Status:  Normal BMI  Weight History: Weight has been relatively stable over the past several months. Seems to fluctuate between # at baseline. Admission wt on 12/8 potentially reported - will order for updated scale weight.   Wt Readings from Last 20 Encounters:   12/08/21 74.8 kg (165 lb)   11/07/21 70.7 kg (155 lb 13.8 oz)   11/05/21 73.9 kg (163 lb)   11/01/21 74 kg (163 lb 2.3 oz)   10/21/21 73.9 kg (163 lb)   10/20/21 74 kg (163 lb 3.2 oz)   10/15/21 72.6 kg (160 lb)   10/12/21 71.7 kg (158 lb)   10/05/21 73.5 kg (162 lb)   09/14/21 79.4 kg (175 lb)   09/08/21 79.2 kg (174 lb 9.6 oz)   08/19/21 76.2 kg (168 lb)   08/16/21 76.3 kg (168 lb 3.2 oz)   08/12/21 77.3 kg (170 lb 8 oz)   08/05/21 77.6 kg (171 lb)   04/09/14 73.3 kg (161 lb 8 oz)   03/26/14 75.3 kg (166 lb)   03/18/14 73.7 kg (162 lb 8 oz)   03/03/14 72.6 kg (160 lb)   02/20/14 73.5 kg (162 lb)       LABS  Labs reviewed  - Lytes WNL yesterday   - CRP 83.7 (H) --> trending down   - -239    MEDICATIONS  Medications reviewed  - Decadron   - Jardiance   - medium sliding scale insulin   - 12 units lantus at bedtime   - miralax and senna held       ASSESSED NUTRITION NEEDS PER APPROVED PRACTICE GUIDELINES:    Dosing Weight 75 kg (based on admission wt 12/8)  Estimated Energy Needs: 4996-9414 kcals (25-30 Kcal/Kg)  Justification: maintenance  Estimated Protein Needs:  grams protein (1.2-1.5 g pro/Kg)  Justification: preservation of lean body mass and anticipated hypercatabolism with COVID-19 infection   Estimated Fluid Needs: 2990-5440  mL (1 mL/Kcal)  Justification: maintenance or per provider pending fluid status      NUTRITION DIAGNOSIS:  Inadequate oral intake related to decreased appetite vs altered mentation as evidenced by consuming </= 25% of meals " daily over the past 5 days of admission and noted decreased intake x 2 days PTA.       NUTRITION INTERVENTIONS  Recommendations / Nutrition Prescription  Updated weight   Glucerna TID with meals  Thera-vit-m for micronutrient needs  Consider need for nutrition support pending respiratory status       Implementation  Nutrition education: Not appropriate at this time due to patient condition  Medical Food Supplement and Multivitamin/Mineral      Nutrition Goals  Patient will consume >/= 50% of meals and supplements daily       MONITORING AND EVALUATION:  Progress towards goals will be monitored and evaluated per protocol and Practice Guidelines      Jessica Talamantes RD, LD  Unit RD Pager: [phone]

## 2021-12-13 NOTE — PLAN OF CARE
Summary: 9970 - 1630    Cognition/Mentation: Patient oriented to self, extremely agitated/restless at times, pulling at O2 and IV,  IV Ativan/Haldol given x2 with some effect    VS: remains on HFNC, O2 sats drop to mid-70s when cannula out of place, BP elevated when agitated, vitals otherwise stable    Cardiac: SR    Respiratory: LS diminished, DAVALOS    GI/: incontinent of urine overnight, external catheter placed, no stool overnight    Activity: in bed overnight    Drains/Tubes: PIV

## 2021-12-13 NOTE — PROGRESS NOTES
[facility]  Gastroenterology Progress Note     Stephen Valerio MRN# [id number]   YOB: 1940 Age: 81 year old          Assessment and Plan:   Stephen Valerio is a 81 year old male with history of diabetes mellitus type 2, rheumatoid arthritis, hyperlipidemia, gout, chronic back pain, chronic hyponatremia, who was recently admitted in the hospital from 11/07 to 11/11 for community-acquired pneumonia and acute hypoxic respiratory failure following his left total knee arthroplasty on 11/01/2021.  Admitted on 12/8/21 with COVID-19 pneumonia.    Infection due to 2019 novel coronavirus  Hypoxia  Has increased oxygen demand on 55 LPM  CT chest PE protocol done on 12/10 negative for PE but does shows moderate to severe bilateral infiltrate.     Encephalopathic  secondary to hypoxia and hypercapnia is now improving.     Melena  Anemia  There was concern for possible GI bleeding given large dark stool, Patient remain hemodynamically stable and this morning Hemoglobin stable around 10.  Lovenox held and given Protonix 40 mg twice daily.  Serial Hgb every 8-12 hrs  --Hemoglobin stable and ok with GI to restart Lovenox   --Not a candidate for any EGD at this time, given respiratory failure and COVID positive status  -- Continue with twice daily pantoprazole 40 mg                  Interval History:   denies abdominal pain and no shortness of breath with High flow O2              Review of Systems:   C: NEGATIVE for fever, chills, change in weight  E/M: NEGATIVE for ear, mouth and throat problems  R: NEGATIVE for significant cough or SOB  CV: NEGATIVE for chest pain, palpitations or peripheral edema             Medications:   I have reviewed this patient's current medications    [Held by provider] remdesivir  100 mg Intravenous Q24H    And     [Held by provider] sodium chloride 0.9%  50 mL Intravenous Q24H     allopurinol  300 mg Oral QAM     dexamethasone (DECADRON) intermittent  infusion  20 mg Intravenous Daily     [START ON 12/15/2021] dexamethasone  10 mg Intravenous Q24H     empagliflozin  10 mg Oral Daily     [Held by provider] enoxaparin ANTICOAGULANT  40 mg Subcutaneous Q24H     haloperidol lactate         haloperidol lactate  2.5 mg Intravenous Once     insulin aspart  1-7 Units Subcutaneous TID AC     insulin aspart  1-5 Units Subcutaneous At Bedtime     insulin glargine  12 Units Subcutaneous At Bedtime     ipratropium-albuterol  2 puff Inhalation 4x Daily     latanoprost  1 drop Both Eyes At Bedtime     LORazepam         LORazepam         pantoprazole  40 mg Oral BID AC     piperacillin-tazobactam  3.375 g Intravenous Q6H     polyethylene glycol  17 g Oral Daily     QUEtiapine  50 mg Oral At Bedtime     senna-docusate  1 tablet Oral BID     sodium chloride (PF)  3 mL Intracatheter Q8H     sodium chloride (PF)  3 mL Intracatheter Q8H                  Physical Exam:   Vitals were reviewed  Vital Signs with Ranges  Temp:  [97.5  F (36.4  C)-98.2  F (36.8  C)] 98.2  F (36.8  C)  Pulse:  [] 86  Resp:  [15-40] 31  BP: ()/(53-99) 128/78  FiO2 (%):  [80 %-100 %] 100 %  SpO2:  [89 %-98 %] 90 %  I/O last 3 completed shifts:  In: 240 [P.O.:240]  Out: 1075 [Urine:1075]  Constitutional: moderate distress and cooperative   Cardiovascular: negative, PMI normal. No lifts, heaves, or thrills. RRR. No murmurs, clicks gallops or rub  Respiratory: Lungs clear, positive findings: No increased work of breathing, diminished air entry bilaterally   Abdomen: Abdomen soft, non-tender. BS normal. No masses, organomegaly           Data:   I reviewed the patient's new clinical lab test results.   Recent Labs   Lab Test 12/12/21  2358 12/12/21  1913 12/12/21  0548 12/11/21  0528 12/10/21  0951 02/12/14  0636 02/11/14  1720   WBC  --   --  2.2* 2.6* 6.9   < > 7.9   HGB 10.5* 11.0* 10.0* 10.0* 11.0*   < > 11.3*   MCV  --   --  101* 99 99   < > 98   PLT  --   --  206 234 282   < > 259   INR  --   --    --   --   --   --  1.16*    < > = values in this interval not displayed.     Recent Labs   Lab Test 12/12/21  0548 12/11/21  0523 12/10/21  0951   POTASSIUM 3.9 4.5 4.5   CHLORIDE 111* 110* 106   CO2 24 23 24   BUN 41* 36* 32*   ANIONGAP 6 6 7     Recent Labs   Lab Test 12/11/21  0523 12/08/21  1450 12/08/21  1313 11/07/21  1756 11/07/21  1501 10/21/21  1549 10/08/21  0816 09/28/21  1735 08/12/21  1125   ALBUMIN 2.3*  --  2.4*  --  2.4* 3.2*   < > 3.1* 3.8   BILITOTAL  --   --  0.3  --  0.8  --   --   --  0.4   ALT  --   --  33  --  20 15   < >  --  26   AST  --   --  36  --  15 9   < >  --  21   PROTEIN  --  20 *  --  Negative  --   --   --  Negative  --     < > = values in this interval not displayed.       I reviewed the patient's new imaging results.    All laboratory data reviewed  All imaging studies reviewed by me.    Jaqueline Bateman PA-C,  12/13/2021  [facility]  Office : [phone]  Cell: [phone] (Dr. Arroyo)  Cell: [phone] (Jaqueline Bateman PA-C)

## 2021-12-13 NOTE — PROGRESS NOTES
SW: Writer left a vm for pts son, asking for a call back to complete consult.     P: Will continue to follow.    NICOLE Manzano

## 2021-12-13 NOTE — PROGRESS NOTES
[facility]    Hospitalist Progress Note    Brief Summary:   This is a 81-year-old male with history of diabetes mellitus type 2, rheumatoid arthritis, hyperlipidemia, gout, chronic back pain, chronic hyponatremia, who was recently admitted in the hospital from 11/07 to 11/11 for community-acquired pneumonia and acute hypoxic respiratory failure following his left total knee arthroplasty on 11/01/2021.  The patient is now coming to the ER with complaint of back pain, coughing, feeling fatigued and weak for the last couple of days  PTA     Assessment & Plan       Acute hypoxic respiratory failure, likely secondary to COVID-19 pneumonia:   Possible Bacterial Pneumonia   Acute hypoxic hypercapnic respiratory failure.   Acute Encephalopathy secondary to hypoxia and Hypercapnia.   This is a 81-year-old male,  He has been vaccinated in [place], does not remember what vaccination.  He is immunocompromised status given use of methotrexate for his rheumatoid arthritis.  He was recently tested negative COVID last month and was treated for pneumonia after his total knee arthroplasty.  He is now coming here with coughing, coughing up some phlegm, fatigue, tired, weakness, back pain.  Chest x-ray does show bibasilar airspace pulmonary opacity, left worse than right, worrisome for infection.  His COVID-19 is now positive.      IV dexamethasone 6 mg daily for 10 days    IV remdesivir on hold due to bradycardia (case reports confirm possible association)    Lovenox 40 mg daily     Follow iInflammatory markers     Combivent inhaler every 4 hours.      Procalcitonin elevated to 1.6 on admission    He has been on IV Zosyn since 12/9.  Recheck procalcitonin.    CT chest PE protocol done on 12/10 negative for PE but does shows moderate to severe bilateral infiltrates.     Increased Dexamethasone to 20 mg daily for 5 days then 10 mg daily to complete 10 day course on 12/10 because of increasing oxygen requirement.         Diabetes mellitus type 2:  Medications include metformin and Jardiance.      Hold metformin.  Continue Jardiance.      Lantus 12 units every evening.      sliding scale insulin for correction and hypoglycemia protocol.      Rheumatoid arthritis:  He is on methotrexate 20 mg on Mondays.     hold methotrexate     Prednisone 5 mg b.i.d.  We will hold prednisone while he is on dexamethasone     Gout    Continue allopurinol.     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  Ranges from 129-135 at this time.      hold further intravenous fluids, sodium is now normal     Osteoarthritis of bilateral knees:  He is status post TKA of left knee 11/01/2021.    His left knee on examination looks good.      tylenol for pain.      Dark BM concern for GI bleeding.   There was concern for possible GI bleeding given large dark stool, Patient remain hemodynamically stable and this morning Hemoglobin stable around 10.     Continue oral Protonix 40 bid    recheck Hb every 8 hrs to see the trend, if Hb remain stable    Okay with GI top restart Lovenox    GI consult requested, cannot have EGD at this time, given respiratory failure.  I appreciate Jaqueline Bateman's follow up       DVT Prophylaxis: Enoxaparin (Lovenox) subcutaneous  Code Status: Full Code    Disposition: Expected discharge in >2  days once stable .    Discussed with patient and bedside nurse    Polly Maloney MD  Text Page  (7am - 6pm)    Interval History   Patient is nonverbal at the time of our interview, cannot obtain review of systems.  Discussed with RN, Shon is restless, has bedside attendant to keep him from removing lines and tubes.  He did not take oral medications today and has had little to eat or drink.    -Data reviewed today: I reviewed all new labs and imaging results over the last 24 hours. I personally reviewed no images or EKG's today.    Physical Exam   Temp: 98.2  F (36.8  C) Temp src: Axillary BP: 128/78 Pulse: 86    Resp: (!) 31 SpO2: 90 % O2 Device: High Flow Nasal Cannula (HFNC) Oxygen Delivery: 55 LPM  Vitals:    12/08/21 1128   Weight: 74.8 kg (165 lb)     Vital Signs with Ranges  Temp:  [97.5  F (36.4  C)-98.2  F (36.8  C)] 98.2  F (36.8  C)  Pulse:  [] 86  Resp:  [15-40] 31  BP: ()/(53-99) 128/78  FiO2 (%):  [80 %-100 %] 100 %  SpO2:  [89 %-98 %] 90 %  I/O last 3 completed shifts:  In: 240 [P.O.:240]  Out: 1075 [Urine:1075]    Constitutional: Awake, does not open eyes or speak   Respiratory: tachypneic, diminished air entry bilaterally   Cardiovascular: regular rate and rhythm  GI:  Abdomen is scaphoid, there are normal bowel sounds, soft, non-distended, non-tender, no masses palpated  Skin: solar changes, especially on the neck.  Hyperpigmentation of lower extremities consistent with chronic venous stasis.  Nails are long, dystrophic.  Musculoskeletal: no lower extremity edema   Neurologic: restless, confused.  Moves arms and legs.    Medications     - MEDICATION INSTRUCTIONS -       - MEDICATION INSTRUCTIONS -       - MEDICATION INSTRUCTIONS -         [Held by provider] remdesivir  100 mg Intravenous Q24H    And     [Held by provider] sodium chloride 0.9%  50 mL Intravenous Q24H     allopurinol  300 mg Oral QAM     dexamethasone (DECADRON) intermittent infusion  20 mg Intravenous Daily     [START ON 12/15/2021] dexamethasone  10 mg Intravenous Q24H     empagliflozin  10 mg Oral Daily     [Held by provider] enoxaparin ANTICOAGULANT  40 mg Subcutaneous Q24H     haloperidol lactate         haloperidol lactate  2.5 mg Intravenous Once     insulin aspart  1-7 Units Subcutaneous TID AC     insulin aspart  1-5 Units Subcutaneous At Bedtime     insulin glargine  12 Units Subcutaneous At Bedtime     ipratropium-albuterol  2 puff Inhalation 4x Daily     latanoprost  1 drop Both Eyes At Bedtime     LORazepam         LORazepam         pantoprazole  40 mg Oral BID AC     piperacillin-tazobactam  3.375 g Intravenous Q6H      polyethylene glycol  17 g Oral Daily     QUEtiapine  50 mg Oral At Bedtime     senna-docusate  1 tablet Oral BID     sodium chloride (PF)  3 mL Intracatheter Q8H     sodium chloride (PF)  3 mL Intracatheter Q8H       Data   Recent Labs   Lab 12/13/21  0718 12/13/21  0240 12/12/21  2358 12/12/21  2122 12/12/21  1913 12/12/21  0816 12/12/21  0548 12/11/21  2151 12/11/21  0528 12/11/21  0523 12/10/21  1340 12/10/21  0951 12/08/21  1847 12/08/21  1313   WBC  --   --   --   --   --   --  2.2*  --  2.6*  --   --  6.9   < >  --    HGB  --   --  10.5*  --  11.0*  --  10.0*  --  10.0*  --   --  11.0*   < >  --    MCV  --   --   --   --   --   --  101*  --  99  --   --  99   < >  --    PLT  --   --   --   --   --   --  206  --  234  --   --  282   < >  --    NA  --   --   --   --   --   --  141  --   --  139  --  137   < > 129*   POTASSIUM  --   --   --   --   --   --  3.9  --   --  4.5  --  4.5   < > 4.2   CHLORIDE  --   --   --   --   --   --  111*  --   --  110*  --  106   < > 101   CO2  --   --   --   --   --   --  24  --   --  23  --  24   < > 22   BUN  --   --   --   --   --   --  41*  --   --  36*  --  32*   < > 40*   CR  --   --   --   --   --   --  0.81  --   --  0.79  --  0.90   < > 1.23   ANIONGAP  --   --   --   --   --   --  6  --   --  6  --  7   < > 6   BRADY  --   --   --   --   --   --  9.6  --   --  9.6  --  10.2*   < > 8.4*   * 168*  --  239*  --    < > 184*   < >  --  190*   < > 164*   < > 152*   ALBUMIN  --   --   --   --   --   --   --   --   --  2.3*  --   --   --  2.4*   PROTTOTAL  --   --   --   --   --   --   --   --   --   --   --   --   --  6.4*   BILITOTAL  --   --   --   --   --   --   --   --   --   --   --   --   --  0.3   ALKPHOS  --   --   --   --   --   --   --   --   --   --   --   --   --  102   ALT  --   --   --   --   --   --   --   --   --   --   --   --   --  33   AST  --   --   --   --   --   --   --   --   --   --   --   --   --  36    < > = values in this interval not  displayed.       No results found for this or any previous visit (from the past 24 hour(s)).

## 2021-12-14 NOTE — PROVIDER NOTIFICATION
MD Notification    Notified Person: MD    Notified Person Name: Dr. Jo    Notification Date/Time: 21    Notification Interaction: Paged    Purpose of Notification: Request renewal for bilateral soft wrist restraints    Orders Received: : Restraints renewed for bilateral soft wrists    Comments: Appears bilateral wrist restraint orders were not renewed today- at 10pm. Continues to need these, antipsychotic meds, and a sitter to maintain BiPAP placement for COVID respiratory failure.    *Will place sticky for MD to renew during rounding hours on  should they still be needed.

## 2021-12-14 NOTE — PROGRESS NOTES
[facility]    Hospitalist Progress Note    Brief Summary:   This is a 81-year-old male with history of diabetes mellitus type 2, rheumatoid arthritis, hyperlipidemia, gout, chronic back pain, chronic hyponatremia, who was recently admitted in the hospital from 11/07 to 11/11 for community-acquired pneumonia and acute hypoxic respiratory failure following his left total knee arthroplasty on 11/01/2021.  The patient is now coming to the ER with complaint of back pain, coughing, feeling fatigued and weak for the last couple of days  PTA     Assessment & Plan       Acute hypoxic respiratory failure, likely secondary to COVID-19 pneumonia:   Possible Bacterial Pneumonia   Acute hypoxic hypercapnic respiratory failure.   Acute Encephalopathy secondary to hypoxia and Hypercapnia.   This is a 81-year-old male,  He has been vaccinated in [place], does not remember what vaccination.  He is immunocompromised status given use of methotrexate for his rheumatoid arthritis.  He was recently tested negative COVID last month and was treated for pneumonia after his total knee arthroplasty.  He is now coming here with coughing, coughing up some phlegm, fatigue, tired, weakness, back pain.  Chest x-ray does show bibasilar airspace pulmonary opacity, left worse than right, worrisome for infection.  His COVID-19 is now positive.      IV dexamethasone 6 mg daily for 10 days    IV remdesivir on hold due to bradycardia (case reports confirm possible association)    Lovenox 40 mg daily     Follow iInflammatory markers     Combivent inhaler every 4 hours.      Procalcitonin elevated to 1.6 on admission    He has been on IV Zosyn since 12/9.  Recheck procalcitonin.    CT chest PE protocol done on 12/10 negative for PE but does shows moderate to severe bilateral infiltrates.     Increased Dexamethasone to 20 mg daily for 5 days then 10 mg daily to complete 10 day course on 12/10 because of increasing oxygen requirement.         Diabetes mellitus type 2:  Medications include metformin and Jardiance.      Hold metformin.  Continue Jardiance.      Lantus 12 units every evening.      sliding scale insulin for correction and hypoglycemia protocol.      Rheumatoid arthritis:  He is on methotrexate 20 mg on Mondays.     hold methotrexate     Prednisone 5 mg b.i.d.  We will hold prednisone while he is on dexamethasone     Gout    Continue allopurinol.     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  Ranges from 129-135 at this time.      hold further intravenous fluids, sodium is now normal     Osteoarthritis of bilateral knees:  He is status post TKA of left knee 11/01/2021.    His left knee on examination looks good.      tylenol for pain.      Dark BM concern for GI bleeding.   There was concern for possible GI bleeding given large dark stool, Patient remain hemodynamically stable and this morning Hemoglobin stable around 10.     Continue oral Protonix 40 bid    recheck Hb every 8 hrs to see the trend, if Hb remain stable    Okay with GI top restart Lovenox    GI consult requested, cannot have EGD at this time, given respiratory failure.  I appreciate Jaqueline Bateman's follow up       DVT Prophylaxis: Enoxaparin (Lovenox) subcutaneous  Code Status: Full Code    Disposition: Expected discharge in >2  days once stable .    Discussed with patient and bedside nurse    Polly Maloney MD  Text Page  (7am - 6pm)    Interval History   Patient is awake but nonverbal, cannot obtain systems from him.  Discussed with RN, who is at bedside.  Stephen is restless, tries to get out of bed.  He has increased work of breathing, improved when BiPAP mask was applied.  His oral intake is poor.    -Data reviewed today: I reviewed all new labs and imaging results over the last 24 hours. I personally reviewed no images or EKG's today.    Physical Exam   Temp: 99.5  F (37.5  C) Temp src: Axillary BP: (!) 140/77 Pulse: 89   Resp: (!)  36 SpO2: 97 % O2 Device: BiPAP/CPAP Oxygen Delivery: 60 LPM  Vitals:    12/08/21 1128   Weight: 74.8 kg (165 lb)     Vital Signs with Ranges  Temp:  [97.6  F (36.4  C)-99.5  F (37.5  C)] 99.5  F (37.5  C)  Pulse:  [] 89  Resp:  [18-56] 36  BP: (118-148)/() 140/77  FiO2 (%):  [75 %-100 %] 75 %  SpO2:  [81 %-98 %] 97 %  I/O last 3 completed shifts:  In: 223 [I.V.:223]  Out: 500 [Urine:500]    Constitutional: Awake, eyes are open  Respiratory: tachypneic, diminished air entry bilaterally   Cardiovascular: regular rate and rhythm  GI:  Abdomen is scaphoid, there are normal bowel sounds, soft, non-distended, non-tender, no masses palpated  Skin: solar changes, especially on the neck.  Hyperpigmentation of lower extremities consistent with chronic venous stasis.  Nails are long, dystrophic.  Musculoskeletal: no lower extremity edema   Neurologic: restless, confused.  Moves arms and legs.    Medications     - MEDICATION INSTRUCTIONS -       - MEDICATION INSTRUCTIONS -       - MEDICATION INSTRUCTIONS -         allopurinol  300 mg Oral QAM     [START ON 12/15/2021] dexamethasone  10 mg Intravenous Q24H     empagliflozin  10 mg Oral Daily     enoxaparin ANTICOAGULANT  40 mg Subcutaneous Q24H     haloperidol lactate  2.5 mg Intravenous Once     insulin aspart  1-7 Units Subcutaneous TID AC     insulin aspart  1-5 Units Subcutaneous At Bedtime     insulin glargine  12 Units Subcutaneous At Bedtime     ipratropium-albuterol  2 puff Inhalation 4x Daily     latanoprost  1 drop Both Eyes At Bedtime     multivitamin w/minerals  1 tablet Oral Daily     pantoprazole  40 mg Oral BID AC     piperacillin-tazobactam  3.375 g Intravenous Q6H     polyethylene glycol  17 g Oral Daily     sodium phosphate  9 mmol Intravenous Once     QUEtiapine  50 mg Oral At Bedtime     senna-docusate  1 tablet Oral BID     sodium chloride (PF)  3 mL Intracatheter Q8H       Data   Recent Labs   Lab 12/14/21  0732 12/14/21  0610 12/14/21  0210  12/13/21  1134 12/13/21  1132 12/13/21  0240 12/12/21  2358 12/12/21  0816 12/12/21  0548 12/11/21  2151 12/11/21  0528 12/11/21  0523 12/08/21  1847 12/08/21  1313   WBC  --  5.6  --   --   --   --   --   --  2.2*  --  2.6*  --    < >  --    HGB  --  12.3*  --   --  12.4*  --  10.5*   < > 10.0*  --  10.0*  --    < >  --    MCV  --  99  --   --   --   --   --   --  101*  --  99  --    < >  --    PLT  --  139*  --   --   --   --   --   --  206  --  234  --    < >  --    NA  --  146*  --   --  143  --   --   --  141  --   --  139   < > 129*   POTASSIUM  --  3.9  --   --  3.6  --   --   --  3.9  --   --  4.5   < > 4.2   CHLORIDE  --  112*  --   --  110*  --   --   --  111*  --   --  110*   < > 101   CO2  --  27  --   --  28  --   --   --  24  --   --  23   < > 22   BUN  --  37*  --   --  40*  --   --   --  41*  --   --  36*   < > 40*   CR  --  0.72  --   --  0.82  --   --   --  0.81  --   --  0.79   < > 1.23   ANIONGAP  --  7  --   --  5  --   --   --  6  --   --  6   < > 6   BRADY  --  10.2*  --   --  10.6*  --   --   --  9.6  --   --  9.6   < > 8.4*   * 137* 154*   < > 131*   < >  --    < > 184*   < >  --  190*   < > 152*   ALBUMIN  --   --   --   --  3.0*  --   --   --   --   --   --  2.3*  --  2.4*   PROTTOTAL  --   --   --   --   --   --   --   --   --   --   --   --   --  6.4*   BILITOTAL  --   --   --   --   --   --   --   --   --   --   --   --   --  0.3   ALKPHOS  --   --   --   --   --   --   --   --   --   --   --   --   --  102   ALT  --   --   --   --   --   --   --   --   --   --   --   --   --  33   AST  --   --   --   --   --   --   --   --   --   --   --   --   --  36    < > = values in this interval not displayed.       No results found for this or any previous visit (from the past 24 hour(s)).

## 2021-12-14 NOTE — PROVIDER NOTIFICATION
Paged Dr. Maloney at [phone]    TRICE OWENS. Patient has been on HFNC for 1.5hrs, tolerating it well. O2 sats 94%. Are you OK with us starting him on a soft diet soon? Thanks! Norma CHAKRABORTY RN

## 2021-12-14 NOTE — PROVIDER NOTIFICATION
Paged Dr. Maloney at 1800    Patient is becoming increasingly agitated/trying to pull off BiPAP even with sitter. PRN haldol not effective. Is there another PRN we can try? Thanks! patria CHAKRABORTY RN

## 2021-12-14 NOTE — PLAN OF CARE
[facility] status. Patient alert to self only. Does not appear to be in pain. Jabber very difficult to use with noises from HEPA filter. Patient still restless but improved from yesterday. He did require 1x haldol for restlessness. Soft retraints removed. Sitter remains at beside. Patient back on HFNC, maxed out. He did have a few bites of dinner and juice. Voiding with external catheter, no BM. Phosphorous replaced IV, tomorrow AM. No BM this shift. Tele- NSR

## 2021-12-14 NOTE — PROGRESS NOTES
Granddaughter Celeste updated this evening. She would appreciate an update from hospitalist on 12/14.

## 2021-12-14 NOTE — PROGRESS NOTES
Care Management Follow Up    Length of Stay (days): 6    Expected Discharge Date: 12/20/2021     Concerns to be Addressed: discharge planning     Patient plan of care discussed at interdisciplinary rounds: Yes    Anticipated Discharge Disposition:  Home with Homecare vs TCU     Anticipated Discharge Services:    Anticipated Discharge DME:      Patient/family educated on Medicare website which has current facility and service quality ratings:    Education Provided on the Discharge Plan:    Patient/Family in Agreement with the Plan: yes    Referrals Placed by CM/SW:    Private pay costs discussed: Not applicable    Additional Information:  Writer received a vm from pts mamadou Smith, who stated that her uncle (pts son) asked her to call and get an update because he did not fully understand the conversation yesterday with the language barrier. Writer introduced role and spoke about discharge planning.     She stated that Pt is not confused at baseline, unless he is in the hospital. She stated that he was hospitalized a month ago after a knee surgery for pneumonia and was confused in the hospital then as well.     She stated that prior to the surgery, the pt was independent at baseline and used a walker. Pt is currently open to [facility] ([phone]) for PT/OT.     Plan to see how pt does to determine discharge plan.     NICOLE Clark

## 2021-12-14 NOTE — PROGRESS NOTES
Antimicrobial Stewardship Team Note    Antimicrobial Stewardship Program - A joint venture between [facility] and [facility] to optimize antibiotic management.     Patient: Stephen Valerio  MRN: [id number]  Allergies: Patient has no known allergies.    Brief Summary: 81-year-old male with history of diabetes mellitus type 2, rheumatoid arthritis, hyperlipidemia, gout, chronic back pain, chronic hyponatremia, who was recently admitted in the hospital from 11/07 to 11/11 for community-acquired pneumonia and acute hypoxic respiratory failure following his left total knee arthroplasty on 11/01/2021.  The patient is now coming to the ER with complaint of back pain, coughing, feeling fatigued and weak for the last couple of days  PTA     Acute hypoxic respiratory failure, likely secondary to COVID-19 pneumonia:   Possible Bacterial Pneumonia   Acute hypoxic hypercapnic respiratory failure.   Acute Encephalopathy secondary to hypoxia and Hypercapnia.   He has been vaccinated in [place], does not remember what vaccination.  He is immunocompromised status given use of methotrexate for his rheumatoid arthritis.  He was recently tested negative COVID last month and was treated for pneumonia after his total knee arthroplasty.  He is now coming here with coughing, coughing up some phlegm, fatigue, tired, weakness, back pain.  Chest x-ray does show bibasilar airspace pulmonary opacity, left worse than right, worrisome for infection.  His COVID-19 is now positive.    IV dexamethasone 6 mg daily for 10 days  IV remdesivir on hold due to bradycardia (case reports confirm possible association)  Procalcitonin elevated to 1.6 on admission -> 0.11 today  He has been on IV Zosyn since 12/9.    CT chest PE protocol done on 12/10 negative for PE but does shows moderate to severe bilateral infiltrates.   Increased Dexamethasone to 20 mg daily for 5 days then 10 mg daily to complete 10 day course  on 12/10 because of increasing oxygen requirement.            Active Anti-infective Medications   (From admission, onward)                 Start     Stop    12/09/21 1330  piperacillin-tazobactam  3.375 g,   Intravenous,   EVERY 6 HOURS        PNA        --                  Assessment: Procalcitonin has declined from 1.6 to 0.11.  Recommend completing 7 days of pip/tazo for a possible bacterial pneumonia and then stopping (7 days = stop on 12/15).    Recommendations:  Medication Change:   -  .  Consult Recommendation:     Recommended labs:     Other Recommendation(s): define duration of therapy -  7 days of pip/tazo (stop 12/15)    Pharmacy took the following actions: Electronic note created,Sticky note reminder created.    Discussed with ID Staff: Dr López Warren, Pharm[facility], [facility]    Vital Signs/Clinical Features:  Vitals  Report        12/12 0700  12/13 0659 12/13 0700  12/14 0659 12/14 0700  12/14 1256   Most Recent      Temp ( F) 97.5 -  98.1    97.6 -  99.5      99.2     99.2 (37.3) 12/14 1120    Pulse 42 -  103    70 -  101    79 -  90     79 12/14 1200    Resp 13 -  40    18 -  56    24 -  29     24 12/14 1200    BP 98/65 -  170/53    118/80 -  148/102    122/71 -  148/71     148/71 12/14 1200    SpO2 (%) 89 -  100    81 -  98    91 -  94     93 12/14 1200            Labs  Estimated Creatinine Clearance: 88 mL/min (based on SCr of 0.72 mg/dL).  Recent Labs   Lab Test 12/09/21  0735 12/10/21  0951 12/11/21  0523 12/12/21  0548 12/13/21  1132 12/14/21  0610   CR 0.85 0.90 0.79 0.81 0.82 0.72       Recent Labs   Lab Test 02/11/14  1720 02/12/14  0636 12/08/21  1220 12/09/21  0735 12/10/21  0951 12/11/21  0528 12/12/21  0548 12/12/21  1913 12/12/21  2358 12/13/21  1132 12/14/21  0610   WBC 7.9   < > 9.0 11.0 6.9 2.6* 2.2*  --   --   --  5.6   ANEU 5.5  --   --   --   --  2.5  --   --   --   --  5.3   ALYM 1.2  --   --   --   --  0.1*  --   --   --   --  0.3*   ELE 1.0  --   --   --   --  0.0  --   --    --   --  0.0   AEOS 0.0  --   --   --   --  0.0  --   --   --   --  0.0   HGB 11.3*   < > 10.9* 10.5* 11.0* 10.0* 10.0* 11.0* 10.5* 12.4* 12.3*   HCT 33.1*   < > 34.6* 32.9* 35.0* 33.4* 33.6*  --   --   --  40.5   MCV 98   < > 98 97 99 99 101*  --   --   --  99      < > 135* 201 282 234 206  --   --   --  139*    < > = values in this interval not displayed.       Recent Labs   Lab Test 02/11/14  1720 03/03/14  1446 03/21/14  0000 08/05/21  0929 08/12/21  1125 09/28/21  1735 10/08/21  0816 10/12/21  1100 10/21/21  1549 11/07/21  1501 12/08/21  1313 12/11/21  0523 12/13/21  1132   BILITOTAL 1.3 0.4  --  0.5 0.4  --   --   --   --  0.8 0.3  --   --    ALKPHOS 87 97  --  51 54  --   --   --   --  137 102  --   --    ALBUMIN 3.3 3.7   < > 3.8 3.8   < > 3.5 3.7 3.2* 2.4* 2.4* 2.3* 3.0*   AST 31 42   < > 30 21  --  13 9 9 15 36  --   --    ALT 53 54   < > 38 26  --  22 18 15 20 33  --   --     < > = values in this interval not displayed.       Recent Labs   Lab Test 02/11/14  1720 08/12/21  1125 08/12/21  1125 11/02/21  0822 11/07/21  1501 11/08/21  0803 11/10/21  0820 12/08/21  1220 12/08/21  1313 12/09/21  0735 12/10/21  0951 12/11/21  0523 12/11/21  0528 12/12/21  0548 12/13/21  1132 12/14/21  0610   PCAL  --   --   --   --   --   --   --   --  1.60*  --   --   --   --   --   --  0.11*   LACT 1.0  --   --  2.2* 1.3  --   --   --  0.9  --   --   --   --   --   --   --    CRP  --  11.0*   < >  --   --  372.0*   < >  --  257.0* 344.0* 277.0* 150.0*  --  83.7*  --  101.0*   SED 94* 42*  --   --   --  112*  --  82*  --   --   --   --  87*  --  73*  --     < > = values in this interval not displayed.       Recent Labs   Lab Test 02/13/14  0933   VANCOMYCIN 7.3       Culture Results:  7-Day Micro Results       ** No results found for the last 168 hours. **            Recent Labs   Lab Test 02/11/14  1745 09/28/21  1735 11/07/21  1756 12/08/21  1450   URINEPH 8.0* 5.0 5.0 5.5   NITRITE Negative Negative Negative  Negative   LEUKEST Negative Negative Negative Negative   WBCU 2 1 0 1                         Imaging: XR Chest Port 1 View    Result Date: 12/10/2021  XR CHEST PORT 1 VIEW 12/10/2021 10:10 AM HISTORY: SOB COMPARISON: 12/8/2021     IMPRESSION: Left perihilar and basilar infiltrate has slightly improved. No pleural effusion or pneumothorax. Normal heart size. CYNDIE ROBINS MD   SYSTEM ID:  [id number]    XR Chest Port 1 View    Result Date: 12/8/2021  CHEST ONE VIEW PORTABLE  12/8/2021 2:34 PM HISTORY: Shortness of breath. COMPARISON: Chest x-ray on 11/7/2021     IMPRESSION: Single AP view of the chest was obtained. Cardiomediastinal silhouette is within normal limits. Bibasilar airspace pulmonary opacities, left worse than right, worrisome for infection. No significant pleural effusion or pneumothorax. DERRICK PRINGLE MD   SYSTEM ID:  [id number]    CT Chest Pulmonary Embolism w Contrast    Result Date: 12/10/2021  EXAM: CT CHEST PULMONARY EMBOLISM W CONTRAST LOCATION: [facility] DATE/TIME: 12/10/2021 4:42 PM INDICATION: Chest pain. PE suspected, high prob. COMPARISON: None. TECHNIQUE: CT chest pulmonary angiogram during arterial phase injection of IV contrast. Multiplanar reformats and MIP reconstructions were performed. Dose reduction techniques were used. CONTRAST: 64 mL Isovue-370 FINDINGS: ANGIOGRAM CHEST: Pulmonary arteries are normal caliber and negative for pulmonary emboli. Thoracic aorta is negative for dissection. No CT evidence of right heart strain. LUNGS AND PLEURA: Moderate to severe infiltrates, including ground-glass and consolidative changes. MEDIASTINUM/AXILLAE: A few minimally prominent lymph nodes are noted, likely reactive in the setting. Moderately atherosclerotic nonaneurysmal aorta. CORONARY ARTERY CALCIFICATION: None. UPPER ABDOMEN: Cholelithiasis without cholecystitis. MUSCULOSKELETAL: No frankly destructive bony lesions.     IMPRESSION: 1.  No pulmonary embolism  demonstrated. 2.  Moderate to severe bilateral infiltrates.    CT Head w/o Contrast    Result Date: 12/8/2021  EXAM: CT HEAD W/O CONTRAST LOCATION: [facility] DATE/TIME: 12/8/2021 5:38 PM INDICATION: Confusion COMPARISON: None. TECHNIQUE: Routine CT Head without IV contrast. Multiplanar reformats. Dose reduction techniques were used. FINDINGS: INTRACRANIAL CONTENTS: No intracranial hemorrhage, extraaxial collection, or mass effect.  No CT evidence of acute infarct. Moderate presumed chronic small vessel ischemic changes. Moderate generalized volume loss. No hydrocephalus. VISUALIZED ORBITS/SINUSES/MASTOIDS: No intraorbital abnormality. No paranasal sinus mucosal disease. No middle ear or mastoid effusion. BONES/SOFT TISSUES: No acute abnormality.     IMPRESSION: 1.  No acute intracranial process.

## 2021-12-14 NOTE — PLAN OF CARE
DATE & TIME: 12/13/21 1900 - 12/14/21 0700    COGNITION/BEHAVIOR: Occitan speaking.  Does not provide response to questions.  Restless, agitated.  When restraints are removed for CMS checks, hands go towards pulling at lines.  Vital signs:  Temp: 99.5  F (37.5  C) Temp src: Axillary BP: (!) 140/77 Pulse: 89   Resp: (!) 36 SpO2: 97 % via BiPAP at 75% FiO2  TELEMETRY RHYTHM: SR  MOBILITY: Total, turn/repo.  Restless in bed, so does not offload from back well.  PAIN: Unknown  DIET: Regular diet ordered, but NPO due to BiPAP use  RESP: MELISSA due to PAPR and HEPA filter use.  Tachypneic.  CV: HRRR  GI/: Incontinent  PV/NV: Legs are dusky and cool to touch  SKIN: Sacrum blanchable with Mepilex Sacral in place for prevention  LINES: PIV's saline locked  LAB/BG: , 154  OTHER: Continues on I[facility] status and COVID isolation

## 2021-12-14 NOTE — PROGRESS NOTES
Chart check-  Hemoglobin stable and improved.  Respiratory status still poor on 60 LPM high flow  Not a candidate for EGD at this this    [facility]  [phone]

## 2021-12-14 NOTE — PROVIDER NOTIFICATION
Paged on call Dr. Jo at [phone]    333- MA, J. Patient is very restless on BiPAP despite IV haldol and a sitter. He is Romansh speaking and jabber is difficult to use in COVID prec, unable to redirect. Is there another PRN we could try? Nabeel Green RN    PRN 1x dose of zyprexa ordered.

## 2021-12-15 NOTE — PROGRESS NOTES
[facility]    Hospitalist Progress Note    Brief Summary:   This is a 81-year-old male with history of diabetes mellitus type 2, rheumatoid arthritis, hyperlipidemia, gout, chronic back pain, chronic hyponatremia, who was recently admitted in the hospital from 11/07 to 11/11 for community-acquired pneumonia and acute hypoxic respiratory failure following his left total knee arthroplasty on 11/01/2021.  The patient is now coming to the ER with complaint of back pain, coughing, feeling fatigued and weak for the last couple of days  PTA     Assessment & Plan     Acute hypoxic respiratory failure, likely secondary to COVID-19 pneumonia:   Possible Bacterial Pneumonia   Acute hypoxic hypercapnic respiratory failure.   Acute Encephalopathy secondary to hypoxia and Hypercapnia.   This is a 81-year-old male,  He has been vaccinated in [place], does not remember what vaccination.  He is immunocompromised status given use of methotrexate for his rheumatoid arthritis.  He was recently tested negative COVID last month and was treated for pneumonia after his total knee arthroplasty.  He is now coming here with coughing, coughing up some phlegm, fatigue, tired, weakness, back pain.  Chest x-ray does show bibasilar airspace pulmonary opacity, left worse than right, worrisome for infection.  His COVID-19 is now positive.      Continue IV dexamethasone     IV remdesivir on hold due to bradycardia (case reports confirm possible association)    Lovenox 40 mg daily     Follow iInflammatory markers, they remain elevated     Combivent inhaler every 4 hours.      Procalcitonin elevated to 1.6 on admission    He has been on IV Zosyn since 12/9.  Repeat procalcitonin is 0.11.  Consider stopping Zosyn after 7-day course (e.g. 12/16)    CT chest PE protocol done on 12/10 negative for PE but does shows moderate to severe bilateral infiltrates.     Increased Dexamethasone to 20 mg daily for 5 days then 10 mg daily to  "complete 10 day course on 12/10 because of increasing oxygen requirement    Per Nutrition, \"Consider need for nutrition support pending respiratory status\"    I discussed nutritional needs with his granddaughter, Celeste Wellington ([phone]).  We could place a nasogastric feeding tube and begin tube feedings, but moises barrientos is restless and it seems very likely he would attempt to remove the tube and require close supervision and/or physical restraints to leave the tube in place.  Family will discuss further and we will talk again tomorrow, 12/16    We also discussed that, despite maximal medical support, he has shown little clinical improvement in the past week.  It would not be unreasonable to discuss shifting focus to emphasis on his comfort, involving palliative care, allowing family to come to the bedside where he would likely pass away.  Granddaughter will discuss this with family.      Diabetes mellitus type 2:  Medications include metformin and Jardiance.      Hold metformin.  Continue Jardiance.      Lantus 12 units every evening.      sliding scale insulin for correction and hypoglycemia protocol.      Rheumatoid arthritis:  He is on methotrexate 20 mg on Mondays.     hold methotrexate     Prednisone 5 mg b.i.d.  We will hold prednisone while he is on dexamethasone     Gout    Continue allopurinol.     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  Ranges from 129-135 at this time.     Now with mild hypernatremia    Give hypotonic fluids, D5W x500 cc    Follow electrolytes and creatinine closely    Osteoarthritis of bilateral knees:  He is status post TKA of left knee 11/01/2021.    His left knee on examination looks good.      tylenol for pain.      Dark BM concern for GI bleeding.   There was concern for possible GI bleeding given large dark stool, Patient remain hemodynamically stable and this morning Hemoglobin stable around 10.     Continue oral Protonix 40 " bid    recheck Hb every 8 hrs to see the trend, if Hb remain stable    Okay with GI top restart Lovenox    GI consult requested, cannot have EGD at this time, given respiratory failure.  I appreciate Jaqueline Bateman's follow up       DVT Prophylaxis: Enoxaparin (Lovenox) subcutaneous  Code Status: Full Code    Disposition: Expected discharge in several more days    Total time spent:  > 35 minutes  Time spent counseling, coordination of care: 25 minutes including discussion with care team and granddaughter, Celeste Mccoy, regarding oxygen needs, nutrition, goals of care personal review and interpretation of labs and studies as noted above     Polly Maloney MD  Text Page  (7am - 6pm)    Interval History   Patient is nonverbal, cannot obtain review of systems from him.  Discussed with RN, patient sometimes demonstrates oxygen desaturation with high flow nasal cannula, he has been on and off BiPAP.  He has little oral intake.  He is restless but not obviously in pain.  There are no new GI issues.    -Data reviewed today: I reviewed all new labs and imaging results over the last 24 hours.     Physical Exam   Temp: 98.8  F (37.1  C) Temp src: Axillary BP: (!) 141/77 Pulse: 109   Resp: (!) 39 (Primary RN notified) SpO2: 97 % O2 Device: BiPAP/CPAP Oxygen Delivery: 60 LPM  Vitals:    12/08/21 1128   Weight: 74.8 kg (165 lb)     Vital Signs with Ranges  Temp:  [97.8  F (36.6  C)-99.8  F (37.7  C)] 98.8  F (37.1  C)  Pulse:  [] 109  Resp:  [17-39] 39  BP: (105-158)/(66-92) 141/77  FiO2 (%):  [75 %-100 %] 95 %  SpO2:  [90 %-97 %] 97 %  I/O last 3 completed shifts:  In: 320 [P.O.:320]  Out: 450 [Urine:450]    Constitutional: Awake, eyes are open  Respiratory: tachypneic, diminished air entry bilaterally   Cardiovascular: regular rate and rhythm  GI:  Abdomen is scaphoid, there are normal bowel sounds, soft, non-distended, non-tender, no masses palpated  Skin: solar changes, especially on the neck.  Hyperpigmentation of lower  extremities consistent with chronic venous stasis.  Nails are long, dystrophic.  Musculoskeletal: no lower extremity edema   Neurologic: restless, confused.  Moves arms and legs.    Medications     - MEDICATION INSTRUCTIONS -       - MEDICATION INSTRUCTIONS -       - MEDICATION INSTRUCTIONS -         allopurinol  300 mg Oral QAM     dexamethasone  10 mg Intravenous Q24H     empagliflozin  10 mg Oral Daily     enoxaparin ANTICOAGULANT  40 mg Subcutaneous Q24H     haloperidol lactate  2.5 mg Intravenous Once     insulin aspart  1-7 Units Subcutaneous TID AC     insulin aspart  1-5 Units Subcutaneous At Bedtime     insulin glargine  12 Units Subcutaneous At Bedtime     ipratropium-albuterol  2 puff Inhalation 4x Daily     latanoprost  1 drop Both Eyes At Bedtime     multivitamin w/minerals  1 tablet Oral Daily     pantoprazole  40 mg Oral BID AC     piperacillin-tazobactam  3.375 g Intravenous Q6H     polyethylene glycol  17 g Oral Daily     QUEtiapine  50 mg Oral At Bedtime     senna-docusate  1 tablet Oral BID     sodium chloride (PF)  3 mL Intracatheter Q8H       Data   Recent Labs   Lab 12/15/21  0551 12/14/21  2116 12/14/21  1615 12/14/21  0732 12/14/21  0610 12/13/21  1134 12/13/21  1132 12/12/21  0816 12/12/21  0548 12/11/21  0528 12/11/21  0523 12/08/21  1847 12/08/21  1313   WBC 3.0*  --   --   --  5.6  --   --   --  2.2*   < >  --    < >  --    HGB 12.8*  --   --   --  12.3*  --  12.4*   < > 10.0*   < >  --    < >  --    MCV 99  --   --   --  99  --   --   --  101*   < >  --    < >  --    PLT 79*  --   --   --  139*  --   --   --  206   < >  --    < >  --    *  --   --   --  146*  --  143  --  141  --  139   < > 129*   POTASSIUM 3.5  --   --   --  3.9  --  3.6  --  3.9  --  4.5   < > 4.2   CHLORIDE 112*  --   --   --  112*  --  110*  --  111*  --  110*   < > 101   CO2 26  --   --   --  27  --  28  --  24  --  23   < > 22   BUN 42*  --   --   --  37*  --  40*  --  41*  --  36*   < > 40*   CR 0.82  --    --   --  0.72  --  0.82  --  0.81  --  0.79   < > 1.23   ANIONGAP 8  --   --   --  7  --  5  --  6  --  6   < > 6   BRADY 9.8  --   --   --  10.2*  --  10.6*  --  9.6  --  9.6   < > 8.4*   * 244* 231*   < > 137*   < > 131*   < > 184*   < > 190*   < > 152*   ALBUMIN  --   --   --   --   --   --  3.0*  --   --   --  2.3*  --  2.4*   PROTTOTAL  --   --   --   --   --   --   --   --   --   --   --   --  6.4*   BILITOTAL  --   --   --   --   --   --   --   --   --   --   --   --  0.3   ALKPHOS  --   --   --   --   --   --   --   --   --   --   --   --  102   ALT  --   --   --   --   --   --   --   --   --   --   --   --  33   AST  --   --   --   --   --   --   --   --   --   --   --   --  36    < > = values in this interval not displayed.       No results found for this or any previous visit (from the past 24 hour(s)).

## 2021-12-15 NOTE — PROGRESS NOTES
MD Notification    Notified Person: White    Notification Date/Time: 12/15/2021 1:18 AM     Notification Interaction: page    Purpose of Notification: pt requiring restraints d/t pulling lines, bipap, tele, currently on bipap, sit in room, can we redo the order for restraints?     Orders Received: orders for restraints received

## 2021-12-15 NOTE — PLAN OF CARE
Admitted 12/8, covid +  Neuro: Confused, disorientated to time, place, situation   Required restraints around 0100 for pulling at lines, bipap, tele leads, order obtained per MD  CV/Rhythm: sinus tach  Resp/02: on 100 fiO2 and 60 L on HFNC, on bipap overnight at 95%  GI/Diet: Regular, NPO overnight for bipap  : incontinent   Skin/Incisions/Sites: intact  Pulses/CMS: WNL  Edema: none noted   Activity/Falls Risk: not OOB, able to reposition independently   Lines/Drains/IVs: PIV R upper arm, PIV L AC  VS/Pain: VSS, denies pain

## 2021-12-15 NOTE — PLAN OF CARE
12/15 [id number]  A&O to self at times, confused. Lethargic / restless. Congolese speaking. Remains on BiPAP.  Attempts to move out of bed, not OOB this shift. Weight shifting and turning. Unable to verbalize pain. NPO. Continent mostly, points to use urinal. , 165, 212. Na 146, , WBC 3, Plt 79. Tele ST. Scattered bruising. PIV SL. Receiving Decadron, Zosyn, and Lovenox. Pending improvement, MD updated family.

## 2021-12-16 NOTE — PLAN OF CARE
Summary: 1900 - 0730    Cognition/Mentation: Alert, difficult to assess orientation level d/t language barrier; restlessness/agitation at times    Neuro/CMS: hands & feet cool, improved with warm blankets    VS: tachy, HR up to 130s, MD notified & IVF bolus given & rate decreased to 100s, remains on bipap    Cardiac: ST    Respiratory: DAVALOS, LS diminished, infrequent cough    GI/: urinal, incontinent at times, no BM    Activity: Sat at edge of bed with 2 staff assist    Drains/Tubes: PIV SL    Skin: intact    Other: Restraints removed at 0125 as patient calmer & responding to redirection

## 2021-12-16 NOTE — PROGRESS NOTES
[facility]    Hospitalist Progress Note    Brief Summary:   This is a 81-year-old male with history of diabetes mellitus type 2, rheumatoid arthritis, hyperlipidemia, gout, chronic back pain, chronic hyponatremia, who was recently admitted in the hospital from 11/07 to 11/11 for community-acquired pneumonia and acute hypoxic respiratory failure following his left total knee arthroplasty on 11/01/2021.  The patient is now coming to the ER with complaint of back pain, coughing, feeling fatigued and weak for the last couple of days  PTA     Assessment & Plan     Acute hypoxic respiratory failure, likely secondary to COVID-19 pneumonia:   Possible Bacterial Pneumonia   Acute hypoxic hypercapnic respiratory failure.   Acute Encephalopathy secondary to hypoxia and Hypercapnia.   This is a 81-year-old male,  He has been vaccinated in [place], does not remember what vaccination.  He is immunocompromised status given use of methotrexate for his rheumatoid arthritis.  He was recently tested negative COVID last month and was treated for pneumonia after his total knee arthroplasty.  He is now coming here with coughing, coughing up some phlegm, fatigue, tired, weakness, back pain.  Chest x-ray does show bibasilar airspace pulmonary opacity, left worse than right, worrisome for infection.  His COVID-19 is now positive.      IV remdesivir on hold due to bradycardia (case reports confirm possible association)    Has been on Lovenox 40 mg daily BUT now with thrombocytopenia, see below.      Follow iInflammatory markers, they remain elevated, CRP is rising    Combivent inhaler every 4 hours.      Procalcitonin elevated to 1.6 on admission    He has been on IV Zosyn since 12/9.  Repeat procalcitonin is 0.11.  Discontinued Zosyn after 7-day course    CT chest PE protocol done on 12/10 negative for PE but does shows moderate to severe bilateral infiltrates.     Increased Dexamethasone to 20 mg daily for 5 days  "then 10 mg daily to complete 10 day course because of increasing oxygen requirement    Tocilizumab is not indicated in patients who are immunocompromised (he has RA and is on methotrexate) and this late in the hospital course, per [employer], \"We generally reserve tocilizumab for those who are within 96 hours of hospitalization or within 24 to 48 hours of initiation of ICU-level care, similar to the study population in the large trials.\"    Per Nutrition, \"Consider need for nutrition support pending respiratory status\"    I discussed nutritional needs with his granddaughter, Celeste Wellington ([phone]).  We could place a nasogastric feeding tube and begin tube feedings, but hold is a is restless and it seems very likely he would attempt to remove the tube and require close supervision and/or physical restraints to leave the tube in place.  Family will discuss further and we will talk again     We also discussed that, despite maximal medical support, he has shown little clinical improvement in the past week.  It would not be unreasonable to discuss shifting focus to emphasis on his comfort, involving palliative care, allowing family to come to the bedside where he would likely pass away.  Granddaughter will discuss this with family.    Thrombocytopenia  Leukpenia    Now with leukopenia, thrombocytopenia    Put Lovenox on hold    4T score is 6, due to drop in platelet count of > 50%, onset 5-10 days after beginning Lovenox, no obvious alternative cause for thrombocytopenia    Check HIT labs    HIT order set advises, \"IF Intermediate or High Probability HIT, stop heparin and immediately start alternative anticoagulant.    Added Fondaparinux    Diabetes mellitus type 2:  Medications include metformin and Jardiance.      Hold metformin.  Continue Jardiance.      Lantus 12 units every evening.      sliding scale insulin for correction and hypoglycemia protocol.      Rheumatoid arthritis:  He is on methotrexate 20 mg on " Mondays.     hold methotrexate     Prednisone 5 mg b.i.d.  We will hold prednisone while he is on dexamethasone     Gout    Continue allopurinol.     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  Ranges from 129-135 at this time.     Now with mild hypernatremia    Give hypotonic fluids, D5W x500 cc    Follow electrolytes and creatinine closely    Osteoarthritis of bilateral knees:  He is status post TKA of left knee 11/01/2021.    His left knee on examination looks good.      tylenol for pain.      Dark BM concern for GI bleeding.   There was concern for possible GI bleeding given large dark stool, Patient remain hemodynamically stable and this morning Hemoglobin stable around 10.     Continue oral Protonix 40 bid    recheck Hb every 8 hrs to see the trend, if Hb remain stable    Okay with GI top restart Lovenox    GI consult requested, cannot have EGD at this time, given respiratory failure.  I appreciate Jaqueline Bateman's follow up       DVT Prophylaxis: Enoxaparin (Lovenox) subcutaneous  Code Status: Full Code    Disposition: Expected discharge in several more days    Total time spent:  > 35 minutes  Time spent counseling, coordination of care: 25 minutes including discussion with care team and left message for granddaughter, Celeste Mccoy ([phone]), also PharmD regarding oxygen needs, nutrition, thrombocytopenia with possible HIT, personal review and interpretation of labs and studies as noted above     Polly Maloney MD  Text Page  (7am - 6pm)    Interval History   Patient is nonverbal, cannot obtain review of systems from him.  Discussed with RN, attendant at bedside.  He is calmer today.      -Data reviewed today: I reviewed all new labs and imaging results over the last 24 hours.     Physical Exam   Temp: 98.7  F (37.1  C) Temp src: Axillary BP: 126/63 Pulse: (!) 126   Resp: (!) 33 SpO2: 91 % O2 Device: BiPAP/CPAP    Vitals:    12/08/21 1128 12/16/21 0640    Weight: 74.8 kg (165 lb) 58.5 kg (128 lb 15.5 oz)     Vital Signs with Ranges  Temp:  [97.5  F (36.4  C)-99.9  F (37.7  C)] 98.7  F (37.1  C)  Pulse:  [] 126  Resp:  [17-36] 33  BP: (113-143)/(63-88) 126/63  FiO2 (%):  [90 %-95 %] 95 %  SpO2:  [91 %-97 %] 91 %  I/O last 3 completed shifts:  In: -   Out: 475 [Urine:475]    Constitutional: Awake, eyes are open, spoke (cannot understand his remarks due to noise of air filter, biPAP)  Respiratory: work of breathing is less today  Cardiovascular: regular rate and rhythm  GI:  Abdomen is scaphoid, there are normal bowel sounds, soft, non-distended, non-tender, no masses palpated  Skin: solar changes, especially on the neck.  Hyperpigmentation of lower extremities consistent with chronic venous stasis.  Nails are long, dystrophic.  Musculoskeletal: no lower extremity edema.  Scar overlying left knee c/w left TKA  Neurologic: Moves arms and legs.    Medications     - MEDICATION INSTRUCTIONS -       - MEDICATION INSTRUCTIONS -       - MEDICATION INSTRUCTIONS -         allopurinol  300 mg Oral QAM     dexamethasone  10 mg Intravenous Q24H     empagliflozin  10 mg Oral Daily     enoxaparin ANTICOAGULANT  40 mg Subcutaneous Q24H     haloperidol lactate  2.5 mg Intravenous Once     insulin aspart  1-7 Units Subcutaneous TID AC     insulin aspart  1-5 Units Subcutaneous At Bedtime     insulin glargine  12 Units Subcutaneous At Bedtime     ipratropium-albuterol  2 puff Inhalation 4x Daily     latanoprost  1 drop Both Eyes At Bedtime     multivitamin w/minerals  1 tablet Oral Daily     pantoprazole  40 mg Oral BID AC     polyethylene glycol  17 g Oral Daily     QUEtiapine  50 mg Oral At Bedtime     senna-docusate  1 tablet Oral BID     sodium chloride (PF)  3 mL Intracatheter Q8H     sodium phosphate  15 mmol Intravenous Once       Data   Recent Labs   Lab 12/16/21  0821 12/16/21  0604 12/16/21  0314 12/15/21  1001 12/15/21  0551 12/14/21  0732 12/14/21  0610 12/13/21  1134  12/13/21  1132 12/11/21  0528 12/11/21  0523   WBC  --  1.6*  --   --  3.0*  --  5.6  --   --    < >  --    HGB  --  12.2*  --   --  12.8*  --  12.3*  --  12.4*   < >  --    MCV  --  97  --   --  99  --  99  --   --    < >  --    PLT  --  57*  --   --  79*  --  139*  --   --    < >  --    NA  --  147*  --   --  146*  --  146*  --  143   < > 139   POTASSIUM  --  3.6  --   --  3.5  --  3.9  --  3.6   < > 4.5   CHLORIDE  --  117*  --   --  112*  --  112*  --  110*   < > 110*   CO2  --  27  --   --  26  --  27  --  28   < > 23   BUN  --  38*  --   --  42*  --  37*  --  40*   < > 36*   CR  --  0.79  --   --  0.82  --  0.72  --  0.82   < > 0.79   ANIONGAP  --  3  --   --  8  --  7  --  5   < > 6   BRADY  --  9.5  --   --  9.8  --  10.2*  --  10.6*   < > 9.6   * 281* 142*   < > 167*   < > 137*   < > 131*   < > 190*   ALBUMIN  --   --   --   --   --   --   --   --  3.0*  --  2.3*    < > = values in this interval not displayed.       No results found for this or any previous visit (from the past 24 hour(s)).

## 2021-12-16 NOTE — TELEPHONE ENCOUNTER
No C2C on file     Sent Thotz response message back     Rita WOODS, Triage RN  [facility]

## 2021-12-16 NOTE — PROVIDER NOTIFICATION
MD Notification    Notified Person: MD    Notified Person Name: Dr. Mckeon    Notification Date/Time: 12/16/21, 3:28 AM     Notification Interaction: Am[employer]    Purpose of Notification: HR sustaining 120s-130s, /82, pt slightly restless but generally calm in bed    Orders Received: IVF bolus    Comments:

## 2021-12-16 NOTE — TELEPHONE ENCOUNTER
Reason for Call:  Other call back    Detailed comments: Please call Antonieta (Daughter) at [phone] regarding health of patient--family has questions. [phone].  is needed for this call.     Phone Number Patient can be reached at: Cell number on file:    Telephone Information:   Mobile [phone]           Best Time: any    Can we leave a detailed message on this number? YES    Call taken on 12/16/2021 at 10:59 AM by Colt Higuera

## 2021-12-16 NOTE — PROVIDER NOTIFICATION
Hospitalist Ankit Cover  12/16/2021    Notified of sustained sinus tachycardia in 120-130 range. Poor nutrition and is NPO due to need for bipap. Not febrile. Chart reviewed. Labs with hyperchloremic hypernatremia.     BP (!) 141/81   Pulse (!) 130   Temp 99  F (37.2  C) (Axillary)   Resp 26   Wt 74.8 kg (165 lb)   SpO2 93%   BMI 23.01 kg/m      Plan: Suspect gradual dehydration given poor oral intake and free water deficit. Give 1 L bolus D5 1/2 NS over 2 hours and reassess.     Discussed with bedside nurse.    Mena Mckeon MD

## 2021-12-16 NOTE — TELEPHONE ENCOUNTER
Bhutanese speaking patient.   He is admitted in the hospital. Family should talk to the doctors in house to get information on his current health status. I do not see patients in the hospital.

## 2021-12-16 NOTE — ADDENDUM NOTE
Addendum  created 12/16/21 1010 by Ulises Samano DO    Attestation recorded in Intraprocedure, Intraprocedure Attestations deleted, Intraprocedure Attestations filed

## 2021-12-17 NOTE — PROGRESS NOTES
Critical Care Progress Note      12/17/2021    Name: Stephen Valerio MRN#: [id number]   Age: 81 year old YOB: 1940     Hsptl Day# 9  ICU DAY #1    MV DAY #0             Problem List:   Active Problems:    Hypoxia    Infection due to 2019 novel coronavirus           Summary/Hospital Course:   -  This is a 81-year-old male,  He has been vaccinated in [place], does not remember what vaccination.  He is immunocompromised status given use of methotrexate for his rheumatoid arthritis.  He was recently tested negative COVID last month and was treated for pneumonia after his total knee arthroplasty.     -He was noted to be covid positive. On 12/9 and has been admitted since than. He had stayed on BPAP for past 8 days without any significant improvement.   -He is being transferred to ICU for worsening oxygenation.       Assessment and plan :     Stephen Valerio IS a 81 year old male admitted on 12/8/2021 for severe covid associated ARDS.   I have personally reviewed the daily labs, imaging studies, cultures and discussed the case with referring physician and consulting physicians.     My assessment and plan by system for this patient is as follows:    Neurology/Psychiatry:   -Delirium and anxiety secondary to respiratory distress and hypoxemia. Started on precedex drip.     Cardiovascular:   -Sinus tachycardia    Pulmonary/Ventilator Management:   severe covid associated ARDS currently on BPAP.  Severe hypoxemic respiratroy failure. He will require intubation.   -Discussed with grand daughter she is talking with rest of the family memebrs and may be leaning towards comfort care. Patient has had sevre hypoxemai for past few days and has continued to worsen suggesting possible fibrous phase.. He will have poor outcome even if he is intuabted secondary to his age ,severity of disease  And being immunocompromised.   -we will continue to monitor.     GI and Nutrition :   -NPO for  now    Renal/Fluids/Electrolytes:   -Hypernatremia.  -Closely monitoring.     Infectious Disease:   Covid 19 infection  -No tocilizumab because of being immunosuppressed on Methotrexate.  dexamethasone is ordered.     -Finished 7 days of zosybn on 12/16.  -depending on goals of care may broaden his antibiotics again.     Endocrine:   -ISS  -glargine 12 units.     Hematology/Oncology:     Thrombocytopenia  Leukpenia  ? Now with leukopenia, thrombocytopenia  ? 4T score is 6, due to drop in platelet count of > 50%, onset 5-10 days after beginning Lovenox, no obvious alternative cause for thrombocytopenia  ? Check HIT labs  ? Fondaparinux        MSKL/Rheum:    Rheumatoid arthritis:  He is on methotrexate 20 mg on Mondays.   -Prednisone 5 BID.  Held as of now.     Gout  ? Continue allopurinol    Osteoarthritis of bilateral knees:  He is status post TKA of left knee 11/01/2021.  ? His left knee on examination looks good.      IV/Access:   1. Venous access - PIV      ICU Prophylaxis:   1. DVT:  Arixtra  2. Stress Ulcer: PPI  3. Restraints: Nonviolent soft two point restraints required and necessary for patient safety and continued cares and good effect as patient continues to pull at necessary lines, tubes despite education and distraction. Will readdress daily.   4. Feeding -  Npo   5. Family Update:grand daughter is communicating discussing ion goals of care.   6. Disposition - ICU             Key Medications:       allopurinol  300 mg Oral QAM     dexamethasone  10 mg Intravenous Q24H     empagliflozin  10 mg Oral Daily     fondaparinux ANTICOAGULANT  7.5 mg Subcutaneous Daily     haloperidol lactate  2.5 mg Intravenous Once     insulin aspart  1-7 Units Subcutaneous TID AC     insulin aspart  1-5 Units Subcutaneous At Bedtime     insulin glargine  12 Units Subcutaneous At Bedtime     ipratropium-albuterol  2 puff Inhalation 4x Daily     latanoprost  1 drop Both Eyes At Bedtime     multivitamin w/minerals  1 tablet Oral  Daily     pantoprazole  40 mg Oral BID AC     polyethylene glycol  17 g Oral Daily     QUEtiapine  50 mg Oral At Bedtime     senna-docusate  1 tablet Oral BID     sodium chloride (PF)  3 mL Intracatheter Q8H       dexmedetomidine 0.2 mcg/kg/hr (12/17/21 0519)     - MEDICATION INSTRUCTIONS -       - MEDICATION INSTRUCTIONS -       - MEDICATION INSTRUCTIONS -                 Physical Examination:   Temp:  [97.5  F (36.4  C)-99.9  F (37.7  C)] 98.2  F (36.8  C)  Pulse:  [] 131  Resp:  [16-36] 25  BP: ()/(62-86) 114/68  FiO2 (%):  [95 %-100 %] 100 %  SpO2:  [90 %-98 %] 93 %    Intake/Output Summary (Last 24 hours) at 12/17/2021 0533  Last data filed at 12/16/2021 1258  Gross per 24 hour   Intake --   Output 100 ml   Net -100 ml     Wt Readings from Last 4 Encounters:   12/16/21 58.5 kg (128 lb 15.5 oz)   11/07/21 70.7 kg (155 lb 13.8 oz)   11/05/21 73.9 kg (163 lb)   11/01/21 74 kg (163 lb 2.3 oz)     BP - Mean:  [75] 75  FiO2 (%): 100 %  Resp: 25    Recent Labs   Lab 12/17/21  0245 12/15/21  2342 12/11/21  0822 12/10/21  0951   PH 7.46*  --  7.44  --    PCO2 35  --  37  --    PO2 52*  --  88  --    HCO3 25  --  25  --    O2PER 100 95 75 100       GEN: Severe respiratory distress on BPAP.   HEENT: head ncat, sclera anicteric, OP patent, trachea midline   PULM: Labored breathing   CV/COR: RRR S1S2 no gallop,  No rub, no murmur  ABD: soft nontender, hypoactive bowel sounds, no mass  EXT:  Edema   warm  NEURO:  Confused.  SKIN: no obvious rash  LINES: clean, dry intact         Data:   All data and imaging reviewed     ROUTINE ICU LABS (Last four results)  CMP  Recent Labs   Lab 12/17/21  0513 12/16/21 2120 12/16/21  1923 12/16/21  1804 12/16/21  1151 12/16/21  0821 12/16/21  0604 12/15/21  1001 12/15/21  0551 12/14/21  0732 12/14/21  0610 12/13/21  1134 12/13/21  1132 12/11/21  0850 12/11/21  0523   NA  --   --   --   --   --   --  147*  --  146*  --  146*  --  143   < > 139   POTASSIUM  --   --   --   --   --    --  3.6  --  3.5  --  3.9  --  3.6   < > 4.5   CHLORIDE  --   --   --   --   --   --  117*  --  112*  --  112*  --  110*   < > 110*   CO2  --   --   --   --   --   --  27  --  26  --  27  --  28   < > 23   ANIONGAP  --   --   --   --   --   --  3  --  8  --  7  --  5   < > 6   GLC 96 190*  --  198* 159*   < > 281*   < > 167*   < > 137*   < > 131*   < > 190*   BUN  --   --   --   --   --   --  38*  --  42*  --  37*  --  40*   < > 36*   CR  --   --   --   --   --   --  0.79  --  0.82  --  0.72  --  0.82   < > 0.79   GFRESTIMATED  --   --   --   --   --   --  85  --  84  --  89  --  84   < > 85   BRADY  --   --   --   --   --   --  9.5  --  9.8  --  10.2*  --  10.6*   < > 9.6   MAG  --   --   --   --   --   --  1.9  --  2.2  --  2.1  --  2.1   < > 2.1   PHOS  --   --  2.7  --   --   --  1.1*  --  3.3  --  2.0*   < > 1.4*  1.4*   < > 3.8   ALBUMIN  --   --   --   --   --   --   --   --   --   --   --   --  3.0*  --  2.3*    < > = values in this interval not displayed.     CBC  Recent Labs   Lab 12/16/21  0604 12/15/21  0551 12/14/21  0610 12/13/21  1132 12/12/21  1913 12/12/21  0548   WBC 1.6* 3.0* 5.6  --   --  2.2*   RBC 4.03* 4.23* 4.09*  --   --  3.33*   HGB 12.2* 12.8* 12.3* 12.4*   < > 10.0*   HCT 39.0* 41.8 40.5  --   --  33.6*   MCV 97 99 99  --   --  101*   MCH 30.3 30.3 30.1  --   --  30.0   MCHC 31.3* 30.6* 30.4*  --   --  29.8*   RDW 14.6 14.5 14.5  --   --  14.8   PLT 57* 79* 139*  --   --  206    < > = values in this interval not displayed.     INRNo lab results found in last 7 days.  Arterial Blood Gas  Recent Labs   Lab 12/17/21  0245 12/15/21  2342 12/11/21  0822 12/10/21  0951   PH 7.46*  --  7.44  --    PCO2 35  --  37  --    PO2 52*  --  88  --    HCO3 25  --  25  --    O2PER 100 95 75 100       All cultures:  No results for input(s): CULT in the last 168 hours.  Recent Results (from the past 24 hour(s))   XR Chest Port 1 View    Narrative    EXAM: XR CHEST PORT 1 VIEW  LOCATION: [facility]  DATE/TIME: 12/17/2021 1:36 AM    INDICATION: COVID +, worsening resp status  COMPARISON: 12/10/2021      Impression    IMPRESSION: Increasing airspace infiltrates especially involving left lung consistent with worsening pneumonia. Heart size remains normal.         Billing: This patient is critically ill: Yes. Total critical care time today 45 min.

## 2021-12-17 NOTE — SIGNIFICANT EVENT
SPIRITUAL HEALTH SERVICES Significant Event  [facility] Sacrament of ANOINTING  [facility] UNIT ICU    Pt anointed by Father Justino Champagne of anointing corps per request of family.    Florina Davila M.Div.  Associate   Pager: [phone]

## 2021-12-17 NOTE — PROGRESS NOTES
Noted patient has changed to comfort care status.  Will be available if more aggressive nutrition intervention desired.  Nelsy Villarreal RD, LD, CNSC   Clinical Dietitian - [facility]

## 2021-12-17 NOTE — PLAN OF CARE
Difficult to assess orientation due to language barrier and BiPAP on, alert and opens eyes spontaneously, lethargic at times. Restless at times. Tachycardic and tachypnic at times, other VSS on BiPAP 95% and 12/6. Did try HFNC 100% and 60 LPM but only could tolerate 2 hours, now abck on BiPAP 100% and 16/8. Tele sinus tach. LS diminished. Incontinent at times, can use urinal. PIV SL. Skin intact. No nonverbal indicators of pain. Bedrest, can move self in bed. NPO since on bipap. Phos low today 1.1, replaced IV and recheck at 1900. Set up iPad for family in room to call and talk to him. Updated Celeste throughout the day. Per Celeste at 1820, they would like to try a feeing tube, message placed in provider sticky notes to address tomorrow morning. Sitter at bedside.

## 2021-12-17 NOTE — PROGRESS NOTES
House VAN brief note:    Paged by nursing as pt's RR 30s-40s, O2 sats upper 80s-low 90s% despite 100% 16/8 Bipap support.  Nursing notes pt also restless tonight, recently administered haldol IV.  Will order stat ABG and CXR (last imaging on 12/10).  Will also have one time PRN morphine available if haldol does not improve pt's restlessness (?air hunger).  Pt continues on decadron.  Will evaluate pt.  Noted pt received 1L IVF bolus overnight.  Pending pt's appearance, may consider IV diuretic.     Addendum: 0130: Presented to pt's bedside.  Pt restless in bed (bedside attendant at bedside), HR low 140s, RR 30s-40s, pulling  + ml, O2 sats 79-90%, afebrile.  Pt's lung sounds clear throughout, on Bipap 100%, 16/10.  Pt with mottling noted on anterior thighs.  Nursing notes pt incontinent of urine, has had 2 wet briefs tonight.  Attempted to call pt's granddaughterCeleste, 3 times, no answer.  At that time, discussed pt's clinical status with Dr. Samuels, intensivist.  Dr. Samuels presented to pt's bedside to evaluate pt as well.  Dr. Samuels adjusted Bipap settings to 17/12.  Pt to transfer to ICU for intubation once room available (2 pts have to be moved prior to transfer). Will remain available should pt further decompensate and require emergent intubation prior to transfer.  Greatly appreciate intensivist's expertise and further management of critically ill pt.  Attempted to contact pt's granddaughter, Celeste, once again, no answer, left voicemail to call ICU for an update.          Yves Lima, JASMIN, CNP  House VAN     I spent 20 min at pt's bedside, on unit and in discussion with consultants managing and coordinating pt's overall plan of care.

## 2021-12-17 NOTE — PLAN OF CARE
Pt transferred to ICU 0500. Bipap 100% 17/12.  Restless, precedex started but now on hold. Tachycardic. A lot of effort breathing on the bipap. Skin grossly intact but lower extremities mottling. Family contacted multiple times to determine plan of care. Still making decisions at change of shift. Full code pending family decision.

## 2021-12-17 NOTE — PLAN OF CARE
Patient on 100% BIPAP and progressively more tachycardic into the the 140's. House doctor paged regarding worsening oxygen saturations, please see note for update. Alert and oriented but lethargic. Lung sounds diminished. Bowel sounds hypoactive, flatus negative. Patient denying pain. Assist of 2 with lift. Lower extremities mottled with +1 lower extremity pulses. Incontinent of urine. Turn and repo as oxygen tolerates. Tele sinus tachycardic.

## 2021-12-17 NOTE — PROGRESS NOTES
SPIRITUAL HEALTH SERVICES  SPIRITUAL ASSESSMENT Progress Note  [facility] ICU    REFERRAL SOURCE: Family request    Family requested spiritual and emotional support and the sacrament of anointing for Stephen after transitioning to comfort cares. I reached out to ALEX estrada; FrVani Champagne will be here to anoint around 11:45.    Antonieta shared how difficult it is to be physically  from Stephen, and shared that he called her on her recent birthday, which was also the Feast Day of [facility] [employer]. Howie and Antonieta confirmed that [facility] [employer] is important to their family; I offered to print off a illustration of her for Stephen, which they welcomed.    I delivered rosaries and Chinese language New Testament/Psalms booklet to Antonieta and Stephen, and coordinated delivery of Our lady illustration and rosary for Stephen with bedside nurse Luisa.    I provided emotional, spiritual, end of life support, and culturally sensitive spiritual care.    PLAN: I will continue to follow throughout the day. Spiritual Health Services remains available for patient, family, and staff support.     Please page the on call  by placing a STAT or ASAP Epic referral for Spiritual Health (which will roll to the on call pager).        MARCO ANTONIO Hunt.   Associate   Pager [phone]

## 2021-12-17 NOTE — PROGRESS NOTES
[facility]    Hospitalist Progress Note    Brief Summary:   This is a 81-year-old male with history of diabetes mellitus type 2, rheumatoid arthritis, hyperlipidemia, gout, chronic back pain, chronic hyponatremia, who was recently admitted in the hospital from 11/07 to 11/11 for community-acquired pneumonia and acute hypoxic respiratory failure following his left total knee arthroplasty on 11/01/2021.  The patient is now coming to the ER with complaint of back pain, coughing, feeling fatigued and weak for the last couple of days  PTA     Assessment & Plan     Acute hypoxic respiratory failure, likely secondary to COVID-19 pneumonia:   Possible Bacterial Pneumonia   Acute hypoxic hypercapnic respiratory failure.   Acute Encephalopathy secondary to hypoxia and Hypercapnia.   This is a 81-year-old male,  He has been vaccinated in [place], does not remember what vaccination.  He is immunocompromised status given use of methotrexate for his rheumatoid arthritis.  He was recently tested negative COVID last month and was treated for pneumonia after his total knee arthroplasty.  He is now coming here with coughing, coughing up some phlegm, fatigue, tired, weakness, back pain.  Chest x-ray does show bibasilar airspace pulmonary opacity, left worse than right, worrisome for infection.  His COVID-19 is now positive.      He has been on IV dexamethasone     IV remdesivir was held due to bradycardia (case reports confirm possible association)    Had been on Lovenox 40 mg daily, developed severe thrombocytopenia, HIT labs pending    inflammatory markers remain very elevated and continue to increase    oxygen needs have increased despite maximal medical care since 12/8.  He is failing.    Was on IV Zosyn 12/9-12/16    CT chest PE protocol done on 12/10 negative for PE but does shows moderate to severe bilateral infiltrates.     Increased Dexamethasone to 20 mg daily for 5 days then 10 mg daily because of  "increasing oxygen requirement    At 0130 12/17, VAN was called, please see her note,\"Pt restless in bed (bedside attendant at bedside), HR low 140s, RR 30s-40s, pulling  + ml, O2 sats 79-90%, afebrile.  Pt's lung sounds clear throughout, on Bipap 100%, 16/10.  Pt with mottling noted on anterior thighs.\"  He was transferred to ICU    See Dr. Heaton's note, \"After long discussion initially by phone and then via IPAD at bedside, family chose comfort care on his behalf. He is DNR and orders are written. Children are coming to see him through the door in ICU. We have counseled against going into room as they would need to quarantine at home for 14 days despite their being vaccinated. Narcotics ordered for him, and will plan to withdraw aggressive measures once family has a chance to come to visit.\"    I again discussed with granddaughter, Celeste.  She says, \"my family is there and he is responding to them.\"  We discussed that this is ideal, he is experiencing comfort from having family at bedside.  His response does NOT imply that he can recover from COVID    Continue comfort approach, remove biPAP and allow mouth cares, sips as able, family can interact with patient without biPAP mask    Transfer out of ICU      Diabetes mellitus type 2    Discontinue blood glucose checks, insulin, since emphasis is on his comfort    Rheumatoid arthritis:  He is on methotrexate 20 mg on Mondays.     hold methotrexate, Prednisone due to emphasis on comfort      Gout  Hyponatremia  Hypernatremia  Osteoarthritis of bilateral knees:  He is status post TKA of left knee 11/01/2021.       DVT Prophylaxis:none, due to comfort emphasis  Code Status: No CPR- Do NOT Intubate    Disposition: Expected discharge in several more days    Total time spent:  > 35 minutes  Time spent counseling, coordination of care: 25 minutes including discussion with care team and granddaughter, Celeste Darius, also Dr. Heaton, regarding worsening respiratory " failure, comfort emphasis    Polly Maloney MD  Hospitalist  [facility]  Text Page (7am - 6pm, M-F)      Interval History   Patient is nonverbal, cannot obtain review of systems from him.  Discussed with RN, Saundra, also patient's daughter.  He moves arms, legs, seems to respond to family.      -Data reviewed today: I reviewed all new labs and imaging results over the last 24 hours.     Physical Exam   Temp: 98.4  F (36.9  C) Temp src: Axillary BP: (!) 78/49 Pulse: 104   Resp: 19 SpO2: 92 % O2 Device: BiPAP/CPAP    Vitals:    12/08/21 1128 12/16/21 0640   Weight: 74.8 kg (165 lb) 58.5 kg (128 lb 15.5 oz)     Vital Signs with Ranges  Temp:  [97.9  F (36.6  C)-98.4  F (36.9  C)] 98.4  F (36.9  C)  Pulse:  [] 104  Resp:  [16-46] 19  BP: ()/(25-97) 78/49  FiO2 (%):  [100 %] 100 %  SpO2:  [72 %-98 %] 92 %  No intake/output data recorded.    Constitutional: obtunded, kept eyes closed throughout our interview  Respiratory: breath sounds are difficult to hear over noise of biPAP   Cardiovascular: tachycardic, regular rate and rhythm  GI:  Abdomen is scaphoid, there are normal bowel sounds, soft, non-distended, non-tender, no masses palpated  Skin: solar changes, especially on the neck.  Hyperpigmentation of lower extremities consistent with chronic venous stasis.   Musculoskeletal: no lower extremity edema.  Arms and legs are cool.  Neurologic: randomly moves arms/legs    Medications     - MEDICATION INSTRUCTIONS -         glycopyrrolate  0.2 mg Intravenous Once     sodium chloride (PF)  3 mL Intracatheter Q8H       Data   Recent Labs   Lab 12/17/21  0624 12/17/21  0513 12/16/21  2120 12/16/21  0821 12/16/21  0604 12/15/21  1001 12/15/21  0551 12/13/21  1134 12/13/21  1132 12/11/21  0528 12/11/21  0523   WBC 0.9*  --   --   --  1.6*  --  3.0*   < >  --    < >  --    HGB 13.2*  --   --   --  12.2*  --  12.8*   < > 12.4*   < >  --    *  --   --   --  97  --  99   < >  --    < >  --     *  --   --   --  57*  --  79*   < >  --    < >  --    *  --   --   --  147*  --  146*   < > 143   < > 139   POTASSIUM 3.4  --   --   --  3.6  --  3.5   < > 3.6   < > 4.5   CHLORIDE 119*  --   --   --  117*  --  112*   < > 110*   < > 110*   CO2 24  --   --   --  27  --  26   < > 28   < > 23   BUN 51*  --   --   --  38*  --  42*   < > 40*   < > 36*   CR 1.08  --   --   --  0.79  --  0.82   < > 0.82   < > 0.79   ANIONGAP 9  --   --   --  3  --  8   < > 5   < > 6   BRADY 10.4*  --   --   --  9.5  --  9.8   < > 10.6*   < > 9.6   * 96 190*   < > 281*   < > 167*   < > 131*   < > 190*   ALBUMIN  --   --   --   --   --   --   --   --  3.0*  --  2.3*    < > = values in this interval not displayed.       Recent Results (from the past 24 hour(s))   XR Chest Port 1 View    Narrative    EXAM: XR CHEST PORT 1 VIEW  LOCATION: [facility]  DATE/TIME: 12/17/2021 1:36 AM    INDICATION: COVID +, worsening resp status  COMPARISON: 12/10/2021      Impression    IMPRESSION: Increasing airspace infiltrates especially involving left lung consistent with worsening pneumonia. Heart size remains normal.

## 2021-12-17 NOTE — PROGRESS NOTES
Intensivist note  Patient's case reviewed and discussed with Dr. Samuels at sign out.     He has developed worsening acute respiratory failure due to COVID-19 lung disease. Given underlying RA and immunosuppressant regimen along with severe lung disease, his prognosis now is grim.     I had several conversations with grand daughter who speaks english via phone, and grand son via ipad in room. The patient has 3 siblings Frisian speaking only in [place] and the rest are in [place]. After long discussion initially by phone and then via IPAD at bedside, family chose comfort care on his behalf.     He is DNR and orders are written. Children are coming to see him through the door in ICU. We have counseled against going into room as they would need to quarantine at home for 14 days despite their being vaccinated.     Narcotics ordered for him, and will plan to withdraw aggressive measures once family has a chance to come to visit.    I spent 30 minutes of critical care time having end of life discussion with family this morning.     sumit aguayo  December 17, 2021

## 2021-12-17 NOTE — PROVIDER NOTIFICATION
MD Notification    Notified Person: MD    Notified Person Name: Haider    Notification Date/Time: 12/16 1630    Notification Interaction:  [facility] text and amcom page.     Purpose of Notification:Patient not tolerating HFNC, satting 80-89%, Put back on bipap and RT did increase settings. Also, do you want to change any meds to IV since not eating the few days?    Orders Received: Message read on [employer]. No response.     Comments:

## 2021-12-18 NOTE — PROGRESS NOTES
House Officer Death Pronouncement    Called by nursing staff to pronounce Stephen Valerio dead.    Physical Exam: Spontaneous respirations absent, Breath sounds absent, Carotid pulse absent, Heart sounds absent and Pupillary light reflex absent    Patient was pronounced dead at 1900: PM, 2021.    No data filed       Active Problems:    Hypoxia    Infection due to 2019 novel coronavirus       Infectious disease present?: YES    Communicable disease present? (examples: HIV, chicken pox, TB, Ebola, CJD) :  YES    Multi-drug resistant organism present? (example: MRSA): NO    Please consider an autopsy if any of the following exist:  NO Unexpected or unexplained death during or following any dental, medical, or surgical diagnostic treatment procedures.   NO Death of mother at or up to seven days after delivery.     NO All  and pediatric deaths.     NO Death where the cause is sufficiently obscure to delay completion of the death certificate.   NO Deaths in which autopsy would confirm a suspected illness/condition that would affect surviving family members or recipients of transplanted organs.     The following deaths must be reported to the 's Office:  NO A death that may be due entirely or in part to any factors other than natural disease (recent surgery, recent trauma, suspected abuse/neglect).   NO A death that may be an accident, suicide, or homicide.     NO Any sudden, unexpected death in which there is no prior history of significant heart disease or any other condition associated with sudden death.   NO A death under suspicious, unusual, or unexpected circumstances.    NO Any death which is apparently due to natural causes but in which the  does not have a personal physician familiar with the patient s medical history, social, or environmental situation or the circumstances of the terminal event.   NO Any death apparently due to Sudden Infant Death Syndrome.     NO Deaths  that occur during, in association with, or as consequences of a diagnostic, therapeutic, or anesthetic procedure.   NO Any death in which a fracture of a major bone has occurred within the past (6) six months.   NO A death of persons note seen by their physician within 120 days of demise.     NO Any death in which the  was an inmate of a public institution or was in the custody of Law Enforcement personnel.   NO  All unexpected deaths of children   NO Solid organ donors   NO Unidentified bodies   NO Deaths of persons whose bodies are to be cremated or otherwise disposed of so that the bodies will later be unavailable for examination;   NO Deaths unattended by a physician outside of a licensed healthcare facility or licensed residential hospice program   NO Deaths occurring within 24 hours of arrival to a health care facility if death is unexpected.    NO Deaths associated with the decedent s employment.   NO Deaths attributed to acts of terrorism.   NO Any death in which there is uncertainty as to whether it is a medical examiner s care should be discussed with the medical investigator.      Death Certificate to be directed to Dr. Polly Maloney  Attending physician, Dr. Polly Maloney , notified of death.    Body disposition: Autopsy was discussed with family member:  Son in person.  Permission for autopsy was declined.    I informed pt's family about pt's death at [facility] w/ help of .  They had many questions about how to get pt's body back to [place].  RN provided info re: local nursing homes who hopefully can assist family w/ that process.      Jaqueline Willett, [facility] Center[facility] - [place] [facility]  [phone]     Text Page

## 2021-12-18 NOTE — DISCHARGE SUMMARY
[facility]    Death Summary - Hospitalist Service     Date of Admission:  12/8/2021  Date of Death: 12/17/2021  7:00 PM  Discharging Provider: Polly Maloney MD    Discharge Diagnoses   Acute hypoxic, hypercarbic respiratory failure secondary to COVID-19 pneumonia  Possible superimposed bacterial pneumonia  Acute metabolic encephalopathy secondary to Covid infection, hypoxia and hypercarbia  Type 2 diabetes mellitus  Rheumatoid arthritis  Gout  Hyponatremia  Hypernatremia  Osteoarthritis of bilateral knees    Cause of death: Pneumonia due to COVID-19 infection    Hospital Course   Acute hypoxic respiratory failure, likely secondary to COVID-19 pneumonia:   Possible Bacterial Pneumonia   Acute hypoxic hypercapnic respiratory failure.   Acute Encephalopathy secondary to hypoxia and Hypercapnia.   This is a 81-year-old male,  He has been vaccinated in [place], does not remember what vaccination.  He is immunocompromised status given use of methotrexate for his rheumatoid arthritis.  He was recently tested negative COVID last month and was treated for pneumonia after his total knee arthroplasty.  He presented  with coughing, coughing up some phlegm, fatigue, tired, weakness, back pain.  Chest x-ray does show bibasilar airspace pulmonary opacity, left worse than right, worrisome for infection.    COVID-19 PCR was positive.    He was treated with IV dexamethasone.  Remdesivir was held due to bradycardia.  Inflammatory markers, including CRP, were elevated and continued to rise despite maximal medical therapy.  He was not a candidate for use of Tozilizumab or other immune modulating therapies because of his immune compromise.    He had been on Lovenox but then developed severe thrombocytopenia and was tested for heparin-induced thrombocytopenia.  HIT labs were pending at the time that he passed away.    Shon's oxygen needs increased despite maximal medical care.  He was treated with BiPAP, with  highest possible flow and pressure settings, but failed to improve.  He was transferred to the intensive care unit.  Dr. Heaton discussed prognosis with patient's family, including the fact that intubation would not help his body overcome Covid infection.  They elected emphasis on his comfort and multiple family members came to visit him.  He  at 7 PM on 2021.       Polly Maloney MD  [facility]  ______________________________________________________________________      Significant Results and Procedures   Most Recent 3 CBC's:Recent Labs   Lab Test 21  0624 21  0604 12/15/21  0551   WBC 0.9* 1.6* 3.0*   HGB 13.2* 12.2* 12.8*   * 97 99   * 57* 79*     Most Recent 3 BMP's:Recent Labs   Lab Test 21  0624 21  0513 21  2120 21  0821 21  0604 12/15/21  1001 12/15/21  0551   *  --   --   --  147*  --  146*   POTASSIUM 3.4  --   --   --  3.6  --  3.5   CHLORIDE 119*  --   --   --  117*  --  112*   CO2 24  --   --   --  27  --  26   BUN 51*  --   --   --  38*  --  42*   CR 1.08  --   --   --  0.79  --  0.82   ANIONGAP 9  --   --   --  3  --  8   BRADY 10.4*  --   --   --  9.5  --  9.8   * 96 190*   < > 281*   < > 167*    < > = values in this interval not displayed.   ,   Results for orders placed or performed during the hospital encounter of 21   XR Chest Port 1 View    Narrative    CHEST ONE VIEW PORTABLE  2021 2:34 PM     HISTORY: Shortness of breath.    COMPARISON: Chest x-ray on 2021      Impression    IMPRESSION: Single AP view of the chest was obtained.  Cardiomediastinal silhouette is within normal limits. Bibasilar  airspace pulmonary opacities, left worse than right, worrisome for  infection. No significant pleural effusion or pneumothorax.    DERRICK PRINGLE MD         SYSTEM ID:  [id number]   CT Head w/o Contrast    Narrative    EXAM: CT HEAD W/O CONTRAST  LOCATION: [facility]  DATE/TIME: 12/8/2021 5:38 PM    INDICATION: Confusion  COMPARISON: None.  TECHNIQUE: Routine CT Head without IV contrast. Multiplanar reformats. Dose reduction techniques were used.    FINDINGS:  INTRACRANIAL CONTENTS: No intracranial hemorrhage, extraaxial collection, or mass effect.  No CT evidence of acute infarct. Moderate presumed chronic small vessel ischemic changes. Moderate generalized volume loss. No hydrocephalus.     VISUALIZED ORBITS/SINUSES/MASTOIDS: No intraorbital abnormality. No paranasal sinus mucosal disease. No middle ear or mastoid effusion.    BONES/SOFT TISSUES: No acute abnormality.      Impression    IMPRESSION:  1.  No acute intracranial process.   XR Chest Port 1 View    Narrative    XR CHEST PORT 1 VIEW 12/10/2021 10:10 AM    HISTORY: SOB    COMPARISON: 12/8/2021        Impression    IMPRESSION: Left perihilar and basilar infiltrate has slightly  improved. No pleural effusion or pneumothorax. Normal heart size.    CYNDIE ROBINS MD         SYSTEM ID:  [id number]   CT Chest Pulmonary Embolism w Contrast    Narrative    EXAM: CT CHEST PULMONARY EMBOLISM W CONTRAST  LOCATION: [facility]  DATE/TIME: 12/10/2021 4:42 PM    INDICATION: Chest pain. PE suspected, high prob.  COMPARISON: None.  TECHNIQUE: CT chest pulmonary angiogram during arterial phase injection of IV contrast. Multiplanar reformats and MIP reconstructions were performed. Dose reduction techniques were used.   CONTRAST: 64 mL Isovue-370    FINDINGS:  ANGIOGRAM CHEST: Pulmonary arteries are normal caliber and negative for pulmonary emboli. Thoracic aorta is negative for dissection. No CT evidence of right heart strain.    LUNGS AND PLEURA: Moderate to severe infiltrates, including ground-glass and consolidative changes.    MEDIASTINUM/AXILLAE: A few minimally prominent lymph nodes are noted, likely reactive in the setting. Moderately atherosclerotic nonaneurysmal  aorta.    CORONARY ARTERY CALCIFICATION: None.    UPPER ABDOMEN: Cholelithiasis without cholecystitis.    MUSCULOSKELETAL: No frankly destructive bony lesions.      Impression    IMPRESSION:  1.  No pulmonary embolism demonstrated.  2.  Moderate to severe bilateral infiltrates.   XR Chest Port 1 View    Narrative    EXAM: XR CHEST PORT 1 VIEW  LOCATION: [facility]  DATE/TIME: 12/17/2021 1:36 AM    INDICATION: COVID +, worsening resp status  COMPARISON: 12/10/2021      Impression    IMPRESSION: Increasing airspace infiltrates especially involving left lung consistent with worsening pneumonia. Heart size remains normal.       Consultations This Hospital Stay   GASTROENTEROLOGY IP CONSULT  CARE MANAGEMENT / SOCIAL WORK IP CONSULT  PHARMACY IP CONSULT  INTENSIVIST IP CONSULT    Primary Care Physician   ORACIO GRANT    Time Spent on this Encounter   I, Polly Maloney MD, personally saw the patient today and spent greater than 30 minutes discharging this patient.

## 2021-12-18 NOTE — PLAN OF CARE
[facility] Intensive Care Unit  Nursing Note        Post-mortem cares were completed as well as the Death Record.  Family remains unsure of  home to use as they wish the decedent to be transported back to [place].  Pts head was wrapped, per family wishes, to keep his mouth closed and his Rosaries and picture were placed with the body.    Family, who were in attendance all day, were guided through routine decontamination to mitigate fomite transmission and were advised to get Covid tested and quarantine per CDC guidance.    Security was notified to do the removal at 1930.          Reggie Rene MSN, RN, PHN, CCRN  [facility]   Intensive Care Unit

## 2021-12-23 ENCOUNTER — MEDICAL CORRESPONDENCE (OUTPATIENT)
Dept: HEALTH INFORMATION MANAGEMENT | Facility: CLINIC | Age: 81
End: 2021-12-23
Payer: MEDICARE

## 2025-05-14 NOTE — TELEPHONE ENCOUNTER
Called and spoke to patient's son - family is asking if Craig Dill can provide information on patient's current health. They are also requesting to see patient as they stated patient has not been able to cooperate with doctors (taking meds, eat meals) - they are aware that due to COVID they can't see him - . Family is concern about patient having Depression sx.    Please advise.     Patient's son (Nathaniel) also sent a [employer] message to PCP in regards of the same concerns.    Louise BORREGO MA on 12/16/2021 at 11:22 AM       low back pain , no spinous tenderness, torticolis. limited rom to neck

## (undated) DEVICE — BONE CEMENT MIXEVAC III HI VAC KIT  0206-015-000

## (undated) DEVICE — DRSG AQUACEL AG HYDROFIBER  3.5X10" 422605

## (undated) DEVICE — BONE CLEANING TIP INTERPULSE  0210-010-000

## (undated) DEVICE — GLOVE PROTEXIS W/NEU-THERA 7.0  2D73TE70

## (undated) DEVICE — STRAP KNEE/BODY 31143004

## (undated) DEVICE — PREP DURAPREP 26ML APL 8630

## (undated) DEVICE — LINEN BACK PACK 5440

## (undated) DEVICE — LINEN TOWEL PACK X5 5464

## (undated) DEVICE — SU VICRYL 0 CT-1 36" J946H

## (undated) DEVICE — SOL NACL 0.9% IRRIG 3000ML BAG 2B7477

## (undated) DEVICE — SU VICRYL 1 CT-1 36" J347H

## (undated) DEVICE — ESU PENCIL W/SMOKE EVAC NEPTUNE STRYKER 0703-046-000

## (undated) DEVICE — SU VICRYL 2-0 CT-1 27" UND J259H

## (undated) DEVICE — DRSG TEGADERM 4X4 3/4" 1626W

## (undated) DEVICE — BASIN SET MAJOR

## (undated) DEVICE — SYR BULB IRRIG 50ML LATEX FREE 0035280

## (undated) DEVICE — SOL WATER IRRIG 1000ML BOTTLE 2F7114

## (undated) DEVICE — SU MONOCRYL 4-0 PS-2 18" UND Y496G

## (undated) DEVICE — BLADE SAW SAGITTAL STRK 18X90X1.27MM HD SYS 6 6118-127-090

## (undated) DEVICE — DRSG DRAIN 4X4" 7086

## (undated) DEVICE — GOWN XLG DISP 9545

## (undated) DEVICE — SOL NACL 0.9% IRRIG 1000ML BOTTLE 2F7124

## (undated) DEVICE — CAST PADDING 6" STERILE 9046S

## (undated) DEVICE — DRAIN ROUND W/RESERV KIT JACKSON PRATT 10FR 400ML SU130-402D

## (undated) DEVICE — SUCTION MANIFOLD NEPTUNE 2 SYS 4 PORT 0702-020-000

## (undated) DEVICE — DRSG STERI STRIP 1/2X4" R1547

## (undated) DEVICE — Device

## (undated) DEVICE — PREP DURAPREP REMOVER 4OZ 8611

## (undated) DEVICE — PREP POVIDONE-IODINE 7.5% SCRUB 4OZ BOTTLE MDS093945

## (undated) DEVICE — LINEN GOWN X4 5410

## (undated) DEVICE — GLOVE PROTEXIS BLUE W/NEU-THERA 7.5  2D73EB75

## (undated) DEVICE — HOOD FLYTE W/PEELAWAY 408-800-100

## (undated) DEVICE — BLADE KNIFE SURG 15 371115

## (undated) DEVICE — DRAPE U-DRAPE 1015NSD NON-STERILE

## (undated) DEVICE — SUCTION IRR SYSTEM W/O TIP INTERPULSE HANDPIECE 0210-100-000

## (undated) DEVICE — ESU GROUND PAD UNIVERSAL W/O CORD

## (undated) RX ORDER — DEXAMETHASONE SODIUM PHOSPHATE 4 MG/ML
INJECTION, SOLUTION INTRA-ARTICULAR; INTRALESIONAL; INTRAMUSCULAR; INTRAVENOUS; SOFT TISSUE
Status: DISPENSED
Start: 2021-01-01

## (undated) RX ORDER — TRANEXAMIC ACID 650 MG/1
TABLET ORAL
Status: DISPENSED
Start: 2021-01-01

## (undated) RX ORDER — FENTANYL CITRATE 50 UG/ML
INJECTION, SOLUTION INTRAMUSCULAR; INTRAVENOUS
Status: DISPENSED
Start: 2021-01-01

## (undated) RX ORDER — ONDANSETRON 2 MG/ML
INJECTION INTRAMUSCULAR; INTRAVENOUS
Status: DISPENSED
Start: 2021-01-01

## (undated) RX ORDER — CEFAZOLIN SODIUM 2 G/100ML
INJECTION, SOLUTION INTRAVENOUS
Status: DISPENSED
Start: 2021-01-01